# Patient Record
Sex: MALE | Race: WHITE | Employment: FULL TIME | URBAN - METROPOLITAN AREA
[De-identification: names, ages, dates, MRNs, and addresses within clinical notes are randomized per-mention and may not be internally consistent; named-entity substitution may affect disease eponyms.]

---

## 2017-05-09 ENCOUNTER — HOSPITAL ENCOUNTER (OUTPATIENT)
Age: 62
Setting detail: SPECIMEN
Discharge: HOME | End: 2017-05-09
Payer: COMMERCIAL

## 2017-05-09 ENCOUNTER — HOSPITAL ENCOUNTER (OUTPATIENT)
Age: 62
Discharge: HOME | End: 2017-05-09
Payer: COMMERCIAL

## 2017-05-09 ENCOUNTER — HOSPITAL ENCOUNTER (OUTPATIENT)
Dept: GENERAL RADIOLOGY | Age: 62
Discharge: HOME | End: 2017-05-09
Payer: COMMERCIAL

## 2017-05-09 DIAGNOSIS — K59.00 CONSTIPATION, UNSPECIFIED CONSTIPATION TYPE: ICD-10-CM

## 2017-05-09 DIAGNOSIS — R19.4 CHANGE IN BOWEL HABITS: ICD-10-CM

## 2017-05-09 DIAGNOSIS — I10 ESSENTIAL HYPERTENSION: ICD-10-CM

## 2017-05-09 DIAGNOSIS — R10.9 BILATERAL FLANK PAIN: ICD-10-CM

## 2017-05-09 DIAGNOSIS — Z12.5 SCREENING PSA (PROSTATE SPECIFIC ANTIGEN): ICD-10-CM

## 2017-05-09 LAB
ABSOLUTE EOS #: 0.1 K/UL (ref 0–0.4)
ABSOLUTE LYMPH #: 1.5 K/UL (ref 1–4.8)
ABSOLUTE MONO #: 0.6 K/UL (ref 0.1–1.2)
ALBUMIN SERPL-MCNC: 4.6 G/DL (ref 3.5–5.2)
ALBUMIN/GLOBULIN RATIO: 1.7 (ref 1–2.5)
ALP BLD-CCNC: 70 U/L (ref 40–129)
ALT SERPL-CCNC: 20 U/L (ref 5–41)
ANION GAP SERPL CALCULATED.3IONS-SCNC: 17 MMOL/L (ref 9–17)
AST SERPL-CCNC: 22 U/L
BASOPHILS # BLD: 1 %
BASOPHILS ABSOLUTE: 0 K/UL (ref 0–0.2)
BILIRUB SERPL-MCNC: 0.21 MG/DL (ref 0.3–1.2)
BUN BLDV-MCNC: 21 MG/DL (ref 6–20)
BUN/CREAT BLD: ABNORMAL (ref 9–20)
CALCIUM SERPL-MCNC: 9.5 MG/DL (ref 8.6–10.4)
CHLORIDE BLD-SCNC: 103 MMOL/L (ref 98–107)
CHOLESTEROL/HDL RATIO: 4.6
CHOLESTEROL: 180 MG/DL
CO2: 22 MMOL/L (ref 20–31)
CREAT SERPL-MCNC: 0.9 MG/DL (ref 0.7–1.2)
DIFFERENTIAL TYPE: ABNORMAL
EOSINOPHILS RELATIVE PERCENT: 3 %
GFR AFRICAN AMERICAN: >60 ML/MIN
GFR NON-AFRICAN AMERICAN: >60 ML/MIN
GFR SERPL CREATININE-BSD FRML MDRD: ABNORMAL ML/MIN/{1.73_M2}
GFR SERPL CREATININE-BSD FRML MDRD: ABNORMAL ML/MIN/{1.73_M2}
GLUCOSE BLD-MCNC: 104 MG/DL (ref 70–99)
HCT VFR BLD CALC: 42.7 % (ref 41–53)
HDLC SERPL-MCNC: 39 MG/DL
HEMOGLOBIN: 14.2 G/DL (ref 13.5–17.5)
LDL CHOLESTEROL: 67 MG/DL (ref 0–130)
LYMPHOCYTES # BLD: 28 %
MCH RBC QN AUTO: 31.8 PG (ref 26–34)
MCHC RBC AUTO-ENTMCNC: 33.3 G/DL (ref 31–37)
MCV RBC AUTO: 95.5 FL (ref 80–100)
MONOCYTES # BLD: 12 %
PDW BLD-RTO: 14.7 % (ref 12.5–15.4)
PLATELET # BLD: 195 K/UL (ref 140–450)
PLATELET ESTIMATE: ABNORMAL
PMV BLD AUTO: 7.8 FL (ref 6–12)
POTASSIUM SERPL-SCNC: 4.8 MMOL/L (ref 3.7–5.3)
RBC # BLD: 4.47 M/UL (ref 4.5–5.9)
RBC # BLD: ABNORMAL 10*6/UL
SEG NEUTROPHILS: 56 %
SEGMENTED NEUTROPHILS ABSOLUTE COUNT: 2.9 K/UL (ref 1.8–7.7)
SODIUM BLD-SCNC: 142 MMOL/L (ref 135–144)
TOTAL PROTEIN: 7.3 G/DL (ref 6.4–8.3)
TRIGL SERPL-MCNC: 371 MG/DL
VLDLC SERPL CALC-MCNC: ABNORMAL MG/DL (ref 1–30)
WBC # BLD: 5.2 K/UL (ref 3.5–11)
WBC # BLD: ABNORMAL 10*3/UL

## 2017-05-09 PROCEDURE — 74020 XR ABDOMEN STANDARD: CPT

## 2017-05-10 LAB — PROSTATE SPECIFIC ANTIGEN: 0.27 UG/L

## 2017-07-24 PROBLEM — R42 DIZZY: Status: ACTIVE | Noted: 2017-07-24

## 2017-07-27 ENCOUNTER — HOSPITAL ENCOUNTER (OUTPATIENT)
Dept: CT IMAGING | Age: 62
Discharge: HOME | End: 2017-07-27
Payer: COMMERCIAL

## 2017-07-27 DIAGNOSIS — R42 DIZZY: ICD-10-CM

## 2017-07-27 PROCEDURE — 70450 CT HEAD/BRAIN W/O DYE: CPT

## 2017-08-31 ENCOUNTER — HOSPITAL ENCOUNTER (OUTPATIENT)
Age: 62
Discharge: HOME | End: 2017-08-31
Payer: COMMERCIAL

## 2017-08-31 LAB
BUN BLDV-MCNC: 16 MG/DL (ref 8–23)
CHOLESTEROL/HDL RATIO: 5
CHOLESTEROL: 145 MG/DL
CREAT SERPL-MCNC: 0.92 MG/DL (ref 0.7–1.2)
GFR AFRICAN AMERICAN: >60 ML/MIN
GFR NON-AFRICAN AMERICAN: >60 ML/MIN
GFR SERPL CREATININE-BSD FRML MDRD: NORMAL ML/MIN/{1.73_M2}
GFR SERPL CREATININE-BSD FRML MDRD: NORMAL ML/MIN/{1.73_M2}
HDLC SERPL-MCNC: 29 MG/DL
LDL CHOLESTEROL DIRECT: 62 MG/DL
LDL CHOLESTEROL: ABNORMAL MG/DL (ref 0–130)
SEDIMENTATION RATE, ERYTHROCYTE: 20 MM (ref 0–10)
TRIGL SERPL-MCNC: 410 MG/DL
VLDLC SERPL CALC-MCNC: ABNORMAL MG/DL (ref 1–30)

## 2017-08-31 PROCEDURE — 80061 LIPID PANEL: CPT

## 2017-08-31 PROCEDURE — 82565 ASSAY OF CREATININE: CPT

## 2017-08-31 PROCEDURE — 84520 ASSAY OF UREA NITROGEN: CPT

## 2017-08-31 PROCEDURE — 83721 ASSAY OF BLOOD LIPOPROTEIN: CPT

## 2017-08-31 PROCEDURE — 36415 COLL VENOUS BLD VENIPUNCTURE: CPT

## 2017-08-31 PROCEDURE — 85651 RBC SED RATE NONAUTOMATED: CPT

## 2017-09-05 ENCOUNTER — HOSPITAL ENCOUNTER (OUTPATIENT)
Dept: NON INVASIVE DIAGNOSTICS | Age: 62
Discharge: HOME | End: 2017-09-05
Payer: COMMERCIAL

## 2017-09-05 ENCOUNTER — HOSPITAL ENCOUNTER (OUTPATIENT)
Dept: MRI IMAGING | Age: 62
Discharge: HOME | End: 2017-09-05
Payer: COMMERCIAL

## 2017-09-05 ENCOUNTER — HOSPITAL ENCOUNTER (OUTPATIENT)
Dept: VASCULAR LAB | Age: 62
Discharge: HOME | End: 2017-09-05
Payer: COMMERCIAL

## 2017-09-05 DIAGNOSIS — I63.311 CEREBRAL INFARCTION DUE TO THROMBOSIS OF RIGHT MIDDLE CEREBRAL ARTERY (HCC): ICD-10-CM

## 2017-09-05 LAB
LV EF: 55 %
LVEF MODALITY: NORMAL

## 2017-09-05 PROCEDURE — 70553 MRI BRAIN STEM W/O & W/DYE: CPT

## 2017-09-05 PROCEDURE — 2580000003 HC RX 258: Performed by: PSYCHIATRY & NEUROLOGY

## 2017-09-05 PROCEDURE — 6360000004 HC RX CONTRAST MEDICATION: Performed by: PSYCHIATRY & NEUROLOGY

## 2017-09-05 PROCEDURE — A9579 GAD-BASE MR CONTRAST NOS,1ML: HCPCS | Performed by: PSYCHIATRY & NEUROLOGY

## 2017-09-05 PROCEDURE — 93306 TTE W/DOPPLER COMPLETE: CPT

## 2017-09-05 PROCEDURE — 93880 EXTRACRANIAL BILAT STUDY: CPT

## 2017-09-05 RX ORDER — SODIUM CHLORIDE 0.9 % (FLUSH) 0.9 %
10 SYRINGE (ML) INJECTION PRN
Status: DISCONTINUED | OUTPATIENT
Start: 2017-09-05 | End: 2017-09-08 | Stop reason: HOSPADM

## 2017-09-05 RX ADMIN — Medication 10 ML: at 11:06

## 2017-09-05 RX ADMIN — GADOPENTETATE DIMEGLUMINE 18 ML: 469.01 INJECTION INTRAVENOUS at 11:05

## 2018-01-11 ENCOUNTER — HOSPITAL ENCOUNTER (OUTPATIENT)
Age: 63
Setting detail: SPECIMEN
Discharge: HOME | End: 2018-01-11
Payer: COMMERCIAL

## 2018-01-12 LAB
THYROXINE, FREE: 1.07 NG/DL (ref 0.93–1.7)
TSH SERPL DL<=0.05 MIU/L-ACNC: 2.01 MIU/L (ref 0.3–5)

## 2018-01-16 LAB
ACETYLCHOL BLOCK AB: 0 % (ref 0–26)
ACETYLCHOLINE BINDING ANTIBODY: 0 NMOL/L (ref 0–0.4)
STRIATED MUSCLE AB, IGG SCREEN: NORMAL
TITIN ANTIBODY: 0.24 IV (ref 0–0.45)

## 2018-05-16 ENCOUNTER — HOSPITAL ENCOUNTER (OUTPATIENT)
Age: 63
Discharge: HOME | End: 2018-05-18
Payer: COMMERCIAL

## 2018-05-16 ENCOUNTER — HOSPITAL ENCOUNTER (OUTPATIENT)
Dept: GENERAL RADIOLOGY | Age: 63
Discharge: HOME | End: 2018-05-18
Payer: COMMERCIAL

## 2018-05-16 DIAGNOSIS — M10.9 GOUT OF BIG TOE: ICD-10-CM

## 2018-05-16 DIAGNOSIS — R05.8 COUGH WITH SPUTUM: ICD-10-CM

## 2018-05-16 PROCEDURE — 73630 X-RAY EXAM OF FOOT: CPT

## 2018-05-16 PROCEDURE — 71046 X-RAY EXAM CHEST 2 VIEWS: CPT

## 2018-08-29 ENCOUNTER — OFFICE VISIT (OUTPATIENT)
Dept: ORTHOPEDIC SURGERY | Age: 63
End: 2018-08-29
Payer: COMMERCIAL

## 2018-08-29 DIAGNOSIS — M25.512 CHRONIC LEFT SHOULDER PAIN: Primary | ICD-10-CM

## 2018-08-29 DIAGNOSIS — G89.29 CHRONIC LEFT SHOULDER PAIN: Primary | ICD-10-CM

## 2018-08-29 PROCEDURE — 99203 OFFICE O/P NEW LOW 30 MIN: CPT | Performed by: ORTHOPAEDIC SURGERY

## 2018-08-29 NOTE — PROGRESS NOTES
Larna Mcardle M.D.            [address] [place] [address]., [address] [facility], [address] [facility]             Dept Phone: [phone]             Dept Fax:  [phone]    Jackie returns today. He's here for his left shoulder. He has a history of bilateral shoulder arthroscopies and mini open repairs. These were done in 2014 in 2015. He's had no problems with the left arm at all. He did have a recent episode he was doing some heavy lifting he felt a snap in his left shoulder is quite painful but is gotten better    Examination left shoulder notes obvious Evelio lesion. He has full abduction and external rotation strength however. No other contributory findings. He has no pain in the elbow no supination weakness    X-rays taken today reviewed by me show AP and outlet views left shoulder. He does have slight elevation humeral head but no other acute findings    Impression  Status post rotator cuff repair left shoulder  New-onset proximal biceps tendon rupture left shoulder  Previous rotator cuff repair right shoulder    Plan  Patient was advised that he has a biceps tendon rupture with a Evelio lesion. Nothing to do for this that she does self resolving over time and he's happy to hear this. Encouraged patient continue exercises. Back here when necessary        Review of Systems   Constitutional: Negative. HENT: Negative. Respiratory: Negative. Cardiovascular: Negative. Musculoskeletal: Negative. Neurological: Negative. Past Medical History:   Diagnosis Date    Arthritis     Chronic right shoulder pain     Chronic shoulder pain, left 2001    had surgery to repair    History of general anesthesia complication 6/66/8662    airway closed off after intubation after cervical neck surgery, was remedied with a counteracting medication    HLD (hyperlipidemia)     Hypertension     DARYL on CPAP 4/22/2015    Dr. Lyric Talbert.  Auto, 12-16cm of

## 2018-09-04 ENCOUNTER — HOSPITAL ENCOUNTER (OUTPATIENT)
Age: 63
Setting detail: SPECIMEN
Discharge: HOME | End: 2018-09-04
Payer: COMMERCIAL

## 2018-09-04 DIAGNOSIS — I10 ESSENTIAL HYPERTENSION: ICD-10-CM

## 2018-09-04 DIAGNOSIS — Z12.5 SCREENING PSA (PROSTATE SPECIFIC ANTIGEN): ICD-10-CM

## 2018-09-04 DIAGNOSIS — E78.2 MIXED HYPERLIPIDEMIA: ICD-10-CM

## 2018-09-04 DIAGNOSIS — Z72.89 OTHER PROBLEMS RELATED TO LIFESTYLE: ICD-10-CM

## 2018-09-04 DIAGNOSIS — Z87.39 H/O: GOUT: ICD-10-CM

## 2018-09-04 DIAGNOSIS — Z13.1 ENCOUNTER FOR SCREENING FOR DIABETES MELLITUS: ICD-10-CM

## 2018-09-04 PROBLEM — R42 DIZZY: Status: RESOLVED | Noted: 2017-07-24 | Resolved: 2018-09-04

## 2018-09-04 LAB
ABSOLUTE EOS #: 0.16 K/UL (ref 0–0.44)
ABSOLUTE IMMATURE GRANULOCYTE: 0.05 K/UL (ref 0–0.3)
ABSOLUTE LYMPH #: 1.68 K/UL (ref 1.1–3.7)
ABSOLUTE MONO #: 0.67 K/UL (ref 0.1–1.2)
ALBUMIN SERPL-MCNC: 4.6 G/DL (ref 3.5–5.2)
ALBUMIN/GLOBULIN RATIO: 1.8 (ref 1–2.5)
ALP BLD-CCNC: 68 U/L (ref 40–129)
ALT SERPL-CCNC: 26 U/L (ref 5–41)
ANION GAP SERPL CALCULATED.3IONS-SCNC: 14 MMOL/L (ref 9–17)
AST SERPL-CCNC: 24 U/L
BASOPHILS # BLD: 1 % (ref 0–2)
BASOPHILS ABSOLUTE: 0.04 K/UL (ref 0–0.2)
BILIRUB SERPL-MCNC: 0.58 MG/DL (ref 0.3–1.2)
BUN BLDV-MCNC: 18 MG/DL (ref 8–23)
BUN/CREAT BLD: ABNORMAL (ref 9–20)
CALCIUM SERPL-MCNC: 9.5 MG/DL (ref 8.6–10.4)
CHLORIDE BLD-SCNC: 103 MMOL/L (ref 98–107)
CHOLESTEROL/HDL RATIO: 3.3
CHOLESTEROL: 111 MG/DL
CO2: 27 MMOL/L (ref 20–31)
CREAT SERPL-MCNC: 0.87 MG/DL (ref 0.7–1.2)
DIFFERENTIAL TYPE: ABNORMAL
EOSINOPHILS RELATIVE PERCENT: 3 % (ref 1–4)
GFR AFRICAN AMERICAN: >60 ML/MIN
GFR NON-AFRICAN AMERICAN: >60 ML/MIN
GFR SERPL CREATININE-BSD FRML MDRD: ABNORMAL ML/MIN/{1.73_M2}
GFR SERPL CREATININE-BSD FRML MDRD: ABNORMAL ML/MIN/{1.73_M2}
GLUCOSE BLD-MCNC: 103 MG/DL (ref 70–99)
HCT VFR BLD CALC: 42.9 % (ref 40.7–50.3)
HDLC SERPL-MCNC: 34 MG/DL
HEMOGLOBIN: 13.9 G/DL (ref 13–17)
HEPATITIS C ANTIBODY: NONREACTIVE
IMMATURE GRANULOCYTES: 1 %
LDL CHOLESTEROL: 27 MG/DL (ref 0–130)
LYMPHOCYTES # BLD: 28 % (ref 24–43)
MCH RBC QN AUTO: 31.4 PG (ref 25.2–33.5)
MCHC RBC AUTO-ENTMCNC: 32.4 G/DL (ref 28.4–34.8)
MCV RBC AUTO: 96.8 FL (ref 82.6–102.9)
MONOCYTES # BLD: 11 % (ref 3–12)
NRBC AUTOMATED: 0 PER 100 WBC
PDW BLD-RTO: 12.6 % (ref 11.8–14.4)
PLATELET # BLD: 188 K/UL (ref 138–453)
PLATELET ESTIMATE: ABNORMAL
PMV BLD AUTO: 9.6 FL (ref 8.1–13.5)
POTASSIUM SERPL-SCNC: 4.3 MMOL/L (ref 3.7–5.3)
PROSTATE SPECIFIC ANTIGEN: 0.3 UG/L
RBC # BLD: 4.43 M/UL (ref 4.21–5.77)
RBC # BLD: ABNORMAL 10*6/UL
SEG NEUTROPHILS: 56 % (ref 36–65)
SEGMENTED NEUTROPHILS ABSOLUTE COUNT: 3.52 K/UL (ref 1.5–8.1)
SODIUM BLD-SCNC: 144 MMOL/L (ref 135–144)
TOTAL PROTEIN: 7.1 G/DL (ref 6.4–8.3)
TRIGL SERPL-MCNC: 248 MG/DL
URIC ACID: 6.9 MG/DL (ref 3.4–7)
VLDLC SERPL CALC-MCNC: ABNORMAL MG/DL (ref 1–30)
WBC # BLD: 6.1 K/UL (ref 3.5–11.3)
WBC # BLD: ABNORMAL 10*3/UL

## 2018-09-11 NOTE — PROGRESS NOTES
Recent lab testing was reviewed. NOrmal psa and hepatitis screening. High triglycerides of 248, improved from 410. OVerall cholesterol thou is good. Continue lipitor 40mg.  Normal uric acid level

## 2018-10-16 DIAGNOSIS — Z12.11 ENCOUNTER FOR SCREENING COLONOSCOPY: Primary | ICD-10-CM

## 2018-10-17 RX ORDER — POLYETHYLENE GLYCOL 3350 17 G/17G
POWDER, FOR SOLUTION ORAL
Qty: 255 G | Refills: 0 | Status: SHIPPED | OUTPATIENT
Start: 2018-10-17 | End: 2018-11-15

## 2018-10-21 ENCOUNTER — HOSPITAL ENCOUNTER (EMERGENCY)
Age: 63
Discharge: HOME | End: 2018-10-21
Attending: EMERGENCY MEDICINE
Payer: COMMERCIAL

## 2018-10-21 VITALS
WEIGHT: 195 LBS | HEART RATE: 88 BPM | BODY MASS INDEX: 29.55 KG/M2 | HEIGHT: 68 IN | TEMPERATURE: 98.8 F | SYSTOLIC BLOOD PRESSURE: 148 MMHG | RESPIRATION RATE: 18 BRPM | OXYGEN SATURATION: 99 % | DIASTOLIC BLOOD PRESSURE: 78 MMHG

## 2018-10-21 DIAGNOSIS — M62.838 TRAPEZIUS MUSCLE SPASM: Primary | ICD-10-CM

## 2018-10-21 PROCEDURE — 96372 THER/PROPH/DIAG INJ SC/IM: CPT

## 2018-10-21 PROCEDURE — 6360000002 HC RX W HCPCS: Performed by: PHYSICIAN ASSISTANT

## 2018-10-21 PROCEDURE — 99283 EMERGENCY DEPT VISIT LOW MDM: CPT

## 2018-10-21 RX ORDER — TRAMADOL HYDROCHLORIDE 50 MG/1
50 TABLET ORAL EVERY 8 HOURS PRN
Qty: 9 TABLET | Refills: 0 | Status: SHIPPED | OUTPATIENT
Start: 2018-10-21 | End: 2018-10-24

## 2018-10-21 RX ORDER — CYCLOBENZAPRINE HCL 10 MG
10 TABLET ORAL 2 TIMES DAILY PRN
Qty: 10 TABLET | Refills: 0 | Status: SHIPPED | OUTPATIENT
Start: 2018-10-21 | End: 2018-10-26

## 2018-10-21 RX ORDER — DEXAMETHASONE SODIUM PHOSPHATE 4 MG/ML
10 INJECTION, SOLUTION INTRA-ARTICULAR; INTRALESIONAL; INTRAMUSCULAR; INTRAVENOUS; SOFT TISSUE ONCE
Status: COMPLETED | OUTPATIENT
Start: 2018-10-21 | End: 2018-10-21

## 2018-10-21 RX ORDER — PREDNISONE 20 MG/1
20 TABLET ORAL DAILY
Qty: 5 TABLET | Refills: 0 | Status: SHIPPED | OUTPATIENT
Start: 2018-10-21 | End: 2018-10-26

## 2018-10-21 RX ORDER — KETOROLAC TROMETHAMINE 30 MG/ML
60 INJECTION, SOLUTION INTRAMUSCULAR; INTRAVENOUS ONCE
Status: COMPLETED | OUTPATIENT
Start: 2018-10-21 | End: 2018-10-21

## 2018-10-21 RX ADMIN — KETOROLAC TROMETHAMINE 60 MG: 30 INJECTION, SOLUTION INTRAMUSCULAR at 11:05

## 2018-10-21 RX ADMIN — DEXAMETHASONE SODIUM PHOSPHATE 10 MG: 4 INJECTION, SOLUTION INTRAMUSCULAR; INTRAVENOUS at 11:05

## 2018-10-21 ASSESSMENT — PAIN DESCRIPTION - ONSET: ONSET: PROGRESSIVE

## 2018-10-21 ASSESSMENT — PAIN DESCRIPTION - ORIENTATION: ORIENTATION: LEFT

## 2018-10-21 ASSESSMENT — PAIN DESCRIPTION - LOCATION: LOCATION: SHOULDER

## 2018-10-21 ASSESSMENT — PAIN DESCRIPTION - PAIN TYPE: TYPE: ACUTE PAIN

## 2018-10-21 ASSESSMENT — PAIN SCALES - GENERAL: PAINLEVEL_OUTOF10: 6

## 2018-10-21 ASSESSMENT — PAIN DESCRIPTION - DESCRIPTORS: DESCRIPTORS: SHOOTING;STABBING;SHARP

## 2018-10-21 NOTE — ED PROVIDER NOTES
[address]  eMERGENCY dEPARTMENT eNCOUnter      Pt Name: Yin Chung  MRN: [id number]  [place] 1955  Date of evaluation: 10/21/18      CHIEF COMPLAINT       Chief Complaint   Patient presents with    Shoulder Pain     Left    Arm Pain     Left         HISTORY OF PRESENT ILLNESS    Yin Chung is a 66 y.o. male who presents complaining of neck pain, left shoulder pain   The history is provided by the patient. Shoulder Problem   Location:  Shoulder  Shoulder location:  L shoulder  Injury: no    Pain details:     Quality:  Aching    Radiates to: neck pain to left shoulder radiates to occipital area, worse when turning head to left. Severity:  Moderate    Onset quality:  Gradual    Duration:  2 days    Timing:  Constant    Progression:  Unchanged  Dislocation: no    Foreign body present:  No foreign bodies  Tetanus status:  Unknown  Prior injury to area: neck issues, previous surgeries,   Relieved by: position. Worsened by: Movement (worse when turning head to the left)  Ineffective treatments:  Immobilization  Associated symptoms: decreased range of motion, neck pain and stiffness    Associated symptoms: no fever and no numbness    Risk factors: known bone disorder        REVIEW OF SYSTEMS       Review of Systems   Constitutional: Negative for fever. Musculoskeletal: Positive for neck pain and stiffness. All other systems reviewed and are negative. PAST MEDICAL HISTORY     Past Medical History:   Diagnosis Date    Arthritis     Chronic right shoulder pain     Chronic shoulder pain, left 2001    had surgery to repair    History of general anesthesia complication 9/96/0597    airway closed off after intubation after cervical neck surgery, was remedied with a counteracting medication    HLD (hyperlipidemia)     Hypertension     DARYL on CPAP 4/22/2015    Dr. Tiff Stark.  Auto, 12-16cm of H20    Respiratory failure, post-operative ([address] 5/16/2001    due to airway swelling obstruction

## 2018-10-23 ENCOUNTER — TELEPHONE (OUTPATIENT)
Dept: GASTROENTEROLOGY | Age: 63
End: 2018-10-23

## 2018-10-24 ENCOUNTER — HOSPITAL ENCOUNTER (OUTPATIENT)
Age: 63
Setting detail: OUTPATIENT SURGERY
Discharge: HOME | End: 2018-10-24
Attending: INTERNAL MEDICINE | Admitting: INTERNAL MEDICINE
Payer: COMMERCIAL

## 2018-10-24 VITALS
WEIGHT: 195 LBS | SYSTOLIC BLOOD PRESSURE: 128 MMHG | TEMPERATURE: 97.5 F | BODY MASS INDEX: 29.55 KG/M2 | HEART RATE: 68 BPM | HEIGHT: 68 IN | RESPIRATION RATE: 16 BRPM | OXYGEN SATURATION: 97 % | DIASTOLIC BLOOD PRESSURE: 79 MMHG

## 2018-10-24 PROCEDURE — 88305 TISSUE EXAM BY PATHOLOGIST: CPT

## 2018-10-24 PROCEDURE — 6360000002 HC RX W HCPCS: Performed by: INTERNAL MEDICINE

## 2018-10-24 PROCEDURE — 99152 MOD SED SAME PHYS/QHP 5/>YRS: CPT | Performed by: INTERNAL MEDICINE

## 2018-10-24 PROCEDURE — 2580000003 HC RX 258: Performed by: INTERNAL MEDICINE

## 2018-10-24 PROCEDURE — 7100000011 HC PHASE II RECOVERY - ADDTL 15 MIN: Performed by: INTERNAL MEDICINE

## 2018-10-24 PROCEDURE — 99153 MOD SED SAME PHYS/QHP EA: CPT | Performed by: INTERNAL MEDICINE

## 2018-10-24 PROCEDURE — 2709999900 HC NON-CHARGEABLE SUPPLY: Performed by: INTERNAL MEDICINE

## 2018-10-24 PROCEDURE — 3609010300 HC COLONOSCOPY W/BIOPSY SINGLE/MULTIPLE: Performed by: INTERNAL MEDICINE

## 2018-10-24 PROCEDURE — 7100000010 HC PHASE II RECOVERY - FIRST 15 MIN: Performed by: INTERNAL MEDICINE

## 2018-10-24 RX ORDER — MIDAZOLAM HYDROCHLORIDE 1 MG/ML
INJECTION INTRAMUSCULAR; INTRAVENOUS PRN
Status: DISCONTINUED | OUTPATIENT
Start: 2018-10-24 | End: 2018-10-24 | Stop reason: HOSPADM

## 2018-10-24 RX ORDER — FENTANYL CITRATE 50 UG/ML
INJECTION, SOLUTION INTRAMUSCULAR; INTRAVENOUS PRN
Status: DISCONTINUED | OUTPATIENT
Start: 2018-10-24 | End: 2018-10-24 | Stop reason: HOSPADM

## 2018-10-24 RX ORDER — SODIUM CHLORIDE 9 MG/ML
INJECTION, SOLUTION INTRAVENOUS CONTINUOUS
Status: DISCONTINUED | OUTPATIENT
Start: 2018-10-24 | End: 2018-10-24 | Stop reason: HOSPADM

## 2018-10-24 RX ADMIN — SODIUM CHLORIDE: 9 INJECTION, SOLUTION INTRAVENOUS at 08:34

## 2018-10-24 ASSESSMENT — PAIN DESCRIPTION - PAIN TYPE: TYPE: SURGICAL PAIN

## 2018-10-24 ASSESSMENT — PAIN - FUNCTIONAL ASSESSMENT: PAIN_FUNCTIONAL_ASSESSMENT: 0-10

## 2018-10-24 ASSESSMENT — PAIN SCALES - GENERAL
PAINLEVEL_OUTOF10: 0
PAINLEVEL_OUTOF10: 3

## 2018-10-24 ASSESSMENT — PAIN DESCRIPTION - LOCATION: LOCATION: ABDOMEN

## 2018-10-24 ASSESSMENT — PAIN DESCRIPTION - DESCRIPTORS: DESCRIPTORS: CRAMPING

## 2018-10-24 ASSESSMENT — PAIN DESCRIPTION - FREQUENCY: FREQUENCY: INTERMITTENT

## 2018-10-24 NOTE — H&P
or ulcerations. No warmth, tenderness, erythema noted. NEUROLOGIC:  The patient is conscious, alert, oriented. No focal sensory or motor deficits. Cranial nerve exam reveals no deficits. 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     Electronically signed by Sara Powers MD on 10/24/2018 at 8:50 AM      Harish Mclaughlin PA-C on 10/24/2018 at 8:44 AM

## 2018-10-25 LAB — SURGICAL PATHOLOGY REPORT: NORMAL

## 2018-11-14 ENCOUNTER — TELEPHONE (OUTPATIENT)
Dept: GASTROENTEROLOGY | Age: 63
End: 2018-11-14

## 2019-02-24 ENCOUNTER — APPOINTMENT (OUTPATIENT)
Dept: CT IMAGING | Age: 64
End: 2019-02-24
Payer: COMMERCIAL

## 2019-02-24 ENCOUNTER — HOSPITAL ENCOUNTER (OUTPATIENT)
Age: 64
Setting detail: OBSERVATION
Discharge: HOME | End: 2019-02-25
Attending: EMERGENCY MEDICINE | Admitting: EMERGENCY MEDICINE
Payer: COMMERCIAL

## 2019-02-24 ENCOUNTER — APPOINTMENT (OUTPATIENT)
Dept: GENERAL RADIOLOGY | Age: 64
End: 2019-02-24
Payer: COMMERCIAL

## 2019-02-24 DIAGNOSIS — R10.13 DYSPEPSIA: Primary | ICD-10-CM

## 2019-02-24 PROBLEM — R07.9 CHEST PAIN: Status: ACTIVE | Noted: 2019-02-24

## 2019-02-24 LAB
ABSOLUTE EOS #: 0.15 K/UL (ref 0–0.44)
ABSOLUTE IMMATURE GRANULOCYTE: 0.03 K/UL (ref 0–0.3)
ABSOLUTE LYMPH #: 1.86 K/UL (ref 1.1–3.7)
ABSOLUTE MONO #: 0.89 K/UL (ref 0.1–1.2)
ALBUMIN SERPL-MCNC: 4.4 G/DL (ref 3.5–5.2)
ALBUMIN/GLOBULIN RATIO: 1.5 (ref 1–2.5)
ALP BLD-CCNC: 75 U/L (ref 40–129)
ALT SERPL-CCNC: 22 U/L (ref 5–41)
ANION GAP SERPL CALCULATED.3IONS-SCNC: 18 MMOL/L (ref 9–17)
AST SERPL-CCNC: 28 U/L
BASOPHILS # BLD: 1 % (ref 0–2)
BASOPHILS ABSOLUTE: 0.04 K/UL (ref 0–0.2)
BILIRUB SERPL-MCNC: 0.33 MG/DL (ref 0.3–1.2)
BILIRUBIN DIRECT: 0.1 MG/DL
BILIRUBIN, INDIRECT: 0.23 MG/DL (ref 0–1)
BUN BLDV-MCNC: 21 MG/DL (ref 8–23)
BUN/CREAT BLD: ABNORMAL (ref 9–20)
CALCIUM SERPL-MCNC: 10.1 MG/DL (ref 8.6–10.4)
CHLORIDE BLD-SCNC: 96 MMOL/L (ref 98–107)
CO2: 24 MMOL/L (ref 20–31)
CREAT SERPL-MCNC: 1.05 MG/DL (ref 0.7–1.2)
DIFFERENTIAL TYPE: ABNORMAL
EOSINOPHILS RELATIVE PERCENT: 2 % (ref 1–4)
GFR AFRICAN AMERICAN: >60 ML/MIN
GFR NON-AFRICAN AMERICAN: >60 ML/MIN
GFR SERPL CREATININE-BSD FRML MDRD: ABNORMAL ML/MIN/{1.73_M2}
GFR SERPL CREATININE-BSD FRML MDRD: ABNORMAL ML/MIN/{1.73_M2}
GLOBULIN: NORMAL G/DL (ref 1.5–3.8)
GLUCOSE BLD-MCNC: 110 MG/DL (ref 70–99)
HCT VFR BLD CALC: 42.7 % (ref 40.7–50.3)
HEMOGLOBIN: 14.5 G/DL (ref 13–17)
IMMATURE GRANULOCYTES: 0 %
LIPASE: 26 U/L (ref 13–60)
LYMPHOCYTES # BLD: 23 % (ref 24–43)
MCH RBC QN AUTO: 32.6 PG (ref 25.2–33.5)
MCHC RBC AUTO-ENTMCNC: 34 G/DL (ref 28.4–34.8)
MCV RBC AUTO: 96 FL (ref 82.6–102.9)
MONOCYTES # BLD: 11 % (ref 3–12)
NRBC AUTOMATED: 0 PER 100 WBC
PDW BLD-RTO: 13 % (ref 11.8–14.4)
PLATELET # BLD: 210 K/UL (ref 138–453)
PLATELET ESTIMATE: ABNORMAL
PMV BLD AUTO: 8.9 FL (ref 8.1–13.5)
POTASSIUM SERPL-SCNC: 3.8 MMOL/L (ref 3.7–5.3)
RBC # BLD: 4.45 M/UL (ref 4.21–5.77)
RBC # BLD: ABNORMAL 10*6/UL
SEG NEUTROPHILS: 63 % (ref 36–65)
SEGMENTED NEUTROPHILS ABSOLUTE COUNT: 5.15 K/UL (ref 1.5–8.1)
SODIUM BLD-SCNC: 138 MMOL/L (ref 135–144)
TOTAL PROTEIN: 7.4 G/DL (ref 6.4–8.3)
TROPONIN INTERP: NORMAL
TROPONIN INTERP: NORMAL
TROPONIN T: NORMAL NG/ML
TROPONIN T: NORMAL NG/ML
TROPONIN, HIGH SENSITIVITY: 10 NG/L (ref 0–22)
TROPONIN, HIGH SENSITIVITY: 9 NG/L (ref 0–22)
WBC # BLD: 8.1 K/UL (ref 3.5–11.3)
WBC # BLD: ABNORMAL 10*3/UL

## 2019-02-24 PROCEDURE — 71046 X-RAY EXAM CHEST 2 VIEWS: CPT

## 2019-02-24 PROCEDURE — 85025 COMPLETE CBC W/AUTO DIFF WBC: CPT

## 2019-02-24 PROCEDURE — 6360000004 HC RX CONTRAST MEDICATION: Performed by: EMERGENCY MEDICINE

## 2019-02-24 PROCEDURE — 84484 ASSAY OF TROPONIN QUANT: CPT

## 2019-02-24 PROCEDURE — 99285 EMERGENCY DEPT VISIT HI MDM: CPT

## 2019-02-24 PROCEDURE — 6370000000 HC RX 637 (ALT 250 FOR IP): Performed by: EMERGENCY MEDICINE

## 2019-02-24 PROCEDURE — 71275 CT ANGIOGRAPHY CHEST: CPT

## 2019-02-24 PROCEDURE — 6360000002 HC RX W HCPCS: Performed by: EMERGENCY MEDICINE

## 2019-02-24 PROCEDURE — G0378 HOSPITAL OBSERVATION PER HR: HCPCS

## 2019-02-24 PROCEDURE — 80076 HEPATIC FUNCTION PANEL: CPT

## 2019-02-24 PROCEDURE — 6370000000 HC RX 637 (ALT 250 FOR IP): Performed by: STUDENT IN AN ORGANIZED HEALTH CARE EDUCATION/TRAINING PROGRAM

## 2019-02-24 PROCEDURE — 83690 ASSAY OF LIPASE: CPT

## 2019-02-24 PROCEDURE — 2580000003 HC RX 258: Performed by: EMERGENCY MEDICINE

## 2019-02-24 PROCEDURE — 93005 ELECTROCARDIOGRAM TRACING: CPT

## 2019-02-24 PROCEDURE — 96374 THER/PROPH/DIAG INJ IV PUSH: CPT

## 2019-02-24 PROCEDURE — 80048 BASIC METABOLIC PNL TOTAL CA: CPT

## 2019-02-24 RX ORDER — SUCRALFATE 1 G/1
1 TABLET ORAL EVERY 6 HOURS SCHEDULED
Status: DISCONTINUED | OUTPATIENT
Start: 2019-02-24 | End: 2019-02-25 | Stop reason: HOSPADM

## 2019-02-24 RX ORDER — FAMOTIDINE 20 MG/1
20 TABLET, FILM COATED ORAL ONCE
Status: COMPLETED | OUTPATIENT
Start: 2019-02-24 | End: 2019-02-24

## 2019-02-24 RX ORDER — FLUTICASONE PROPIONATE 50 MCG
1 SPRAY, SUSPENSION (ML) NASAL DAILY
Status: DISCONTINUED | OUTPATIENT
Start: 2019-02-24 | End: 2019-02-25 | Stop reason: HOSPADM

## 2019-02-24 RX ORDER — LORAZEPAM 2 MG/ML
3 INJECTION INTRAMUSCULAR
Status: DISCONTINUED | OUTPATIENT
Start: 2019-02-24 | End: 2019-02-25 | Stop reason: HOSPADM

## 2019-02-24 RX ORDER — LORAZEPAM 2 MG/ML
4 INJECTION INTRAMUSCULAR
Status: DISCONTINUED | OUTPATIENT
Start: 2019-02-24 | End: 2019-02-25 | Stop reason: HOSPADM

## 2019-02-24 RX ORDER — METOPROLOL SUCCINATE 50 MG/1
50 TABLET, EXTENDED RELEASE ORAL DAILY
Status: DISCONTINUED | OUTPATIENT
Start: 2019-02-24 | End: 2019-02-25 | Stop reason: HOSPADM

## 2019-02-24 RX ORDER — TIZANIDINE 4 MG/1
4 TABLET ORAL 2 TIMES DAILY
Status: DISCONTINUED | OUTPATIENT
Start: 2019-02-24 | End: 2019-02-25 | Stop reason: HOSPADM

## 2019-02-24 RX ORDER — SODIUM CHLORIDE 0.9 % (FLUSH) 0.9 %
10 SYRINGE (ML) INJECTION PRN
Status: DISCONTINUED | OUTPATIENT
Start: 2019-02-24 | End: 2019-02-25 | Stop reason: HOSPADM

## 2019-02-24 RX ORDER — SODIUM CHLORIDE 0.9 % (FLUSH) 0.9 %
10 SYRINGE (ML) INJECTION EVERY 12 HOURS SCHEDULED
Status: DISCONTINUED | OUTPATIENT
Start: 2019-02-24 | End: 2019-02-25 | Stop reason: HOSPADM

## 2019-02-24 RX ORDER — PANTOPRAZOLE SODIUM 40 MG/1
40 TABLET, DELAYED RELEASE ORAL
Status: DISCONTINUED | OUTPATIENT
Start: 2019-02-24 | End: 2019-02-25 | Stop reason: HOSPADM

## 2019-02-24 RX ORDER — LORAZEPAM 2 MG/ML
1 INJECTION INTRAMUSCULAR
Status: DISCONTINUED | OUTPATIENT
Start: 2019-02-24 | End: 2019-02-25 | Stop reason: HOSPADM

## 2019-02-24 RX ORDER — MAGNESIUM HYDROXIDE/ALUMINUM HYDROXICE/SIMETHICONE 120; 1200; 1200 MG/30ML; MG/30ML; MG/30ML
30 SUSPENSION ORAL
Status: COMPLETED | OUTPATIENT
Start: 2019-02-24 | End: 2019-02-24

## 2019-02-24 RX ORDER — ATORVASTATIN CALCIUM 40 MG/1
40 TABLET, FILM COATED ORAL NIGHTLY
Status: DISCONTINUED | OUTPATIENT
Start: 2019-02-24 | End: 2019-02-25 | Stop reason: HOSPADM

## 2019-02-24 RX ORDER — LORAZEPAM 1 MG/1
3 TABLET ORAL
Status: DISCONTINUED | OUTPATIENT
Start: 2019-02-24 | End: 2019-02-25 | Stop reason: HOSPADM

## 2019-02-24 RX ORDER — LORAZEPAM 2 MG/ML
2 INJECTION INTRAMUSCULAR
Status: DISCONTINUED | OUTPATIENT
Start: 2019-02-24 | End: 2019-02-25 | Stop reason: HOSPADM

## 2019-02-24 RX ORDER — LOSARTAN POTASSIUM 50 MG/1
100 TABLET ORAL DAILY
Status: DISCONTINUED | OUTPATIENT
Start: 2019-02-24 | End: 2019-02-25 | Stop reason: HOSPADM

## 2019-02-24 RX ORDER — ASPIRIN 325 MG
325 TABLET ORAL DAILY
Status: DISCONTINUED | OUTPATIENT
Start: 2019-02-24 | End: 2019-02-25

## 2019-02-24 RX ORDER — LORAZEPAM 1 MG/1
4 TABLET ORAL
Status: DISCONTINUED | OUTPATIENT
Start: 2019-02-24 | End: 2019-02-25 | Stop reason: HOSPADM

## 2019-02-24 RX ORDER — LORAZEPAM 1 MG/1
1 TABLET ORAL
Status: DISCONTINUED | OUTPATIENT
Start: 2019-02-24 | End: 2019-02-25 | Stop reason: HOSPADM

## 2019-02-24 RX ORDER — HYDROCHLOROTHIAZIDE 25 MG/1
12.5 TABLET ORAL DAILY
Status: DISCONTINUED | OUTPATIENT
Start: 2019-02-24 | End: 2019-02-25 | Stop reason: HOSPADM

## 2019-02-24 RX ORDER — LOSARTAN POTASSIUM AND HYDROCHLOROTHIAZIDE 12.5; 1 MG/1; MG/1
1 TABLET ORAL DAILY
Status: DISCONTINUED | OUTPATIENT
Start: 2019-02-24 | End: 2019-02-24 | Stop reason: RX

## 2019-02-24 RX ORDER — ONDANSETRON 2 MG/ML
8 INJECTION INTRAMUSCULAR; INTRAVENOUS ONCE
Status: COMPLETED | OUTPATIENT
Start: 2019-02-24 | End: 2019-02-24

## 2019-02-24 RX ORDER — LORAZEPAM 1 MG/1
2 TABLET ORAL
Status: DISCONTINUED | OUTPATIENT
Start: 2019-02-24 | End: 2019-02-25 | Stop reason: HOSPADM

## 2019-02-24 RX ORDER — ACETAMINOPHEN 325 MG/1
650 TABLET ORAL EVERY 4 HOURS PRN
Status: DISCONTINUED | OUTPATIENT
Start: 2019-02-24 | End: 2019-02-25 | Stop reason: HOSPADM

## 2019-02-24 RX ORDER — ASPIRIN 81 MG/1
324 TABLET, CHEWABLE ORAL ONCE
Status: COMPLETED | OUTPATIENT
Start: 2019-02-24 | End: 2019-02-24

## 2019-02-24 RX ADMIN — SODIUM CHLORIDE, PRESERVATIVE FREE 10 ML: 5 INJECTION INTRAVENOUS at 21:27

## 2019-02-24 RX ADMIN — METOPROLOL SUCCINATE 50 MG: 50 TABLET, FILM COATED, EXTENDED RELEASE ORAL at 13:23

## 2019-02-24 RX ADMIN — ASPIRIN 81 MG 324 MG: 81 TABLET ORAL at 04:49

## 2019-02-24 RX ADMIN — FAMOTIDINE 20 MG: 20 TABLET, FILM COATED ORAL at 04:49

## 2019-02-24 RX ADMIN — SUCRALFATE 1 G: 1 TABLET ORAL at 23:07

## 2019-02-24 RX ADMIN — ALUMINUM HYDROXIDE, MAGNESIUM HYDROXIDE, AND SIMETHICONE 30 ML: 200; 200; 20 SUSPENSION ORAL at 04:49

## 2019-02-24 RX ADMIN — ONDANSETRON 8 MG: 2 INJECTION INTRAMUSCULAR; INTRAVENOUS at 06:05

## 2019-02-24 RX ADMIN — SUCRALFATE 1 G: 1 TABLET ORAL at 18:22

## 2019-02-24 RX ADMIN — PANTOPRAZOLE SODIUM 40 MG: 40 TABLET, DELAYED RELEASE ORAL at 13:22

## 2019-02-24 RX ADMIN — SUCRALFATE 1 G: 1 TABLET ORAL at 06:05

## 2019-02-24 RX ADMIN — IOPAMIDOL 75 ML: 755 INJECTION, SOLUTION INTRAVENOUS at 06:34

## 2019-02-24 RX ADMIN — FLUTICASONE PROPIONATE 1 SPRAY: 50 SPRAY, METERED NASAL at 13:24

## 2019-02-24 RX ADMIN — LOSARTAN POTASSIUM 100 MG: 50 TABLET, FILM COATED ORAL at 13:23

## 2019-02-24 RX ADMIN — TIZANIDINE 4 MG: 4 TABLET ORAL at 21:28

## 2019-02-24 RX ADMIN — SUCRALFATE 1 G: 1 TABLET ORAL at 12:21

## 2019-02-24 RX ADMIN — DESMOPRESSIN ACETATE 40 MG: 0.2 TABLET ORAL at 21:27

## 2019-02-24 RX ADMIN — HYDROCHLOROTHIAZIDE 12.5 MG: 25 TABLET ORAL at 13:23

## 2019-02-24 ASSESSMENT — ENCOUNTER SYMPTOMS
SHORTNESS OF BREATH: 0
ABDOMINAL PAIN: 1
CHEST TIGHTNESS: 0
VOMITING: 0
NAUSEA: 1
DIARRHEA: 0
COUGH: 0
BACK PAIN: 0

## 2019-02-24 ASSESSMENT — PAIN DESCRIPTION - FREQUENCY: FREQUENCY: CONTINUOUS

## 2019-02-24 ASSESSMENT — PAIN SCALES - GENERAL
PAINLEVEL_OUTOF10: 0
PAINLEVEL_OUTOF10: 3
PAINLEVEL_OUTOF10: 0

## 2019-02-24 ASSESSMENT — HEART SCORE
ECG: 0
ECG: 1

## 2019-02-24 ASSESSMENT — PAIN DESCRIPTION - PAIN TYPE: TYPE: ACUTE PAIN

## 2019-02-24 ASSESSMENT — PAIN DESCRIPTION - ORIENTATION: ORIENTATION: MID

## 2019-02-24 ASSESSMENT — PAIN DESCRIPTION - DESCRIPTORS: DESCRIPTORS: BURNING

## 2019-02-24 ASSESSMENT — PAIN DESCRIPTION - LOCATION: LOCATION: STERNUM;THROAT

## 2019-02-25 ENCOUNTER — APPOINTMENT (OUTPATIENT)
Dept: NUCLEAR MEDICINE | Age: 64
End: 2019-02-25
Payer: COMMERCIAL

## 2019-02-25 ENCOUNTER — TELEPHONE (OUTPATIENT)
Dept: GASTROENTEROLOGY | Age: 64
End: 2019-02-25

## 2019-02-25 VITALS
RESPIRATION RATE: 14 BRPM | BODY MASS INDEX: 29.97 KG/M2 | WEIGHT: 197.75 LBS | OXYGEN SATURATION: 95 % | HEART RATE: 60 BPM | SYSTOLIC BLOOD PRESSURE: 132 MMHG | HEIGHT: 68 IN | DIASTOLIC BLOOD PRESSURE: 74 MMHG | TEMPERATURE: 97.9 F

## 2019-02-25 LAB
EKG ATRIAL RATE: 62 BPM
EKG ATRIAL RATE: 88 BPM
EKG P AXIS: -11 DEGREES
EKG P AXIS: -21 DEGREES
EKG P-R INTERVAL: 166 MS
EKG P-R INTERVAL: 166 MS
EKG Q-T INTERVAL: 374 MS
EKG Q-T INTERVAL: 436 MS
EKG QRS DURATION: 102 MS
EKG QRS DURATION: 110 MS
EKG QTC CALCULATION (BAZETT): 442 MS
EKG QTC CALCULATION (BAZETT): 452 MS
EKG R AXIS: 41 DEGREES
EKG R AXIS: 47 DEGREES
EKG T AXIS: -4 DEGREES
EKG T AXIS: 2 DEGREES
EKG VENTRICULAR RATE: 62 BPM
EKG VENTRICULAR RATE: 88 BPM
LV EF: 70 %
LVEF MODALITY: NORMAL
TROPONIN INTERP: NORMAL
TROPONIN T: NORMAL NG/ML
TROPONIN, HIGH SENSITIVITY: 10 NG/L (ref 0–22)

## 2019-02-25 PROCEDURE — 3430000000 HC RX DIAGNOSTIC RADIOPHARMACEUTICAL: Performed by: EMERGENCY MEDICINE

## 2019-02-25 PROCEDURE — 84484 ASSAY OF TROPONIN QUANT: CPT

## 2019-02-25 PROCEDURE — 78452 HT MUSCLE IMAGE SPECT MULT: CPT

## 2019-02-25 PROCEDURE — A9500 TC99M SESTAMIBI: HCPCS | Performed by: EMERGENCY MEDICINE

## 2019-02-25 PROCEDURE — 6360000002 HC RX W HCPCS: Performed by: EMERGENCY MEDICINE

## 2019-02-25 PROCEDURE — 6370000000 HC RX 637 (ALT 250 FOR IP): Performed by: EMERGENCY MEDICINE

## 2019-02-25 PROCEDURE — 93005 ELECTROCARDIOGRAM TRACING: CPT

## 2019-02-25 PROCEDURE — 2580000003 HC RX 258: Performed by: EMERGENCY MEDICINE

## 2019-02-25 PROCEDURE — 6370000000 HC RX 637 (ALT 250 FOR IP): Performed by: STUDENT IN AN ORGANIZED HEALTH CARE EDUCATION/TRAINING PROGRAM

## 2019-02-25 PROCEDURE — 93017 CV STRESS TEST TRACING ONLY: CPT | Performed by: NURSE PRACTITIONER

## 2019-02-25 PROCEDURE — G0378 HOSPITAL OBSERVATION PER HR: HCPCS

## 2019-02-25 PROCEDURE — 36415 COLL VENOUS BLD VENIPUNCTURE: CPT

## 2019-02-25 RX ORDER — SODIUM CHLORIDE 0.9 % (FLUSH) 0.9 %
10 SYRINGE (ML) INJECTION PRN
Status: DISCONTINUED | OUTPATIENT
Start: 2019-02-25 | End: 2019-02-25 | Stop reason: HOSPADM

## 2019-02-25 RX ORDER — SODIUM CHLORIDE 9 MG/ML
INJECTION, SOLUTION INTRAVENOUS ONCE
Status: DISCONTINUED | OUTPATIENT
Start: 2019-02-25 | End: 2019-02-25

## 2019-02-25 RX ORDER — METOPROLOL SUCCINATE 50 MG/1
50 TABLET, EXTENDED RELEASE ORAL DAILY
Qty: 90 TABLET | Refills: 0 | Status: SHIPPED | OUTPATIENT
Start: 2019-02-25 | End: 2019-07-12 | Stop reason: SDUPTHER

## 2019-02-25 RX ORDER — AMINOPHYLLINE DIHYDRATE 25 MG/ML
100 INJECTION, SOLUTION INTRAVENOUS
Status: DISCONTINUED | OUTPATIENT
Start: 2019-02-25 | End: 2019-02-25

## 2019-02-25 RX ORDER — SODIUM CHLORIDE 0.9 % (FLUSH) 0.9 %
10 SYRINGE (ML) INJECTION PRN
Status: DISCONTINUED | OUTPATIENT
Start: 2019-02-25 | End: 2019-02-25

## 2019-02-25 RX ORDER — METOPROLOL TARTRATE 5 MG/5ML
2.5 INJECTION INTRAVENOUS PRN
Status: DISCONTINUED | OUTPATIENT
Start: 2019-02-25 | End: 2019-02-25

## 2019-02-25 RX ORDER — LOSARTAN POTASSIUM AND HYDROCHLOROTHIAZIDE 12.5; 1 MG/1; MG/1
1 TABLET ORAL DAILY
Qty: 90 TABLET | Refills: 0 | Status: SHIPPED | OUTPATIENT
Start: 2019-02-25 | End: 2019-08-09

## 2019-02-25 RX ORDER — NITROGLYCERIN 0.4 MG/1
0.4 TABLET SUBLINGUAL EVERY 5 MIN PRN
Status: DISCONTINUED | OUTPATIENT
Start: 2019-02-25 | End: 2019-02-25

## 2019-02-25 RX ORDER — SUCRALFATE 1 G/1
1 TABLET ORAL 2 TIMES DAILY
Qty: 120 TABLET | Refills: 1 | Status: SHIPPED | OUTPATIENT
Start: 2019-02-25 | End: 2020-02-14

## 2019-02-25 RX ORDER — PANTOPRAZOLE SODIUM 40 MG/1
40 TABLET, DELAYED RELEASE ORAL
Qty: 30 TABLET | Refills: 1 | Status: SHIPPED | OUTPATIENT
Start: 2019-02-25 | End: 2020-02-14

## 2019-02-25 RX ORDER — ASPIRIN 81 MG/1
81 TABLET, CHEWABLE ORAL DAILY
Status: DISCONTINUED | OUTPATIENT
Start: 2019-02-25 | End: 2019-02-25 | Stop reason: HOSPADM

## 2019-02-25 RX ADMIN — TETRAKIS(2-METHOXYISOBUTYLISOCYANIDE)COPPER(I) TETRAFLUOROBORATE 13.5 MILLICURIE: 1 INJECTION, POWDER, LYOPHILIZED, FOR SOLUTION INTRAVENOUS at 07:35

## 2019-02-25 RX ADMIN — LOSARTAN POTASSIUM 100 MG: 50 TABLET, FILM COATED ORAL at 12:23

## 2019-02-25 RX ADMIN — SUCRALFATE 1 G: 1 TABLET ORAL at 12:24

## 2019-02-25 RX ADMIN — Medication 10 ML: at 10:27

## 2019-02-25 RX ADMIN — HYDROCHLOROTHIAZIDE 12.5 MG: 25 TABLET ORAL at 12:24

## 2019-02-25 RX ADMIN — SODIUM CHLORIDE, PRESERVATIVE FREE 10 ML: 5 INJECTION INTRAVENOUS at 07:35

## 2019-02-25 RX ADMIN — SODIUM CHLORIDE, PRESERVATIVE FREE 10 ML: 5 INJECTION INTRAVENOUS at 10:43

## 2019-02-25 RX ADMIN — ASPIRIN 81 MG: 81 TABLET, CHEWABLE ORAL at 12:25

## 2019-02-25 RX ADMIN — TIZANIDINE 4 MG: 4 TABLET ORAL at 12:24

## 2019-02-25 RX ADMIN — TETRAKIS(2-METHOXYISOBUTYLISOCYANIDE)COPPER(I) TETRAFLUOROBORATE 35 MILLICURIE: 1 INJECTION, POWDER, LYOPHILIZED, FOR SOLUTION INTRAVENOUS at 10:43

## 2019-02-25 RX ADMIN — METOPROLOL SUCCINATE 50 MG: 50 TABLET, FILM COATED, EXTENDED RELEASE ORAL at 12:24

## 2019-02-25 RX ADMIN — PANTOPRAZOLE SODIUM 40 MG: 40 TABLET, DELAYED RELEASE ORAL at 12:24

## 2019-02-25 RX ADMIN — REGADENOSON 0.4 MG: 0.08 INJECTION, SOLUTION INTRAVENOUS at 10:43

## 2019-03-11 ENCOUNTER — OFFICE VISIT (OUTPATIENT)
Dept: GASTROENTEROLOGY | Age: 64
End: 2019-03-11
Payer: COMMERCIAL

## 2019-03-11 VITALS
BODY MASS INDEX: 30.62 KG/M2 | SYSTOLIC BLOOD PRESSURE: 119 MMHG | WEIGHT: 202 LBS | HEIGHT: 68 IN | HEART RATE: 70 BPM | DIASTOLIC BLOOD PRESSURE: 75 MMHG

## 2019-03-11 DIAGNOSIS — Z86.010 HISTORY OF COLON POLYPS: Primary | ICD-10-CM

## 2019-03-11 DIAGNOSIS — K21.9 GASTROESOPHAGEAL REFLUX DISEASE, ESOPHAGITIS PRESENCE NOT SPECIFIED: ICD-10-CM

## 2019-03-11 PROCEDURE — 99214 OFFICE O/P EST MOD 30 MIN: CPT | Performed by: INTERNAL MEDICINE

## 2019-03-11 ASSESSMENT — ENCOUNTER SYMPTOMS
EYE REDNESS: 1
BLOOD IN STOOL: 0
SINUS PRESSURE: 0
VOMITING: 0
ANAL BLEEDING: 0
ABDOMINAL DISTENTION: 0
SHORTNESS OF BREATH: 0
WHEEZING: 0
NAUSEA: 0
CONSTIPATION: 0
TROUBLE SWALLOWING: 0
EYE PAIN: 0
RECTAL PAIN: 0
SINUS PAIN: 0
COUGH: 0
ABDOMINAL PAIN: 0
BACK PAIN: 0
CHEST TIGHTNESS: 0
DIARRHEA: 0

## 2019-07-17 RX ORDER — METOPROLOL SUCCINATE 50 MG/1
TABLET, EXTENDED RELEASE ORAL
Qty: 90 TABLET | Refills: 0 | Status: SHIPPED | OUTPATIENT
Start: 2019-07-17 | End: 2020-02-14

## 2019-08-09 PROBLEM — H90.3 BILATERAL SENSORINEURAL HEARING LOSS: Status: ACTIVE | Noted: 2018-05-09

## 2019-08-09 PROBLEM — G56.00 CARPAL TUNNEL SYNDROME: Status: ACTIVE | Noted: 2019-08-09

## 2019-08-09 PROBLEM — H93.13 BILATERAL TINNITUS: Status: ACTIVE | Noted: 2018-05-09

## 2019-08-09 PROBLEM — Z87.898 HISTORY OF VERTIGO: Status: ACTIVE | Noted: 2018-05-09

## 2019-08-09 PROBLEM — L97.212 NON-PRESSURE CHRONIC ULCER OF CALF WITH FAT LAYER EXPOSED, RIGHT (HCC): Status: ACTIVE | Noted: 2019-06-05

## 2019-08-09 PROBLEM — M19.049 ARTHRITIS OF HAND: Status: ACTIVE | Noted: 2019-08-09

## 2020-09-14 ENCOUNTER — HOSPITAL ENCOUNTER (OUTPATIENT)
Age: 65
Discharge: HOME | End: 2020-09-16
Payer: COMMERCIAL

## 2020-09-14 ENCOUNTER — HOSPITAL ENCOUNTER (OUTPATIENT)
Dept: GENERAL RADIOLOGY | Age: 65
Discharge: HOME | End: 2020-09-16
Payer: COMMERCIAL

## 2020-09-14 PROCEDURE — 72100 X-RAY EXAM L-S SPINE 2/3 VWS: CPT

## 2020-10-08 ENCOUNTER — OFFICE VISIT (OUTPATIENT)
Dept: PODIATRY | Age: 65
End: 2020-10-08
Payer: COMMERCIAL

## 2020-10-08 VITALS — BODY MASS INDEX: 30.31 KG/M2 | WEIGHT: 200 LBS | HEIGHT: 68 IN

## 2020-10-08 PROCEDURE — 99203 OFFICE O/P NEW LOW 30 MIN: CPT | Performed by: PODIATRIST

## 2020-10-08 PROCEDURE — 11750 EXCISION NAIL&NAIL MATRIX: CPT | Performed by: PODIATRIST

## 2020-10-08 ASSESSMENT — ENCOUNTER SYMPTOMS
NAUSEA: 0
DIARRHEA: 0
CONSTIPATION: 0
COLOR CHANGE: 0
VOMITING: 0

## 2020-10-08 NOTE — PROGRESS NOTES
[facility]  New Patient History and Physical    Chief Complaint:   Chief Complaint   Patient presents with    Ingrown Toenail     left hallux        HPI: Mei Strong is a 63 y.o. male who presents to the office today complaining of ingrown toenail left big toe. Symptoms began several year(s) ago. Patient relates pain is Present. Pain is rated 2 out of 10 and is described as waxing and waning. Treatments prior to today's visit include: debridement. Currently denies F/C/N/V. Allergies   Allergen Reactions    Cat Hair Extract     Dog Epithelium     Other Rash     MD thinks he is allergic to something in the environment. Past Medical History:   Diagnosis Date    Arthritis     Chronic right shoulder pain     Chronic shoulder pain, left 2001    had surgery to repair    Environmental allergies     History of general anesthesia complication 6/63/8099    airway closed off after intubation after cervical neck surgery, was remedied with a counteracting medication    HLD (hyperlipidemia)     Hypertension     DARYL on CPAP 4/22/2015    Dr. Lexine Peabody. Auto, 12-16cm of H20    Respiratory failure, post-operative ([facility] [address] 5/16/2001    due to airway swelling obstruction after intubation due to certain medication    Rotator cuff tear, left 2014    Dr. Phillips Payer cuff tear, right 4/27/2015    Dr. Cindy Epps,        Prior to Admission medications    Medication Sig Start Date End Date Taking?  Authorizing Provider   atorvastatin (LIPITOR) 40 MG tablet Take 1 tablet by mouth nightly 10/5/20 4/3/21 Yes Tavares Rodney MD   losartan (COZAAR) 100 MG tablet take 1 tablet by mouth once daily 9/8/20  Yes Tavares Rodney MD   metoprolol succinate (TOPROL XL) 50 MG extended release tablet take 1 tablet by mouth once daily 9/4/20  Yes Scott Harper MD   hydroCHLOROthiazide (MICROZIDE) 12.5 MG capsule take 1 capsule by mouth once daily 9/4/20  Yes Tavares Rodney MD   sildenafil (VIAGRA) 100 MG tablet Take 1 tablet by mouth as needed for Erectile Dysfunction 6/8/20  Yes Juventino Waddell MD   colchicine (COLCRYS) 0.6 MG tablet Take 1 tablet by mouth daily 6/8/20  Yes Juventino Waddell MD   fexofenadine (ALLEGRA) 180 MG tablet Take 180 mg by mouth   Yes Historical Provider, MD   albuterol sulfate  (90 Base) MCG/ACT inhaler Inhale 2 puffs into the lungs 4 times daily as needed for Wheezing  Patient not taking: Reported on 10/8/2020 2/24/20   Juventino Waddell MD   albuterol sulfate HFA (VENTOLIN HFA) 108 (90 Base) MCG/ACT inhaler Inhale 2 puffs into the lungs every 6 hours as needed for Wheezing  Patient not taking: Reported on 10/8/2020 2/14/20   Juventino Waddell MD   fluticasone [facility]) 50 MCG/ACT nasal spray 1 spray by Nasal route daily 9/4/18 8/9/19  Theo Carnes PA-C       Past Surgical History:   Procedure Laterality Date    AMPUTATION Right     right below knee amputation, due to industrial accident   [address] Left January 2013    CARPAL TUNNEL RELEASE Right 08/03/2016    CERVICAL FUSION      C-spine fusion times 3 levels    CERVICAL LAMINECTOMY  January 2013    C 4-5-6-7    COLONOSCOPY  04/07/2006    COLONOSCOPY N/A 10/24/2018    COLONOSCOPY WITH BIOPSY and snare polypectomy performed by Molly Newman MD at [facility] [place]  HAND SURGERY Left 12/15/2016    Reconstuctive Joint    HAND TENDON SURGERY      repair    HEMORRHOID SURGERY      KNEE SURGERY Bilateral     removed bursa and trimmed cartilage    ROTATOR CUFF REPAIR Left [facility]2014    [facility] [place], open repair    ROTATOR CUFF REPAIR Right 4/27/15    SPINE SURGERY      see cervical laminectomy    TUMOR REMOVAL Left 2015    Dr. Paula Mcdonough begin tumor removed index finger       Family History   Problem Relation Age of Onset    Cancer Mother         breast with mets to brain, bone, lymph    Parkinsonism Father     Arthritis Father     Alzheimer's Disease Father    Calli.Bapt[employer] Heart Disease Maternal Grandmother     Heart Disease Maternal Grandfather        Social History     Tobacco Use    Smoking status: Never Smoker    Smokeless tobacco: Never Used   Substance Use Topics    Alcohol use: Yes     Alcohol/week: 44.0 standard drinks     Types: 14 Standard drinks or equivalent, 30 Shots of liquor per week       Review of Systems   Constitutional: Negative for chills, diaphoresis, fatigue, fever and unexpected weight change. Cardiovascular: Negative for leg swelling. Gastrointestinal: Negative for constipation, diarrhea, nausea and vomiting. Musculoskeletal: Positive for gait problem. Negative for arthralgias and joint swelling. Skin: Negative for color change, pallor, rash and wound. Neurological: Negative for weakness and numbness. Lower Extremity Physical Examination:     Vitals: There were no vitals filed for this visit. General: AAO x 3 in NAD. Vascular: DP and PT pulses palpable 2/4, Left. CFT <3 seconds, Left. Hair growth present to the level of the digits, Left. Edema absent, Left. Varicosities absent, Left. Erythema absent, Left    Neurological:  Sensation present to light touch to level of digits, Left. Musculoskeletal: Muscle strength 5/5, Left. BKA right    Integument: Warm, dry, supple, Bilateral.  Open lesion absent, Bilateral.  Left hallux, medial nail border incurvated, red, painful. Gait analysis:     Asessment: Patient is a 63 y.o. male with:   1. Paronychia of great toe, left    2. Pain of great toe, left          Plan: Patient examined and evaluated. Current condition and treatment options discussed in detail. Verbal and written instructions given to patient. Contact office with any questions/problems/concerns. Verbal consent obtained for chemical matrixectomy after all questions answered. Local anesthesia obtained via Hallux block to the Left hallux utilizing 3 cc of 2% Lidocaine plain and 0.5% Marcaine plain.   Next the Left hallux was prepped with Betadine. A digital tourniquet was applied. A freer elevator was used to free the medial nail border. An english anvil was used to remove the offending border in total.  A curette was used to ensure the offending border was free of any remaining nail. Next, three 30 second applications of 49% Phenol were applied to the offending nail border followed by an isopropyl alcohol flush. Amerigel ointment was applied to the nail border followed by a semi-compressive dressing. The patient was instructed to leave this dressing clean/dry/intact until the following am at which point they will begin application of antibiotic ointment/Amerigel and Band-Aid QD. Patient instructed to take Tylenol or Ibuprofen PRN pain. Post-operative chemical matrixectomy instruction sheet dispensed to patient. Orders Placed This Encounter   Procedures    NE REMOVAL OF NAIL BED     No orders of the defined types were placed in this encounter. RTC in as needed.     10/8/2020

## 2021-01-13 ENCOUNTER — HOSPITAL ENCOUNTER (OUTPATIENT)
Dept: GENERAL RADIOLOGY | Age: 66
Discharge: HOME | End: 2021-01-15
Payer: COMMERCIAL

## 2021-01-13 ENCOUNTER — HOSPITAL ENCOUNTER (OUTPATIENT)
Age: 66
Discharge: HOME | End: 2021-01-15
Payer: COMMERCIAL

## 2021-01-13 DIAGNOSIS — M54.2 NECK PAIN: ICD-10-CM

## 2021-01-13 PROCEDURE — 72050 X-RAY EXAM NECK SPINE 4/5VWS: CPT

## 2021-06-09 ENCOUNTER — INITIAL CONSULT (OUTPATIENT)
Dept: PHYSICAL MEDICINE AND REHAB | Age: 66
End: 2021-06-09
Payer: COMMERCIAL

## 2021-06-09 VITALS
TEMPERATURE: 97.2 F | BODY MASS INDEX: 25.46 KG/M2 | HEIGHT: 68 IN | WEIGHT: 168 LBS | SYSTOLIC BLOOD PRESSURE: 109 MMHG | DIASTOLIC BLOOD PRESSURE: 60 MMHG | HEART RATE: 60 BPM

## 2021-06-09 DIAGNOSIS — S88.111A TRAUMATIC BELOW-KNEE AMPUTATION OF RIGHT LOWER EXTREMITY WITH COMPLICATION, INITIAL ENCOUNTER (HCC): Primary | ICD-10-CM

## 2021-06-09 PROCEDURE — 99203 OFFICE O/P NEW LOW 30 MIN: CPT | Performed by: STUDENT IN AN ORGANIZED HEALTH CARE EDUCATION/TRAINING PROGRAM

## 2021-06-09 NOTE — PROGRESS NOTES
[facility]  [facility] PHYSICAL MEDICINE AND REHABILITATION  [address] [place] [id number]  Dept: [phone]  Dept Fax: [phone]      Lin Cristina is a 63y.o.-year-old male presenting for Leg amputation (Right BKA)  . HPI:     Mr. 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 He notes that he required a revision surgery in the past for a ?neuroma and bone spurring. He does currently have a prosthesis. Any problems with current prosthesis? yes - socket is poorly fitting, despite multiple modifications, due to recent weight loss (45 lbs), and the prosthetic foot is now out of category for his current weight and activity level  He uses a manual wheelchair at home for mobility/ambulation mostly when he is not wearing his prosthesis but also when he is having increased pain in the right residual limb. He also uses crutches sometimes when he is not wearing his prosthesis. He has a cane at home as well. He states that he does not usually use an assistive device in the community if he is wearing his prosthesis. He has participated in physical therapy for training to use prosthesis. The patient is able to don and doff the prosthesis with no assistance. He does not require assistance at home. Patient would like to be able to work on his garden, landscaping, house, and car. He also enjoys visiting family and friends and working out. Areas of pain or weakness:  He notes pain at the distal end of the right residual limb, which he feels is related to \"bottoming out\" with his prosthesis or to increased pressure on the residual limb with the prosthesis. He states that he sometimes has to take off his prosthesis about 10-12 times per day due to pain.   The prosthetist has tried many modifications to the socket to provide better comfort for the limb, but these have not Left 2015    Dr. Houston Mendoza begin tumor removed index finger     Family History   Problem Relation Age of Onset    Cancer Mother         breast with mets to brain, bone, lymph    Parkinsonism Father     Arthritis Father     Alzheimer's Disease Father     Heart Disease Maternal Grandmother     Heart Disease Maternal Grandfather      Social History     Socioeconomic History    Marital status:      Spouse name: None    Number of children: None    Years of education: None    Highest education level: None   Occupational History    Occupation: contractor   Tobacco Use    Smoking status: Never Smoker    Smokeless tobacco: Never Used   Substance and Sexual Activity    Alcohol use: Yes     Alcohol/week: 44.0 standard drinks     Types: 14 Standard drinks or equivalent, 30 Shots of liquor per week    Drug use: No    Sexual activity: Yes     Partners: Female   Other Topics Concern    None   Social History Narrative    None     Social Determinants of Health     Financial Resource Strain: Low Risk     Difficulty of Paying Living Expenses: Not hard at all   Food Insecurity: No Food Insecurity    Worried About Running Out of Food in the Last Year: Never true    Jose of Food in the Last Year: Never true   Transportation Needs:     Lack of Transportation (Medical):      Lack of Transportation (Non-Medical):    Physical Activity:     Days of Exercise per Week:     Minutes of Exercise per Session:    Stress:     Feeling of Stress :    Social Connections:     Frequency of Communication with Friends and Family:     Frequency of Social Gatherings with Friends and Family:     Attends Anabaptism Services:     Active Member of Clubs or Organizations:     Attends Club or Organization Meetings:     Marital Status:    Intimate Partner Violence:     Fear of Current or Ex-Partner:     Emotionally Abused:     Physically Abused:     Sexually Abused:        Current Outpatient Medications   Medication Sig accommodating ground reactive forces and allowing him to traverse uneven terrain. Continued use of his current prosthesis could result in skin breakdown and harm to the patient. 3. The patient has the ability and potential to be a K3 ambulator - the patient has the ability or potential for ambulation with variable charisse typical of the community ambulator who has the ability to traverse most environmental barriers and may have vocational, therapeutic, or exercise activity that demands prosthetic utilization beyond simple locomotion. 4. Will follow up after patient receives the new prosthesis to see if this helps with the pain in the distal right residual limb. Could consider gabapentin in the future if pain is persistent. Return in about 8 weeks (around 8/4/2021).        Electronically signed by Babar Torrez MD on 6/11/2021 at 7:52 AM.

## 2021-06-11 ASSESSMENT — ENCOUNTER SYMPTOMS: BACK PAIN: 0

## 2021-08-02 ENCOUNTER — OFFICE VISIT (OUTPATIENT)
Dept: PHYSICAL MEDICINE AND REHAB | Age: 66
End: 2021-08-02
Payer: COMMERCIAL

## 2021-08-02 VITALS
DIASTOLIC BLOOD PRESSURE: 62 MMHG | TEMPERATURE: 97.7 F | HEART RATE: 64 BPM | BODY MASS INDEX: 22.64 KG/M2 | SYSTOLIC BLOOD PRESSURE: 117 MMHG | HEIGHT: 68 IN | WEIGHT: 149.4 LBS

## 2021-08-02 DIAGNOSIS — M79.604 RIGHT LEG PAIN: Primary | ICD-10-CM

## 2021-08-02 DIAGNOSIS — Z89.511 S/P BKA (BELOW KNEE AMPUTATION) UNILATERAL, RIGHT (HCC): ICD-10-CM

## 2021-08-02 DIAGNOSIS — S88.111A TRAUMATIC BELOW-KNEE AMPUTATION OF RIGHT LOWER EXTREMITY WITH COMPLICATION, INITIAL ENCOUNTER (HCC): ICD-10-CM

## 2021-08-02 PROCEDURE — 99213 OFFICE O/P EST LOW 20 MIN: CPT | Performed by: STUDENT IN AN ORGANIZED HEALTH CARE EDUCATION/TRAINING PROGRAM

## 2021-08-02 RX ORDER — GABAPENTIN 100 MG/1
100 CAPSULE ORAL 3 TIMES DAILY
Qty: 90 CAPSULE | Refills: 0 | Status: SHIPPED | OUTPATIENT
Start: 2021-08-02 | End: 2021-08-16 | Stop reason: DRUGHIGH

## 2021-08-02 NOTE — PROGRESS NOTES
[facility]  [facility] PHYSICAL MEDICINE AND REHABILITATION  [address] [address]  Dept: [phone]  Dept Fax: [phone]    Outpatient Followup Note    Bárbara Baca is a 61y.o.-year-old male presenting for follow up of Leg amputation (BKA)  . HPI:     He was last seen on 6/9/21 for initial evaluation for right below-knee amputation. Since that time, he reports that he has obtained a trial prosthesis with a temporary socket. He states that he and the prosthetist have been working on making adjustments to the socket - he states that he wants to get it right before moving to the permanent prosthesis. He also notes continued weight loss, resulting in changes to the fit of the socket. He now notes constant \"soreness\" at the end of the right residual limb, which he no longer thinks is related to the fit of his prosthesis. This pain is worse with pressure at the end of the residual limb. He states that pain is not worse during the day or at night, although it can keep him awake at night sometimes. He denies any radiation of pain as well as any numbness in the right lower limb. He does report that this pain feels similar to the pain he had with a neuroma in the past.  He states that he seldom has phantom pain in the right lower limb. He states that he takes ibuprofen as needed for pain, which is not helping much. He has tried massage and urea cream as well, which did not help. He also notes taking lyrica about 10-15 years ago but states that it made him too fatigued/tired during the day.       Past Medical History:   Diagnosis Date    Arthritis     Chronic right shoulder pain     Chronic shoulder pain, left 2001    had surgery to repair    Environmental allergies     History of general anesthesia complication 3/92/3998    airway closed off after intubation after cervical neck surgery, was remedied with a counteracting medication    HLD (hyperlipidemia)     Hypertension     DARYL on CPAP 4/22/2015    Dr. Saintclair Murdoch. Auto, 12-16cm of H20    Respiratory failure, post-operative ([address] 5/16/2001    due to airway swelling obstruction after intubation due to certain medication    Rotator cuff tear, left 2014    Dr. Shreya Tang cuff tear, right 4/27/2015    Dr. Amelia Quiles,       Past Surgical History:   Procedure Laterality Date    AMPUTATION Right     right below knee amputation, due to industrial accident   [address] Left January 2013    CARPAL TUNNEL RELEASE Right 08/03/2016    CERVICAL FUSION      C-spine fusion times 3 levels    CERVICAL LAMINECTOMY  January 2013    C 4-5-6-7    COLONOSCOPY  04/07/2006    COLONOSCOPY N/A 10/24/2018    COLONOSCOPY WITH BIOPSY and snare polypectomy performed by Josh Archer MD at [address] [place] [address]  HAND SURGERY Left 12/15/2016    Reconstuctive Joint    HAND TENDON SURGERY      repair    HEMORRHOID SURGERY      KNEE SURGERY Bilateral     removed bursa and trimmed cartilage    ROTATOR CUFF REPAIR Left 3/2014    Dr. Amelia Quiles, open repair    ROTATOR CUFF REPAIR Right 4/27/15    SPINE SURGERY      see cervical laminectomy   [place] Left 2015    Dr. Emiliana Jones begin tumor removed index finger     Family History   Problem Relation Age of Onset    Cancer Mother         breast with mets to brain, bone, lymph    Parkinsonism Father     Arthritis Father     Alzheimer's Disease Father     Heart Disease Maternal Grandmother     Heart Disease Maternal Grandfather      Social History     Socioeconomic History    Marital status:      Spouse name: None    Number of children: None    Years of education: None    Highest education level: None   Occupational History    Occupation: contractor   Tobacco Use    Smoking status: Never Smoker    Smokeless tobacco: Never Used   Substance and Sexual Activity    Alcohol use:  Yes     Alcohol/week: 44.0 standard drinks     Types: 14 Standard drinks or equivalent, 30 Shots of liquor per week    Drug use: No    Sexual activity: Yes     Partners: Female   Other Topics Concern    None   Social History Narrative    None     Social Determinants of Health     Financial Resource Strain: Low Risk     Difficulty of Paying Living Expenses: Not hard at all   Food Insecurity: No Food Insecurity    Worried About Running Out of Food in the Last Year: Never true    Jose of Food in the Last Year: Never true   Transportation Needs:     Lack of Transportation (Medical):  Lack of Transportation (Non-Medical):    Physical Activity:     Days of Exercise per Week:     Minutes of Exercise per Session:    Stress:     Feeling of Stress :    Social Connections:     Frequency of Communication with Friends and Family:     Frequency of Social Gatherings with Friends and Family:     Attends Jewish Services:     Active Member of Clubs or Organizations:     Attends Club or Organization Meetings:     Marital Status:    Intimate Partner Violence:     Fear of Current or Ex-Partner:     Emotionally Abused:     Physically Abused:     Sexually Abused: Allergies   Allergen Reactions    Cat Hair Extract     Dog Epithelium     Other Rash     MD thinks he is allergic to something in the environment. Current Outpatient Medications   Medication Sig Dispense Refill    gabapentin (NEURONTIN) 100 MG capsule Take 1 capsule by mouth 3 times daily for 30 days.  Intended supply: 30 days 90 capsule 0    metoprolol succinate (TOPROL XL) 25 MG extended release tablet Take one tab daily 30 tablet 2    atorvastatin (LIPITOR) 40 MG tablet take 1 tablet by mouth nightly 90 tablet 1    sildenafil (VIAGRA) 100 MG tablet Take 1 tablet by mouth as needed for Erectile Dysfunction 30 tablet 0    fexofenadine (ALLEGRA) 180 MG tablet Take 180 mg by mouth       aspirin 325 MG tablet Take 325 mg by mouth daily  (Patient not taking: Reported on 8/2/2021)      UREA 10 HYDRATING 10 % cream apply topically as directed if needed for DRY SKIN (CALLUS) DAILY (Patient not taking: Reported on 8/2/2021)      colchicine (COLCRYS) 0.6 MG tablet Take 1 tablet by mouth daily (Patient not taking: Reported on 8/2/2021) 30 tablet 3    albuterol sulfate  (90 Base) MCG/ACT inhaler Inhale 2 puffs into the lungs 4 times daily as needed for Wheezing (Patient not taking: Reported on 8/2/2021) 3 Inhaler 1    albuterol sulfate HFA (VENTOLIN HFA) 108 (90 Base) MCG/ACT inhaler Inhale 2 puffs into the lungs every 6 hours as needed for Wheezing (Patient not taking: Reported on 8/2/2021) 1 Inhaler 3    fluticasone (FLONASE) 50 MCG/ACT nasal spray 1 spray by Nasal route daily 1 Bottle 2     Current Facility-Administered Medications   Medication Dose Route Frequency Provider Last Rate Last Admin    albuterol (PROVENTIL) nebulizer solution 2.5 mg  2.5 mg Nebulization Once Juventino Waddell MD        ipratropium (ATROVENT) 0.02 % nebulizer solution 0.5 mg  0.5 mg Nebulization Once Juventino Waddell MD         Facility-Administered Medications Ordered in Other Visits   Medication Dose Route Frequency Provider Last Rate Last Admin    iothalamate (CONRAY) 43 % injection 15 mL  15 mL Intravenous ONCE PRN Epifanio Brower MD           Subjective:      Review of Systems   Constitutional: Positive for activity change. Musculoskeletal: Positive for gait problem. +right lower limb pain   Skin: Negative for wound. Neurological: Negative for numbness. Objective:     Physical Exam  /62   Pulse 64   Temp 97.7 °F (36.5 °C) (Skin)   Ht 5' 8\" (1.727 m)   Wt 149 lb 6.4 oz (67.8 kg)   BMI 22.72 kg/m²     Constitutional: He appears well-developed and well-nourished. In no distress. HEENT: NCAT, EOMI. Hearing grossly intact. Pulmonary/Chest: Respirations WNL and unlabored. MSK:  Right BKA. Functional ROM in the bilateral lower limbs. Strength 5/5 in the bilateral lower limbs.   Focal tenderness to palpation of the distal end of the right residual limb. No irregular bony prominences noted. Neurological: He is alert and oriented to person, place, and time. Sensation intact to light touch in the bilateral lower limbs. Ambulates without assistive device. Skin: Skin is warm dry and intact with good turgor. No wounds or erythema at the distal aspect of the right lower limb. Psychiatric: He has a normal mood and affect. His behavior is normal. Thought content normal.    Nursing note and vitals reviewed. Assessment:       Diagnosis Orders   1. Right leg pain  XR TIBIA FIBULA RIGHT (2 VIEWS)   2. Traumatic below-knee amputation of right lower extremity with complication, initial encounter ([facility] [address]     3. S/P BKA (below knee amputation) unilateral, right (HCC)  XR TIBIA FIBULA RIGHT (2 VIEWS)        Plan:      - Placed order for x-ray of the right tibia/fibula for evaluation of any abnormality at the distal right residual limb  - Provided prescription for gabapentin 100mg TID  - Could consider MRI or ultrasound of the right lower limb in the future      Orders Placed This Encounter   Procedures    XR TIBIA FIBULA RIGHT (2 VIEWS)     Standing Status:   Future     Standing Expiration Date:   8/2/2022     Order Specific Question:   Reason for exam:     Answer:   Patient with pain at the end of his right residual limb. Please evaluate for any abnormality. Orders Placed This Encounter   Medications    gabapentin (NEURONTIN) 100 MG capsule     Sig: Take 1 capsule by mouth 3 times daily for 30 days. Intended supply: 30 days     Dispense:  90 capsule     Refill:  0     Return in about 2 weeks (around 8/16/2021).        Electronically signed by Jean Pierre Jung MD on 8/10/2021 at 11:31 PM.

## 2021-08-13 ENCOUNTER — HOSPITAL ENCOUNTER (OUTPATIENT)
Dept: GENERAL RADIOLOGY | Age: 66
Discharge: HOME | End: 2021-08-15
Payer: COMMERCIAL

## 2021-08-13 ENCOUNTER — HOSPITAL ENCOUNTER (OUTPATIENT)
Age: 66
Discharge: HOME | End: 2021-08-15
Payer: COMMERCIAL

## 2021-08-13 DIAGNOSIS — Z89.511 S/P BKA (BELOW KNEE AMPUTATION) UNILATERAL, RIGHT (HCC): ICD-10-CM

## 2021-08-13 DIAGNOSIS — M79.604 RIGHT LEG PAIN: ICD-10-CM

## 2021-08-13 PROCEDURE — 73590 X-RAY EXAM OF LOWER LEG: CPT

## 2021-08-16 ENCOUNTER — OFFICE VISIT (OUTPATIENT)
Dept: PHYSICAL MEDICINE AND REHAB | Age: 66
End: 2021-08-16
Payer: COMMERCIAL

## 2021-08-16 VITALS
WEIGHT: 148.2 LBS | HEIGHT: 68 IN | DIASTOLIC BLOOD PRESSURE: 65 MMHG | BODY MASS INDEX: 22.46 KG/M2 | HEART RATE: 65 BPM | SYSTOLIC BLOOD PRESSURE: 117 MMHG | TEMPERATURE: 97.9 F

## 2021-08-16 DIAGNOSIS — S88.111A TRAUMATIC BELOW-KNEE AMPUTATION OF RIGHT LOWER EXTREMITY WITH COMPLICATION, INITIAL ENCOUNTER (HCC): ICD-10-CM

## 2021-08-16 DIAGNOSIS — Z89.511 S/P BKA (BELOW KNEE AMPUTATION) UNILATERAL, RIGHT (HCC): ICD-10-CM

## 2021-08-16 DIAGNOSIS — M79.604 RIGHT LEG PAIN: Primary | ICD-10-CM

## 2021-08-16 PROCEDURE — 99213 OFFICE O/P EST LOW 20 MIN: CPT | Performed by: STUDENT IN AN ORGANIZED HEALTH CARE EDUCATION/TRAINING PROGRAM

## 2021-08-16 RX ORDER — GABAPENTIN 100 MG/1
CAPSULE ORAL
Qty: 90 CAPSULE | Refills: 0 | Status: SHIPPED | OUTPATIENT
Start: 2021-08-16 | End: 2021-09-14 | Stop reason: SDUPTHER

## 2021-08-16 NOTE — PROGRESS NOTES
[facility]  [facility] PHYSICAL MEDICINE AND REHABILITATION  [address] [address]  Dept: [phone]  Dept Fax: [phone]    Outpatient Followup Note    Naomi King is a 61y.o.-year-old male presenting for follow up of Leg amputation (Right BKA)  . HPI:     He was last seen on 8/2/21 for follow up of right below-knee amputation. Since that time, he reports that the pain in his right residual limb is about the same. It continues to be localized to one spot on the distal end of the residual limb. He states that this pain has been present for many years but has been worsening over the last 1-2 years. The pain increases with pressure at the end of the residual limb. He reports that he started taking the gabapentin after the last visit but has not noticed a difference in the pain with this medication. He notes that it is hard for him to ambulate due to the pain. He denies having any surgery on the right lower limb prior to the amputation. He states that he has had \"sores\" or \"blisters\" on the right residual limb previously but no wounds that were \"completely open. \"    He notes continuing to work with the prosthetist on the fit of the new socket for the right BKA. Past Medical History:   Diagnosis Date    Arthritis     Chronic right shoulder pain     Chronic shoulder pain, left 2001    had surgery to repair    Environmental allergies     History of general anesthesia complication 2/60/9734    airway closed off after intubation after cervical neck surgery, was remedied with a counteracting medication    HLD (hyperlipidemia)     Hypertension     DARYL on CPAP 4/22/2015    Dr. Cristy Singh.  Auto, 12-16cm of H20    Respiratory failure, post-operative ([facility] [address] 5/16/2001    due to airway swelling obstruction after intubation due to certain medication    Rotator cuff tear, left 2014    Dr. Shanita Chapman cuff tear, right 4/27/2015    Dr. Emily Hamilton,       Past Surgical History:   Procedure Laterality Date    AMPUTATION Right     right below knee amputation, due to industrial accident   [address] Left January 2013    CARPAL TUNNEL RELEASE Right 08/03/2016    CERVICAL FUSION      C-spine fusion times 3 levels    CERVICAL LAMINECTOMY  January 2013    C 4-5-6-7    COLONOSCOPY  04/07/2006    COLONOSCOPY N/A 10/24/2018    COLONOSCOPY WITH BIOPSY and snare polypectomy performed by Chapis Nix MD at [facility] HAND SURGERY Left 12/15/2016    Reconstuctive Joint    HAND TENDON SURGERY      repair    HEMORRHOID SURGERY      KNEE SURGERY Bilateral     removed bursa and trimmed cartilage    ROTATOR CUFF REPAIR Left 3/2014    Dr. Valarie Willis, open repair    ROTATOR CUFF REPAIR Right 4/27/15    SPINE SURGERY      see cervical laminectomy    TUMOR REMOVAL Left 2015    Dr. Gema allan tumor removed index finger     Family History   Problem Relation Age of Onset    Cancer Mother         breast with mets to brain, bone, lymph    Parkinsonism Father     Arthritis Father     Alzheimer's Disease Father     Heart Disease Maternal Grandmother     Heart Disease Maternal Grandfather      Social History     Socioeconomic History    Marital status:      Spouse name: None    Number of children: None    Years of education: None    Highest education level: None   Occupational History    Occupation: contractor   Tobacco Use    Smoking status: Never Smoker    Smokeless tobacco: Never Used   Substance and Sexual Activity    Alcohol use:  Yes     Alcohol/week: 44.0 standard drinks     Types: 14 Standard drinks or equivalent, 30 Shots of liquor per week    Drug use: No    Sexual activity: Yes     Partners: Female   Other Topics Concern    None   Social History Narrative    None     Social Determinants of Health     Financial Resource Strain: Low Risk     Difficulty of Paying Living Expenses: Not hard at all   Food Insecurity: No Food Insecurity    Worried About Running Out of Food in the Last Year: Never true    Jose of Food in the Last Year: Never true   Transportation Needs:     Lack of Transportation (Medical):  Lack of Transportation (Non-Medical):    Physical Activity:     Days of Exercise per Week:     Minutes of Exercise per Session:    Stress:     Feeling of Stress :    Social Connections:     Frequency of Communication with Friends and Family:     Frequency of Social Gatherings with Friends and Family:     Attends Voodoo Services:     Active Member of Clubs or Organizations:     Attends Club or Organization Meetings:     Marital Status:    Intimate Partner Violence:     Fear of Current or Ex-Partner:     Emotionally Abused:     Physically Abused:     Sexually Abused: Allergies   Allergen Reactions    Cat Hair Extract     Dog Epithelium     Other Rash     MD thinks he is allergic to something in the environment. Current Outpatient Medications   Medication Sig Dispense Refill    gabapentin (NEURONTIN) 100 MG capsule Take 100mg/100mg/200mg for 1 week, followed by 100mg/200mg/200mg for 1 week, followed by 200mg TID ongoing.  90 capsule 0    metoprolol succinate (TOPROL XL) 25 MG extended release tablet Take one tab daily 30 tablet 2    atorvastatin (LIPITOR) 40 MG tablet take 1 tablet by mouth nightly 90 tablet 1    aspirin 325 MG tablet Take 325 mg by mouth daily       UREA 10 HYDRATING 10 % cream apply topically as directed if needed for DRY SKIN (CALLUS) DAILY      sildenafil (VIAGRA) 100 MG tablet Take 1 tablet by mouth as needed for Erectile Dysfunction 30 tablet 0    colchicine (COLCRYS) 0.6 MG tablet Take 1 tablet by mouth daily 30 tablet 3    albuterol sulfate  (90 Base) MCG/ACT inhaler Inhale 2 puffs into the lungs 4 times daily as needed for Wheezing 3 Inhaler 1    albuterol sulfate HFA (VENTOLIN HFA) 108 (90 Base) MCG/ACT inhaler Inhale 2 puffs into the lungs every 6 hours as needed for Wheezing 1 Inhaler 3    fexofenadine (ALLEGRA) 180 MG tablet Take 180 mg by mouth       fluticasone (FLONASE) 50 MCG/ACT nasal spray 1 spray by Nasal route daily 1 Bottle 2     Current Facility-Administered Medications   Medication Dose Route Frequency Provider Last Rate Last Admin    albuterol (PROVENTIL) nebulizer solution 2.5 mg  2.5 mg Nebulization Once Rafael Vogel MD        ipratropium (ATROVENT) 0.02 % nebulizer solution 0.5 mg  0.5 mg Nebulization Once Rafael Vogel MD         Facility-Administered Medications Ordered in Other Visits   Medication Dose Route Frequency Provider Last Rate Last Admin    iothalamate (CONRAY) 43 % injection 15 mL  15 mL Intravenous ONCE PRN Alejandra Shultz MD           Subjective:      Review of Systems   Constitutional: Positive for activity change. Musculoskeletal: Positive for gait problem. +right residual limb pain   Skin: Negative for color change and wound. Objective:     Physical Exam  /65   Pulse 65   Temp 97.9 °F (36.6 °C) (Skin)   Ht 5' 8\" (1.727 m)   Wt 148 lb 3.2 oz (67.2 kg)   BMI 22.53 kg/m²     Constitutional: He appears well-developed and well-nourished. In no distress. HEENT: NCAT, EOMI. Hearing grossly intact. Pulmonary/Chest: Respirations WNL and unlabored. MSK: Right BKA. Functional ROM in the bilateral lower limbs. Strength 5/5 in the right lower limb. Focal tenderness to palpation at the distal end of the right residual limb. Neurological: He is alert and oriented to person, place, and time. Sensation to light touch intact in the right lower limb. Ambulates with right lower limb prosthesis but no assistive device. Skin: Skin is warm dry and intact with good turgor. No wounds at the distal aspect of the right lower limb. Mild erythema present at the distal right residual limb over the end of the tibia. Psychiatric: He has a normal mood and affect.  His behavior is normal. Thought

## 2021-08-17 ASSESSMENT — ENCOUNTER SYMPTOMS: COLOR CHANGE: 0

## 2021-08-19 ENCOUNTER — TELEPHONE (OUTPATIENT)
Dept: PHYSICAL MEDICINE AND REHAB | Age: 66
End: 2021-08-19

## 2021-08-19 NOTE — TELEPHONE ENCOUNTER
[facility] radiology called re: order for Oziel's MRI. They are suggesting MRI with and without contrast. She said she reviewed your notes and if looking for infection should consider with/without. Do you wish to change the order?

## 2021-09-13 PROBLEM — L84 CALLUS: Status: ACTIVE | Noted: 2020-12-31

## 2021-09-13 PROBLEM — Z89.511 S/P BELOW KNEE AMPUTATION, RIGHT (HCC): Status: ACTIVE | Noted: 2020-12-31

## 2021-09-14 ENCOUNTER — HOSPITAL ENCOUNTER (OUTPATIENT)
Dept: MRI IMAGING | Age: 66
Discharge: HOME | End: 2021-09-16
Payer: COMMERCIAL

## 2021-09-14 DIAGNOSIS — Z89.511 S/P BKA (BELOW KNEE AMPUTATION) UNILATERAL, RIGHT (HCC): ICD-10-CM

## 2021-09-14 DIAGNOSIS — S88.111A TRAUMATIC BELOW-KNEE AMPUTATION OF RIGHT LOWER EXTREMITY WITH COMPLICATION, INITIAL ENCOUNTER (HCC): ICD-10-CM

## 2021-09-14 DIAGNOSIS — M79.604 RIGHT LEG PAIN: ICD-10-CM

## 2021-09-14 LAB
BUN BLDV-MCNC: 18 MG/DL (ref 8–23)
CREAT SERPL-MCNC: 0.68 MG/DL (ref 0.7–1.2)
GFR AFRICAN AMERICAN: >60 ML/MIN
GFR NON-AFRICAN AMERICAN: >60 ML/MIN
GFR SERPL CREATININE-BSD FRML MDRD: ABNORMAL ML/MIN/{1.73_M2}
GFR SERPL CREATININE-BSD FRML MDRD: ABNORMAL ML/MIN/{1.73_M2}

## 2021-09-14 PROCEDURE — 82565 ASSAY OF CREATININE: CPT

## 2021-09-14 PROCEDURE — 6360000004 HC RX CONTRAST MEDICATION: Performed by: STUDENT IN AN ORGANIZED HEALTH CARE EDUCATION/TRAINING PROGRAM

## 2021-09-14 PROCEDURE — 2580000003 HC RX 258: Performed by: STUDENT IN AN ORGANIZED HEALTH CARE EDUCATION/TRAINING PROGRAM

## 2021-09-14 PROCEDURE — A9579 GAD-BASE MR CONTRAST NOS,1ML: HCPCS | Performed by: STUDENT IN AN ORGANIZED HEALTH CARE EDUCATION/TRAINING PROGRAM

## 2021-09-14 PROCEDURE — 36415 COLL VENOUS BLD VENIPUNCTURE: CPT

## 2021-09-14 PROCEDURE — 73720 MRI LWR EXTREMITY W/O&W/DYE: CPT

## 2021-09-14 PROCEDURE — 84520 ASSAY OF UREA NITROGEN: CPT

## 2021-09-14 RX ORDER — GABAPENTIN 100 MG/1
CAPSULE ORAL
Qty: 180 CAPSULE | Refills: 1 | Status: SHIPPED | OUTPATIENT
Start: 2021-09-14 | End: 2021-09-28

## 2021-09-14 RX ORDER — SODIUM CHLORIDE 0.9 % (FLUSH) 0.9 %
10 SYRINGE (ML) INJECTION PRN
Status: DISCONTINUED | OUTPATIENT
Start: 2021-09-14 | End: 2021-09-17 | Stop reason: HOSPADM

## 2021-09-14 RX ADMIN — SODIUM CHLORIDE, PRESERVATIVE FREE 10 ML: 5 INJECTION INTRAVENOUS at 15:05

## 2021-09-14 RX ADMIN — GADOTERIDOL 15 ML: 279.3 INJECTION, SOLUTION INTRAVENOUS at 15:05

## 2021-09-14 NOTE — TELEPHONE ENCOUNTER
Pt called for refill of gabapentin 100 mg 2 caps 3 times per day to be sent to [facility]    Pt was last seen 08.16.21  Has MRI scheduled for later today

## 2021-09-28 ENCOUNTER — OFFICE VISIT (OUTPATIENT)
Dept: PHYSICAL MEDICINE AND REHAB | Age: 66
End: 2021-09-28
Payer: COMMERCIAL

## 2021-09-28 VITALS
HEART RATE: 54 BPM | BODY MASS INDEX: 22.37 KG/M2 | WEIGHT: 147.6 LBS | DIASTOLIC BLOOD PRESSURE: 61 MMHG | SYSTOLIC BLOOD PRESSURE: 116 MMHG | HEIGHT: 68 IN | TEMPERATURE: 98.1 F

## 2021-09-28 DIAGNOSIS — M79.604 RIGHT LEG PAIN: Primary | ICD-10-CM

## 2021-09-28 DIAGNOSIS — S88.111A TRAUMATIC BELOW-KNEE AMPUTATION OF RIGHT LOWER EXTREMITY WITH COMPLICATION, INITIAL ENCOUNTER (HCC): ICD-10-CM

## 2021-09-28 DIAGNOSIS — Z89.511 S/P BKA (BELOW KNEE AMPUTATION) UNILATERAL, RIGHT (HCC): ICD-10-CM

## 2021-09-28 PROCEDURE — 99213 OFFICE O/P EST LOW 20 MIN: CPT | Performed by: STUDENT IN AN ORGANIZED HEALTH CARE EDUCATION/TRAINING PROGRAM

## 2021-09-28 RX ORDER — GABAPENTIN 100 MG/1
CAPSULE ORAL
Qty: 270 CAPSULE | Refills: 0 | Status: SHIPPED | OUTPATIENT
Start: 2021-09-28 | End: 2021-11-10

## 2021-09-29 NOTE — PROGRESS NOTES
[facility]  [facility] PHYSICAL MEDICINE AND REHABILITATION  [address] [address]  Dept: [phone]  Dept Fax: [phone]    Outpatient Followup Note    Fabiana Hudson is a 61y.o.-year-old male presenting for follow up of Leg amputation (Right BKA follow up)  . HPI:     He was last seen on 8/16/21 for follow up of right below-knee amputation. Since that time, he reports that the pain in his right residual limb is about the same, localized to one spot at the distal end of the residual limb. He continues to state that pain increases with pressure to this spot and that it is difficult to ambulate due to pain. He has increased gabapentin dose as planned and is now taking 200mg TID. However, he has not noticed any improvement in pain thus far with this medication. He states that pain fluctuates in intensity and some days are not as bad as others. Past Medical History:   Diagnosis Date    Arthritis     Chronic right shoulder pain     Chronic shoulder pain, left 2001    had surgery to repair    Environmental allergies     History of general anesthesia complication 9/65/6876    airway closed off after intubation after cervical neck surgery, was remedied with a counteracting medication    HLD (hyperlipidemia)     Hypertension     DARYL on CPAP 4/22/2015    Dr. Katerina Hahn.  Auto, 12-16cm of H20    Respiratory failure, post-operative ([facility] [address] 5/16/2001    due to airway swelling obstruction after intubation due to certain medication    Rotator cuff tear, left 2014    Dr. Elba Post cuff tear, right 4/27/2015    Dr. Madeleine Gorman,       Past Surgical History:   Procedure Laterality Date    AMPUTATION Right     right below knee amputation, due to industrial accident   [address] Left January 2013    CARPAL TUNNEL RELEASE Right 08/03/2016    CERVICAL FUSION      C-spine fusion times 3 levels    CERVICAL LAMINECTOMY  January 2013    C 4-5-6-7    COLONOSCOPY 04/07/2006    COLONOSCOPY N/A 10/24/2018    COLONOSCOPY WITH BIOPSY and snare polypectomy performed by Lindsey Oswald MD at [address]  HAND SURGERY Left 12/15/2016    Reconstuctive Joint    HAND TENDON SURGERY      repair    HEMORRHOID SURGERY      KNEE SURGERY Bilateral     removed bursa and trimmed cartilage    ROTATOR CUFF REPAIR Left 3/2014    Dr. Emily Hamilton, open repair    ROTATOR CUFF REPAIR Right 4/27/15    SPINE SURGERY      see cervical laminectomy    TUMOR REMOVAL Left 2015    Dr. Matt Augustine begin tumor removed index finger     Family History   Problem Relation Age of Onset    Cancer Mother         breast with mets to brain, bone, lymph    Parkinsonism Father     Arthritis Father     Alzheimer's Disease Father     Heart Disease Maternal Grandmother     Heart Disease Maternal Grandfather      Social History     Socioeconomic History    Marital status:      Spouse name: None    Number of children: None    Years of education: None    Highest education level: None   Occupational History    Occupation: contractor   Tobacco Use    Smoking status: Never Smoker    Smokeless tobacco: Never Used   Substance and Sexual Activity    Alcohol use: Yes     Alcohol/week: 44.0 standard drinks     Types: 14 Standard drinks or equivalent, 30 Shots of liquor per week    Drug use: No    Sexual activity: Yes     Partners: Female   Other Topics Concern    None   Social History Narrative    None     Social Determinants of Health     Financial Resource Strain: Low Risk     Difficulty of Paying Living Expenses: Not hard at all   Food Insecurity: No Food Insecurity    Worried About Running Out of Food in the Last Year: Never true    Jose of Food in the Last Year: Never true   Transportation Needs:     Lack of Transportation (Medical):      Lack of Transportation (Non-Medical):    Physical Activity:     Days of Exercise per Week:     Minutes of Exercise per Session:    Stress:  Feeling of Stress :    Social Connections:     Frequency of Communication with Friends and Family:     Frequency of Social Gatherings with Friends and Family:     Attends Cheondo[employer] Services:     Active Member of Clubs or Organizations:     Attends Club or Organization Meetings:     Marital Status:    Intimate Partner Violence:     Fear of Current or Ex-Partner:     Emotionally Abused:     Physically Abused:     Sexually Abused: Allergies   Allergen Reactions    Cat Hair Extract     Dog Epithelium     Other Rash     MD thinks he is allergic to something in the environment. Current Outpatient Medications   Medication Sig Dispense Refill    gabapentin (NEURONTIN) 100 MG capsule Take 200mg/200mg/300mg for 1 week, followed by 200mg/300mg/300mg for 1 week, then 300mg TID ongoing.  270 capsule 0    sildenafil (VIAGRA) 100 MG tablet take 1 tablet by mouth if needed for ERECTILE DYSFUNCTION 30 tablet 0    metoprolol succinate (TOPROL XL) 25 MG extended release tablet Take one tab daily 30 tablet 2    atorvastatin (LIPITOR) 40 MG tablet take 1 tablet by mouth nightly 90 tablet 1    aspirin 325 MG tablet Take 325 mg by mouth daily       UREA 10 HYDRATING 10 % cream apply topically as directed if needed for DRY SKIN (CALLUS) DAILY      colchicine (COLCRYS) 0.6 MG tablet Take 1 tablet by mouth daily 30 tablet 3    albuterol sulfate HFA (VENTOLIN HFA) 108 (90 Base) MCG/ACT inhaler Inhale 2 puffs into the lungs every 6 hours as needed for Wheezing 1 Inhaler 3    fexofenadine (ALLEGRA) 180 MG tablet Take 180 mg by mouth       albuterol sulfate  (90 Base) MCG/ACT inhaler Inhale 2 puffs into the lungs 4 times daily as needed for Wheezing 3 Inhaler 1    fluticasone (FLONASE) 50 MCG/ACT nasal spray 1 spray by Nasal route daily 1 Bottle 2     Current Facility-Administered Medications   Medication Dose Route Frequency Provider Last Rate Last Admin    albuterol (PROVENTIL) nebulizer solution 2.5 mg  2.5 mg Nebulization Once Joy Weeks MD        ipratropium (ATROVENT) 0.02 % nebulizer solution 0.5 mg  0.5 mg Nebulization Once Joy Weeks MD         Facility-Administered Medications Ordered in Other Visits   Medication Dose Route Frequency Provider Last Rate Last Admin    iothalamate (CONRAY) 43 % injection 15 mL  15 mL IntraVENous ONCE PRN Janette Daniel MD           Subjective:      Review of Systems   Constitutional: Positive for activity change. Musculoskeletal: Positive for gait problem. +right residual limb pain   Neurological: Negative for numbness. Objective:     Physical Exam  /61   Pulse 54   Temp 98.1 °F (36.7 °C) (Skin)   Ht 5' 8\" (1.727 m)   Wt 147 lb 9.6 oz (67 kg)   BMI 22.44 kg/m²     Constitutional: He appears well-developed and well-nourished. In no distress. HEENT: NCAT, EOMI. Hearing grossly intact. Pulmonary/Chest: Respirations WNL and unlabored. MSK:  Right BKA. Functional ROM in bilateral lower limbs. Strength 5/5 in bilateral lower limbs. Focal tenderness to palpation at the distal end of the right residual limb. Neurological: He is alert and oriented to person, place, and time. Sensation to light touch intact in bilateral lower limbs. Ambulates with right lower limb prosthesis but no assistive device. Skin: Skin is warm dry and intact with good turgor. No erythema or wounds at the distal aspect of the right lower limb. Psychiatric: He has a normal mood and affect. His behavior is normal. Thought content normal.    Nursing note and vitals reviewed.     Imaging  MRI right tibia/fibula, 9/14/21:  Impression   Corresponding to the finding on radiographs, there is a linear area of T2   hyperintense marrow signal at the distal tibial stump.  No enhancing sinus   tract is seen extending to bone.  This could be postsurgical in nature and is   not definitive for acute osteomyelitis.  Consider follow-up radiographs or   MRI.     Focal thickening of the tibial nerve distally, measuring 1.5 x 0.7 x 2.0 cm,   raising suspicion for a neuroma.       Nonspecific mild edema involving several muscles in the lower leg with mild   muscle atrophy. Assessment:       Diagnosis Orders   1. Right leg pain  Candy Garcia MD, Vascular Surgery, Ajith Camacho MD, Pain Management, [facility]   2. Traumatic below-knee amputation of right lower extremity with complication, initial encounter [facility])  Candy Garcia MD, Vascular Surgery, Ajith Camacho MD, Pain Management, [facility]   3.  S/P BKA (below knee amputation) unilateral, right ([address]  Candy Garcia MD, Vascular Surgery, Ajith Camacho MD, Pain Management, [place]     - Right residual limb pain may be secondary to neuroma     Plan:      - Reviewed results of MRI right tibia/fibula with patient  - Provided prescription for increasing dose of gabapentin slowly, as below  - Placed referral for pain management for possible injection in the area of the suspected neuroma to see if this helps with pain  - Placed referral for vascular surgery for discussion regarding surgical options for suspected neuroma      Orders Placed This Encounter   Procedures   Candy Garcia MD, Vascular Surgery, [place]     Referral Priority:   Routine     Referral Type:   Eval and Treat     Referral Reason:   Specialty Services Required     Referred to Provider:   Timur Estrada MD     Requested Specialty:   Vascular Surgery     Number of Visits Requested:   Kadi Dupont MD, Pain Management, [place]     Referral Priority:   Routine     Referral Type:   Eval and Treat     Referral Reason:   Specialty Services Required     Referred to Provider:   Harrison Brown MD     Requested Specialty:   Pain Management     Number of Visits Requested:   1     Orders Placed This Encounter   Medications    gabapentin (NEURONTIN) 100 MG capsule     Sig: Take 200mg/200mg/300mg for 1 week, followed by 200mg/300mg/300mg for 1 week, then 300mg TID ongoing. Dispense:  270 capsule     Refill:  0       Return in about 6 weeks (around 11/9/2021).        Electronically signed by Rosa Isela Palmer MD on 9/29/2021 at 11:39 PM.

## 2021-10-05 ENCOUNTER — TELEPHONE (OUTPATIENT)
Dept: VASCULAR SURGERY | Age: 66
End: 2021-10-05

## 2021-10-06 ENCOUNTER — TELEPHONE (OUTPATIENT)
Dept: VASCULAR SURGERY | Age: 66
End: 2021-10-06

## 2021-10-06 NOTE — TELEPHONE ENCOUNTER
----- Message from Monika Ashley MD sent at 10/5/2021  9:03 AM EDT -----  Regarding: RE: Schedule referral  Sometime in October, next few weeks  ----- Message -----  From: Ervin Viveros LPN  Sent: 78/8/1436   3:32 PM EDT  To: Monika Ashley MD, #  Subject: Schedule referral                                Pt referred for traumatic right below-knee amputation in 1978. Since that time, he reports that the pain in his right residual limb is about the same, localized to one spot at the distal end of the residual limb. Has MRI RT Tib-Fib w & w/o contrast and XR in chart. Concern is osteomyelitis. Schedule first available or sooner?   Please advise

## 2021-10-07 ENCOUNTER — HOSPITAL ENCOUNTER (OUTPATIENT)
Age: 66
Setting detail: SPECIMEN
Discharge: HOME | End: 2021-10-07
Payer: COMMERCIAL

## 2021-10-07 DIAGNOSIS — Z13.220 SCREENING FOR HYPERLIPIDEMIA: ICD-10-CM

## 2021-10-07 LAB
CHOLESTEROL, FASTING: 83 MG/DL
CHOLESTEROL/HDL RATIO: 2.2
HDLC SERPL-MCNC: 38 MG/DL
LDL CHOLESTEROL: 33 MG/DL (ref 0–130)
TRIGLYCERIDE, FASTING: 58 MG/DL
VLDLC SERPL CALC-MCNC: ABNORMAL MG/DL (ref 1–30)

## 2021-10-18 ENCOUNTER — INITIAL CONSULT (OUTPATIENT)
Dept: PAIN MANAGEMENT | Age: 66
End: 2021-10-18
Payer: COMMERCIAL

## 2021-10-18 VITALS
HEART RATE: 45 BPM | BODY MASS INDEX: 22.43 KG/M2 | HEIGHT: 68 IN | WEIGHT: 148 LBS | SYSTOLIC BLOOD PRESSURE: 122 MMHG | DIASTOLIC BLOOD PRESSURE: 69 MMHG

## 2021-10-18 DIAGNOSIS — G89.29 OTHER CHRONIC PAIN: ICD-10-CM

## 2021-10-18 DIAGNOSIS — D36.10 NEUROMA: Primary | ICD-10-CM

## 2021-10-18 DIAGNOSIS — Z89.511 S/P BELOW KNEE AMPUTATION, RIGHT (HCC): ICD-10-CM

## 2021-10-18 PROCEDURE — 99215 OFFICE O/P EST HI 40 MIN: CPT | Performed by: PAIN MEDICINE

## 2021-10-18 ASSESSMENT — ENCOUNTER SYMPTOMS
EYES NEGATIVE: 1
POOR WOUND HEALING: 1
RESPIRATORY NEGATIVE: 1
BOWEL INCONTINENCE: 1
BACK PAIN: 1

## 2021-10-18 NOTE — PROGRESS NOTES
HPI:     Leg Pain   The incident occurred more than 1 week ago. The incident occurred at work (amputation 1978). The injury mechanism was a direct blow. The pain is present in the right leg. The quality of the pain is described as shooting. The pain is moderate. The pain has been fluctuating since onset. Associated symptoms include an inability to bear weight and numbness. He reports no foreign bodies present. The symptoms are aggravated by weight bearing and palpation. He has tried acetaminophen, NSAIDs, non-weight bearing, heat, rest and elevation for the symptoms. The treatment provided no relief. MRI with suspected neuroma. Work injury many years ago. Below the knee amputation. Had neuroma surgery years ago. X-ray with some degenerative changes. PM&R note reviewed. Vascular surgery evaluation pending as well. On Neurontin. Patient denies any new neurological symptoms. No bowel or bladder incontinence, no weakness, and no falling. Review of OARRS does not show any aberrant prescription behavior. Medication is helping the patient stay active. Patient denies any side effects and reports adequate analgesia. No sign of misuse/abuse. Past Medical History:   Diagnosis Date    Arthritis     Chronic right shoulder pain     Chronic shoulder pain, left 2001    had surgery to repair    Environmental allergies     History of general anesthesia complication 9/23/0710    airway closed off after intubation after cervical neck surgery, was remedied with a counteracting medication    HLD (hyperlipidemia)     Hypertension     DARYL on CPAP 4/22/2015    Dr. Keesha Ferris.  Auto, 12-16cm of H20    Respiratory failure, post-operative ([facility] [address] 5/16/2001    due to airway swelling obstruction after intubation due to certain medication    Rotator cuff tear, left 2014    Dr. Stalin Sterling cuff tear, right 4/27/2015    Dr. Janine Sierra,        Past Surgical History:   Procedure Laterality Date    AMPUTATION Right     right below knee amputation, due to industrial accident   [address] Left January 2013    CARPAL TUNNEL RELEASE Right 08/03/2016    CERVICAL FUSION      C-spine fusion times 3 levels    CERVICAL LAMINECTOMY  January 2013    C 4-5-6-7    COLONOSCOPY  04/07/2006    COLONOSCOPY N/A 10/24/2018    COLONOSCOPY WITH BIOPSY and snare polypectomy performed by Jericho Russell MD at [address]  HAND SURGERY Left 12/15/2016    Reconstuctive Joint    HAND TENDON SURGERY      repair    HEMORRHOID SURGERY      KNEE SURGERY Bilateral     removed bursa and trimmed cartilage    ROTATOR CUFF REPAIR Left 3/2014    Dr. Don Carrizales, open repair    ROTATOR CUFF REPAIR Right 4/27/15    SPINE SURGERY      see cervical laminectomy    TUMOR REMOVAL Left 2015    Dr. Radha allan tumor removed index finger       Allergies   Allergen Reactions    Cat Hair Extract     Dog Epithelium     Other Rash     MD thinks he is allergic to something in the environment.          Current Outpatient Medications:     gabapentin (NEURONTIN) 100 MG capsule, Take 200mg/200mg/300mg for 1 week, followed by 200mg/300mg/300mg for 1 week, then 300mg TID ongoing., Disp: 270 capsule, Rfl: 0    sildenafil (VIAGRA) 100 MG tablet, take 1 tablet by mouth if needed for ERECTILE DYSFUNCTION, Disp: 30 tablet, Rfl: 0    metoprolol succinate (TOPROL XL) 25 MG extended release tablet, Take one tab daily, Disp: 30 tablet, Rfl: 2    atorvastatin (LIPITOR) 40 MG tablet, take 1 tablet by mouth nightly, Disp: 90 tablet, Rfl: 1    aspirin 325 MG tablet, Take 325 mg by mouth daily , Disp: , Rfl:     UREA 10 HYDRATING 10 % cream, apply topically as directed if needed for DRY SKIN (CALLUS) DAILY, Disp: , Rfl:     colchicine (COLCRYS) 0.6 MG tablet, Take 1 tablet by mouth daily, Disp: 30 tablet, Rfl: 3    fexofenadine (ALLEGRA) 180 MG tablet, Take 180 mg by mouth , Disp: , Rfl:     fluticasone (FLONASE) 50 MCG/ACT nasal spray, 1 spray by Nasal route Systems   Constitutional: Negative. HENT: Negative. Eyes: Negative. Cardiovascular: Positive for leg swelling. Respiratory: Negative. Endocrine: Negative. Hematologic/Lymphatic: Negative. Skin: Positive for poor wound healing. Musculoskeletal: Positive for arthritis, back pain, falls, gout, joint pain, muscle cramps, myalgias and stiffness. Gastrointestinal: Positive for bowel incontinence. Genitourinary: Positive for bladder incontinence. Neurological: Positive for numbness. Psychiatric/Behavioral: Negative. Allergic/Immunologic: Positive for environmental allergies. Physical Exam:  /69   Pulse (!) 45 Comment: Low calorie diet  Ht 5' 8\" (1.727 m)   Wt 148 lb (67.1 kg)   BMI 22.50 kg/m²     Physical Exam  Constitutional:       Appearance: Normal appearance. Pulmonary:      Effort: Pulmonary effort is normal.   Neurological:      Mental Status: He is alert. Psychiatric:         Attention and Perception: Attention and perception normal.         Mood and Affect: Mood and affect normal.         Record/Diagnostics Review:    As above, I did review the imaging      Assessment:  1. Neuroma    2. S/P below knee amputation, right (HCC)    3. Other chronic pain        Treatment Plan:  DISCUSSION: Treatment options discussed with patient and all questions answered to patient's satisfaction. OARRS Review: Reviewed and acceptable for medications prescribed. TREATMENT OPTIONS:   Discussed different treatment options including continued conservative care such as physical therapy, chiropractic care, acupuncture. Discussed different interventional options. Also discussed neuromodulation in the form of spinal cord stimulation. Also discussed surgical evaluation. Try compounding cream to the affected area. U/S guided neuroma injection. Has surgery eval pending    Consider increase in neurontin in the future. Consider Haylie Ramirez M.D.         I have reviewed the chief complaint and history of present illness (including ROS and PFSH) and vital documentation by my staff and I agree with their documentation and have added where applicable.

## 2021-11-05 ENCOUNTER — INITIAL CONSULT (OUTPATIENT)
Dept: VASCULAR SURGERY | Age: 66
End: 2021-11-05
Payer: COMMERCIAL

## 2021-11-05 VITALS
HEART RATE: 67 BPM | RESPIRATION RATE: 18 BRPM | HEIGHT: 68 IN | DIASTOLIC BLOOD PRESSURE: 63 MMHG | BODY MASS INDEX: 22.13 KG/M2 | WEIGHT: 146 LBS | SYSTOLIC BLOOD PRESSURE: 130 MMHG | TEMPERATURE: 97.7 F | OXYGEN SATURATION: 93 %

## 2021-11-05 DIAGNOSIS — Z89.511 S/P BELOW KNEE AMPUTATION, RIGHT (HCC): ICD-10-CM

## 2021-11-05 DIAGNOSIS — T87.33 NEUROMA OF AMPUTATION STUMP OF RIGHT LOWER EXTREMITY (HCC): Primary | ICD-10-CM

## 2021-11-05 PROCEDURE — 99215 OFFICE O/P EST HI 40 MIN: CPT | Performed by: SURGERY

## 2021-11-05 RX ORDER — GABAPENTIN 300 MG/1
300 CAPSULE ORAL 3 TIMES DAILY
COMMUNITY
End: 2021-11-10 | Stop reason: SDUPTHER

## 2021-11-05 NOTE — PROGRESS NOTES
Division of Vascular Surgery        New Consult      Physician Requesting Consult:  Dr. Julia Newsome    Reason for Consult:   Pain at amputation stump    Chief Complaint:     Pain at amputation stump    History of Present Illness:      Paul Beltrán is a 61 y.o. gentleman who presents with pain at his amputation stump that has been going on for a couple of years now. His original amputation was in 1978, he was involved in an accident at work and a steel bar landed on it. He had a neuroma excised in 1980 due to severe pain, but seems like he is having similar pains now. He has had his prosthesis adjusted several times, but has not had any adjustments recently. He using several liners to get the end of his stump some cushion, but still gets the pain and discomfort. DOes not have significant phantom limb pain, mainly just at the end of his stump. He was recently started on Neurontin and hard to  if it has made any difference for him. Medical History:     Past Medical History:   Diagnosis Date    Arthritis     Chronic right shoulder pain     Chronic shoulder pain, left 2001    had surgery to repair    Environmental allergies     History of general anesthesia complication 4/73/7015    airway closed off after intubation after cervical neck surgery, was remedied with a counteracting medication    HLD (hyperlipidemia)     Hypertension     DARYL on CPAP 4/22/2015    Dr. Hina Landis.  Auto, 12-16cm of H20    Respiratory failure, post-operative ([facility] [address] 5/16/2001    due to airway swelling obstruction after intubation due to certain medication    Rotator cuff tear, left 2014    Dr. Albaro Centeno    Rotator cuff tear, right 4/27/2015    Dr. Albaro Centeno,        Surgical History:     Past Surgical History:   Procedure Laterality Date    AMPUTATION Right     right below knee amputation, due to industrial accident   [address] Left January 2013    CARPAL TUNNEL RELEASE Right 08/03/2016    CERVICAL FUSION C-spine fusion times 3 levels    CERVICAL LAMINECTOMY  January 2013    C 4-5-6-7    COLONOSCOPY  04/07/2006    COLONOSCOPY N/A 10/24/2018    COLONOSCOPY WITH BIOPSY and snare polypectomy performed by Scarlett Rosenberg MD at [address]  HAND SURGERY Left 12/15/2016    Reconstuctive Joint    HAND TENDON SURGERY      repair    HEMORRHOID SURGERY      KNEE SURGERY Bilateral     removed bursa and trimmed cartilage    ROTATOR CUFF REPAIR Left 3/2014    Dr. To Gonzalez, open repair    ROTATOR CUFF REPAIR Right 4/27/15    SPINE SURGERY      see cervical laminectomy    TUMOR REMOVAL Left 2015    Dr. Vick Garay begin tumor removed index finger       Family History:     Family History   Problem Relation Age of Onset    Cancer Mother         breast with mets to brain, bone, lymph    Parkinsonism Father     Arthritis Father     Alzheimer's Disease Father     Heart Disease Maternal Grandmother     Heart Disease Maternal Grandfather        Allergies:       Cat hair extract, Dog epithelium, and Other    Medications:      Current Outpatient Medications   Medication Sig Dispense Refill    gabapentin (NEURONTIN) 300 MG capsule Take 300 mg by mouth 3 times daily.  metoprolol succinate (TOPROL XL) 25 MG extended release tablet take 1 tablet by mouth once daily 30 tablet 2    sildenafil (VIAGRA) 100 MG tablet take 1 tablet by mouth if needed for ERECTILE DYSFUNCTION 30 tablet 0    aspirin 325 MG tablet Take 325 mg by mouth daily       UREA 10 HYDRATING 10 % cream apply topically as directed if needed for DRY SKIN (CALLUS) DAILY      colchicine (COLCRYS) 0.6 MG tablet Take 1 tablet by mouth daily 30 tablet 3    fexofenadine (ALLEGRA) 180 MG tablet Take 180 mg by mouth       gabapentin (NEURONTIN) 100 MG capsule Take 200mg/200mg/300mg for 1 week, followed by 200mg/300mg/300mg for 1 week, then 300mg TID ongoing.  270 capsule 0    atorvastatin (LIPITOR) 40 MG tablet take 1 tablet by mouth nightly 90 tablet 1    fluticasone (FLONASE) 50 MCG/ACT nasal spray 1 spray by Nasal route daily 1 Bottle 2     Current Facility-Administered Medications   Medication Dose Route Frequency Provider Last Rate Last Admin    albuterol (PROVENTIL) nebulizer solution 2.5 mg  2.5 mg Nebulization Once Mukesh Herrera MD        ipratropium (ATROVENT) 0.02 % nebulizer solution 0.5 mg  0.5 mg Nebulization Once Mukesh Herrera MD         Facility-Administered Medications Ordered in Other Visits   Medication Dose Route Frequency Provider Last Rate Last Admin    iothalamate (CONRAY) 43 % injection 15 mL  15 mL IntraVENous ONCE PRN Lazarus Lax, MD         Social History:     Tobacco:    reports that he has never smoked. He has never used smokeless tobacco.  Alcohol:      reports current alcohol use of about 44.0 standard drinks of alcohol per week. Drug Use:  reports no history of drug use. Review of Systems:     Review of Systems   Constitutional: Negative for chills and fever. HENT: Negative for congestion. Eyes: Negative for visual disturbance. Respiratory: Negative for chest tightness and shortness of breath. Cardiovascular: Negative for chest pain and leg swelling. Gastrointestinal: Negative for abdominal pain. Endocrine: Negative. Genitourinary: Negative. Musculoskeletal: Positive for arthralgias. Skin: Negative for color change and wound. Allergic/Immunologic: Negative. Neurological: Negative for facial asymmetry, speech difficulty, weakness and numbness. Hematological: Negative. Psychiatric/Behavioral: Negative. Physical Exam:     Vitals:  /63 (Site: Right Upper Arm, Position: Sitting, Cuff Size: Large Adult)   Pulse 67   Temp 97.7 °F (36.5 °C) (Temporal)   Resp 18   Ht 5' 8\" (1.727 m)   Wt 146 lb (66.2 kg)   SpO2 93%   BMI 22.20 kg/m²     Physical Exam  Constitutional:       Appearance: He is well-developed and well-groomed.    Eyes:      Extraocular Movements: Extraocular movements intact. Conjunctiva/sclera: Conjunctivae normal.   Neck:      Vascular: No carotid bruit. Cardiovascular:      Rate and Rhythm: Normal rate and regular rhythm. Pulses:           Radial pulses are 2+ on the right side and 2+ on the left side. Dorsalis pedis pulses are 2+ on the left side. Posterior tibial pulses are 2+ on the left side. Pulmonary:      Effort: Pulmonary effort is normal. No respiratory distress. Abdominal:      Palpations: Abdomen is soft. Tenderness: There is no abdominal tenderness. Musculoskeletal:      Cervical back: Full passive range of motion without pain. Right lower leg: Tenderness (distal aspect of stump) present. No swelling. No edema. Left lower leg: No swelling. No edema. Right Lower Extremity: Right leg is amputated below knee. Feet:      Left foot:      Skin integrity: No ulcer or skin breakdown. Skin:     General: Skin is warm. Capillary Refill: Capillary refill takes less than 2 seconds. Neurological:      Mental Status: He is alert and oriented to person, place, and time. GCS: GCS eye subscore is 4. GCS verbal subscore is 5. GCS motor subscore is 6. Sensory: Sensation is intact. Motor: Motor function is intact. Psychiatric:         Mood and Affect: Mood normal.         Speech: Speech normal.         Behavior: Behavior normal.         Thought Content:  Thought content normal.               Imaging/Labs:         MRI reveals small neuroma at end of tibia nerve    Assessment and Plan:     Pain at below knee amputation stump, neuroma  · Will make referral to PMR, Dr. Karine Finney for evaluation and possible injection of neuroma  · Continue Neurontin, increase does to help with pain and discomfort  · Will re-evaluate after conservative therapy, if still having pain can consider open reveion of his amputation stump  · He will follow up in 3 months for re-evaluation    Electronically signed by Sunita Lara MD on 11/5/21 at 12:40 PM EDT      [address] [facility]  Office: [phone]  Cell: [phone]  Email: [email or web address]

## 2021-11-10 ENCOUNTER — OFFICE VISIT (OUTPATIENT)
Dept: PHYSICAL MEDICINE AND REHAB | Age: 66
End: 2021-11-10
Payer: COMMERCIAL

## 2021-11-10 VITALS
SYSTOLIC BLOOD PRESSURE: 121 MMHG | DIASTOLIC BLOOD PRESSURE: 60 MMHG | BODY MASS INDEX: 22.84 KG/M2 | WEIGHT: 150.2 LBS | HEART RATE: 45 BPM | TEMPERATURE: 97.9 F

## 2021-11-10 DIAGNOSIS — M79.604 RIGHT LEG PAIN: Primary | ICD-10-CM

## 2021-11-10 DIAGNOSIS — S88.111A TRAUMATIC BELOW-KNEE AMPUTATION OF RIGHT LOWER EXTREMITY WITH COMPLICATION, INITIAL ENCOUNTER (HCC): ICD-10-CM

## 2021-11-10 DIAGNOSIS — Z89.511 S/P BKA (BELOW KNEE AMPUTATION) UNILATERAL, RIGHT (HCC): ICD-10-CM

## 2021-11-10 PROCEDURE — 99213 OFFICE O/P EST LOW 20 MIN: CPT | Performed by: STUDENT IN AN ORGANIZED HEALTH CARE EDUCATION/TRAINING PROGRAM

## 2021-11-10 RX ORDER — GABAPENTIN 300 MG/1
CAPSULE ORAL
Qty: 180 CAPSULE | Refills: 0 | Status: SHIPPED | OUTPATIENT
Start: 2021-11-10 | End: 2021-12-08 | Stop reason: SDUPTHER

## 2021-11-10 NOTE — PROGRESS NOTES
[facility]  [facility] PHYSICAL MEDICINE AND REHABILITATION  [address] [address]  Dept: [phone]  Dept Fax: [phone]    Outpatient Followup Note    Isaac Rubalcava is a 61y.o.-year-old male presenting for follow up of right below-knee amputation. HPI:     He was last seen on 9/28/21 for follow up of right BKA. Since that time, he reports that the pain in his right residual limb is about the same. He does note that pain can fluctuate from day to day and that today seems to be a better day. He states that pain can worsen with various activities - it can sometimes be worse with sitting or sometimes with standing. He also notes that sometimes pain is worse while wearing his prosthesis and sometimes it's worse when he takes it off. He still is not sure if the gabapentin is helping with pain or not; he is currently taking 300mg TID. He has not noticed any side effects of the medication. He states that he saw Dr. Blanche Juarez, who talked about a possible ultrasound-guided neuroma injection. He also saw Dr. Branden Burris, who discussed possible surgical intervention if conservative therapy does not help. Past Medical History:   Diagnosis Date    Arthritis     Chronic right shoulder pain     Chronic shoulder pain, left 2001    had surgery to repair    Environmental allergies     History of general anesthesia complication 8/25/2813    airway closed off after intubation after cervical neck surgery, was remedied with a counteracting medication    HLD (hyperlipidemia)     Hypertension     DARYL on CPAP 4/22/2015    Dr. Angie Cool.  Auto, 12-16cm of H20    Respiratory failure, post-operative ([address] 5/16/2001    due to airway swelling obstruction after intubation due to certain medication    Rotator cuff tear, left 2014    Dr. Geovanni Torres    Rotator cuff tear, right 4/27/2015    Dr. Geovanni Torres,       Past Surgical History:   Procedure Laterality Date    AMPUTATION Right     right below knee normal. Thought content normal.    Nursing note and vitals reviewed. Reviewed notes from pain management, vascular surgery. Assessment:       Diagnosis Orders   1. Right leg pain     2. Traumatic below-knee amputation of right lower extremity with complication, initial encounter ([facility] [address]     3. S/P BKA (below knee amputation) unilateral, right ([facility] [address]          Plan:      - Provided prescription for increased dose of gabapentin to titrate as below  - Patient would like to proceed with ultrasound-guided neuroma injection - sent a message to Dr. Serena Nino to see if patient can schedule this injection with him    Orders Placed This Encounter   Medications    gabapentin (NEURONTIN) 300 MG capsule     Sig: Take 300mg/300mg/600mg for 1 week, followed by 300mg/600mg/600mg for 1 week, followed by 600mg three times daily ongoing. Dispense:  180 capsule     Refill:  0       Return in about 1 month (around 12/10/2021).        Electronically signed by Arden Lovelace MD on 11/14/2021 at 11:06 PM.

## 2021-11-14 ASSESSMENT — ENCOUNTER SYMPTOMS
SHORTNESS OF BREATH: 0
ABDOMINAL PAIN: 0
CHEST TIGHTNESS: 0
ALLERGIC/IMMUNOLOGIC NEGATIVE: 1
COLOR CHANGE: 0

## 2021-11-15 ENCOUNTER — TELEPHONE (OUTPATIENT)
Dept: PAIN MANAGEMENT | Age: 66
End: 2021-11-15

## 2021-11-15 NOTE — TELEPHONE ENCOUNTER
Called Elsa with [employer] [place] regarding patient's outstanding C-9 for U/S Guided Neuroma Injection and Physical Therapy - Dry Needling.  No answer, left vm msg for c/b to discuss status of patient's claim #[id number]

## 2021-11-17 NOTE — TELEPHONE ENCOUNTER
Called Elsa again and left  msg for c/b regarding patient's outstanding C-9 for injection and PT - dry needling and whether approved or denied.

## 2021-11-22 NOTE — TELEPHONE ENCOUNTER
Received questions to be answered and faxed to [facility] within 10 business days. Scanned questions into chart. Quinten Asif will ask FRANCIA Mora to review and possibly answer questions so it can be submitted on time.

## 2021-12-06 NOTE — TELEPHONE ENCOUNTER
Per Cheyrl Ochoa, this request needs faxed to the state. Faxed c-9 with progress note and surgical eval note to [employer] of [employer] at [phone].

## 2021-12-08 ENCOUNTER — OFFICE VISIT (OUTPATIENT)
Dept: PHYSICAL MEDICINE AND REHAB | Age: 66
End: 2021-12-08
Payer: COMMERCIAL

## 2021-12-08 VITALS
WEIGHT: 149.4 LBS | HEART RATE: 43 BPM | TEMPERATURE: 97.4 F | DIASTOLIC BLOOD PRESSURE: 72 MMHG | BODY MASS INDEX: 22.64 KG/M2 | HEIGHT: 68 IN | SYSTOLIC BLOOD PRESSURE: 137 MMHG

## 2021-12-08 DIAGNOSIS — D36.13 NEUROMA OF RIGHT LOWER EXTREMITY: Primary | ICD-10-CM

## 2021-12-08 DIAGNOSIS — S88.111A TRAUMATIC BELOW-KNEE AMPUTATION OF RIGHT LOWER EXTREMITY WITH COMPLICATION, INITIAL ENCOUNTER (HCC): ICD-10-CM

## 2021-12-08 DIAGNOSIS — Z89.511 S/P BKA (BELOW KNEE AMPUTATION) UNILATERAL, RIGHT (HCC): ICD-10-CM

## 2021-12-08 PROCEDURE — 99213 OFFICE O/P EST LOW 20 MIN: CPT | Performed by: STUDENT IN AN ORGANIZED HEALTH CARE EDUCATION/TRAINING PROGRAM

## 2021-12-08 RX ORDER — GABAPENTIN 300 MG/1
CAPSULE ORAL
Qty: 270 CAPSULE | Refills: 0 | Status: SHIPPED | OUTPATIENT
Start: 2021-12-08 | End: 2022-01-11 | Stop reason: SDUPTHER

## 2021-12-08 RX ORDER — IBUPROFEN 200 MG
200 TABLET ORAL EVERY 6 HOURS PRN
COMMUNITY

## 2021-12-08 NOTE — PROGRESS NOTES
[facility]  [facility] PHYSICAL MEDICINE AND REHABILITATION  [address] [address]  Dept: [phone]  Dept Fax: [phone]    Outpatient Followup Note    Nehal Lin is a 61y.o.-year-old male presenting for follow up of right below-knee amputation and neuroma. HPI:     He was last seen on 11/10/21 for follow up of right BKA. Since that time, he reports that pain in his right residual limb is a little bit better with taking gabapentin (currently on 600mg TID). He notes that pain is somewhat duller and a little bit more tolerable. He states that he ran out of the medication for a few days prior to his last refill and noticed an increase in pain. He feels that pain could be better controlled. He states that he went hunting last week and had more severe pain at the end of the day after prolonged activity. He denies any side effects of gabapentin. He does note numbness/tingling in the fingertips, right greater than left, and in the 2nd and 3rd digits of the left foot for about 1-2 months. He also reports difficulty with writing. He plans to discuss these symptoms with his PCP at his appointment next week. Past Medical History:   Diagnosis Date    Arthritis     Chronic right shoulder pain     Chronic shoulder pain, left 2001    had surgery to repair    Environmental allergies     History of general anesthesia complication 2/21/6796    airway closed off after intubation after cervical neck surgery, was remedied with a counteracting medication    HLD (hyperlipidemia)     Hypertension     DARYL on CPAP 4/22/2015    Dr. Brenna Rizo.  Auto, 12-16cm of H20    Respiratory failure, post-operative ([facility] [address] 5/16/2001    due to airway swelling obstruction after intubation due to certain medication    Rotator cuff tear, left 2014    Dr. Jerry De Leon    Rotator cuff tear, right 4/27/2015    Dr. Jerry De Leon,       Past Surgical History:   Procedure Laterality Date    AMPUTATION Right right below knee amputation, due to industrial accident   [address] Left January 2013    CARPAL TUNNEL RELEASE Right 08/03/2016    CERVICAL FUSION      C-spine fusion times 3 levels    CERVICAL LAMINECTOMY  January 2013    C 4-5-6-7    COLONOSCOPY  04/07/2006    COLONOSCOPY N/A 10/24/2018    COLONOSCOPY WITH BIOPSY and snare polypectomy performed by Jeanette Marmolejo MD at [address]  HAND SURGERY Left 12/15/2016    Reconstuctive Joint    HAND TENDON SURGERY      repair    HEMORRHOID SURGERY      KNEE SURGERY Bilateral     removed bursa and trimmed cartilage    ROTATOR CUFF REPAIR Left 3/2014    Dr. Gentry Schaumann, open repair    ROTATOR CUFF REPAIR Right 4/27/15    SPINE SURGERY      see cervical laminectomy    TUMOR REMOVAL Left 2015    Dr. Mabel allan tumor removed index finger     Family History   Problem Relation Age of Onset    Cancer Mother         breast with mets to brain, bone, lymph    Parkinsonism Father     Arthritis Father     Alzheimer's Disease Father     Heart Disease Maternal Grandmother     Heart Disease Maternal Grandfather      Social History     Socioeconomic History    Marital status:      Spouse name: Not on file    Number of children: Not on file    Years of education: Not on file    Highest education level: Not on file   Occupational History    Occupation: contractor   Tobacco Use    Smoking status: Never Smoker    Smokeless tobacco: Never Used   Substance and Sexual Activity    Alcohol use:  Yes     Alcohol/week: 44.0 standard drinks     Types: 14 Standard drinks or equivalent, 30 Shots of liquor per week    Drug use: No    Sexual activity: Yes     Partners: Female   Other Topics Concern    Not on file   Social History Narrative    Not on file     Social Determinants of Health     Financial Resource Strain: Low Risk     Difficulty of Paying Living Expenses: Not hard at all   Food Insecurity: No Food Insecurity    Worried About Running Out of Food in the Last Year: Never true    Ran Out of Food in the Last Year: Never true   Transportation Needs:     Lack of Transportation (Medical): Not on file    Lack of Transportation (Non-Medical): Not on file   Physical Activity:     Days of Exercise per Week: Not on file    Minutes of Exercise per Session: Not on file   Stress:     Feeling of Stress : Not on file   Social Connections:     Frequency of Communication with Friends and Family: Not on file    Frequency of Social Gatherings with Friends and Family: Not on file    Attends [employer] Services: Not on file    Active Member of [address] [place] [address] [email or web address] or Organizations: Not on file    Attends Club or Organization Meetings: Not on file    Marital Status: Not on file   Intimate Partner Violence:     Fear of Current or Ex-Partner: Not on file    Emotionally Abused: Not on file    Physically Abused: Not on file    Sexually Abused: Not on file   Housing Stability:     Unable to Pay for Housing in the Last Year: Not on file    Number of [place] in the Last Year: Not on file    Unstable Housing in the Last Year: Not on file       Allergies   Allergen Reactions    Cat Hair Extract     Dog Epithelium     Other Rash     MD thinks he is allergic to something in the environment. Current Outpatient Medications   Medication Sig Dispense Refill    ibuprofen (ADVIL;MOTRIN) 200 MG tablet Take 200 mg by mouth every 6 hours as needed for Pain #1-2 PO QD PRN      gabapentin (NEURONTIN) 300 MG capsule Take 600mg/600mg/900mg for 1 week, followed by 600mg/900mg/900mg for 1 week, followed by 900mg three times daily ongoing.  270 capsule 0    atorvastatin (LIPITOR) 40 MG tablet take 1 tablet by mouth nightly 90 tablet 1    metoprolol succinate (TOPROL XL) 25 MG extended release tablet take 1 tablet by mouth once daily 30 tablet 2    sildenafil (VIAGRA) 100 MG tablet take 1 tablet by mouth if needed for ERECTILE DYSFUNCTION 30 tablet 0    aspirin 325 MG tablet Take 325 mg by mouth daily       UREA 10 HYDRATING 10 % cream apply topically as directed if needed for DRY SKIN (CALLUS) DAILY      colchicine (COLCRYS) 0.6 MG tablet Take 1 tablet by mouth daily 30 tablet 3    fexofenadine (ALLEGRA) 180 MG tablet Take 180 mg by mouth       fluticasone (FLONASE) 50 MCG/ACT nasal spray 1 spray by Nasal route daily 1 Bottle 2     Current Facility-Administered Medications   Medication Dose Route Frequency Provider Last Rate Last Admin    albuterol (PROVENTIL) nebulizer solution 2.5 mg  2.5 mg Nebulization Once Jorge Amaya MD        ipratropium (ATROVENT) 0.02 % nebulizer solution 0.5 mg  0.5 mg Nebulization Once Jorge Amaya MD         Facility-Administered Medications Ordered in Other Visits   Medication Dose Route Frequency Provider Last Rate Last Admin    iothalamate (CONRAY) 43 % injection 15 mL  15 mL IntraVENous ONCE PRN Felisha Garcia MD           Subjective:      Review of Systems   Constitutional: Negative for activity change. Musculoskeletal:        +right residual limb pain   Skin: Negative for wound. Neurological: Positive for numbness. Objective:     Physical Exam  /72   Pulse (!) 43   Temp 97.4 °F (36.3 °C)   Ht 5' 8\" (1.727 m)   Wt 149 lb 6.4 oz (67.8 kg)   BMI 22.72 kg/m²     Constitutional: He appears well-developed and well-nourished. In no distress. HEENT: NCAT, EOM grossly intact. Hearing grossly intact. Pulmonary/Chest: Respirations WNL and unlabored. MSK:  Right BKA. Functional ROM in bilateral lower limbs. Strength 5/5 in right residual limb. Neurological: He is alert and oriented to person, place, and time. Sensation to light touch intact in right residual limb. Ambulates with right lower limb prosthesis and no assistive device. Skin: Skin is warm dry and intact with good turgor. No wounds noted on right residual limb. Mild erythema over the anterior tibia and patella.   Psychiatric: He has a normal mood and affect. His behavior is normal. Thought content normal.    Nursing note and vitals reviewed. Assessment:       Diagnosis Orders   1. Neuroma of right lower extremity     2. Traumatic below-knee amputation of right lower extremity with complication, initial encounter ([facility] [address]     3. S/P BKA (below knee amputation) unilateral, right ([facility] [address]          Plan:      - Provided prescription for gabapentin to titrate as below  - Pain management is planning on doing an injection for the neuroma of the right residual limb - they have submitted documentation for C9 approval but it appears that it was denied. Will see if there is anything this office can do to assist with approval process. - Provided prescription for biomed topical compounded cream to trial for right residual limb pain, as compounded topical medication written by pain management was denied      Orders Placed This Encounter   Medications    gabapentin (NEURONTIN) 300 MG capsule     Sig: Take 600mg/600mg/900mg for 1 week, followed by 600mg/900mg/900mg for 1 week, followed by 900mg three times daily ongoing. Dispense:  270 capsule     Refill:  0       Return in about 1 month (around 1/8/2022).        Electronically signed by Hemanth Bangura MD on 12/9/2021 at 11:57 PM.

## 2021-12-20 NOTE — TELEPHONE ENCOUNTER
Per Deedee Chen answered [facility] questionnaire and Nury Hooks faxed it back to [address] [place] [address] on 12/3/21. Subsequently, [facility] responded with a dismissal of C-9 dated 12/7/21.

## 2022-01-11 ENCOUNTER — OFFICE VISIT (OUTPATIENT)
Dept: PHYSICAL MEDICINE AND REHAB | Age: 67
End: 2022-01-11
Payer: COMMERCIAL

## 2022-01-11 VITALS
TEMPERATURE: 97.9 F | HEART RATE: 51 BPM | HEIGHT: 68 IN | DIASTOLIC BLOOD PRESSURE: 80 MMHG | BODY MASS INDEX: 23.61 KG/M2 | SYSTOLIC BLOOD PRESSURE: 143 MMHG | WEIGHT: 155.8 LBS

## 2022-01-11 DIAGNOSIS — D36.13 NEUROMA OF RIGHT LOWER EXTREMITY: ICD-10-CM

## 2022-01-11 DIAGNOSIS — S88.111A TRAUMATIC BELOW-KNEE AMPUTATION OF RIGHT LOWER EXTREMITY WITH COMPLICATION, INITIAL ENCOUNTER (HCC): Primary | ICD-10-CM

## 2022-01-11 PROCEDURE — 99213 OFFICE O/P EST LOW 20 MIN: CPT | Performed by: STUDENT IN AN ORGANIZED HEALTH CARE EDUCATION/TRAINING PROGRAM

## 2022-01-11 RX ORDER — GABAPENTIN 300 MG/1
900 CAPSULE ORAL 3 TIMES DAILY
Qty: 270 CAPSULE | Refills: 2 | Status: SHIPPED | OUTPATIENT
Start: 2022-01-11 | End: 2022-04-28 | Stop reason: SDUPTHER

## 2022-01-11 NOTE — PROGRESS NOTES
[address] [facility],[address] PHYSICAL MEDICINE AND REHABILITATION  [address] [place] [address]  Dept: [phone]  Dept Fax: [phone]    Outpatient Followup Note    Martin Andino is a 61y.o.-year-old male presenting for follow up of remote traumatic right below-knee amputation and neuroma. HPI:     He was last seen on 12/8/21 for follow up of right BKA and neuroma. Since that time, he notes a little bit of improvement in right residual limb pain. However, he states that he seems to be having more pain at night than he was previously. He is not sure if the biomed topical compounded cream is helping with pain. He states that he is taking gabapentin 900mg TID currently and has not experienced any side effects of the medication. He also reports that he is continuing to work with the prosthetist on obtaining a new right lower limb prosthesis. They are planning on making a new test socket soon. In addition to the pain his is experiencing in the right residual limb, he lost about 45 pounds over the last year. This has changed the fit of his prosthetic socket and put his current prosthetic foot out of category for his current weight and activity level. Otherwise, he states that he recently got an MRI cervical spine, which was ordered by his PCP. Past Medical History:   Diagnosis Date    Arthritis     Chronic right shoulder pain     Chronic shoulder pain, left 2001    had surgery to repair    Environmental allergies     History of general anesthesia complication 9/41/0814    airway closed off after intubation after cervical neck surgery, was remedied with a counteracting medication    HLD (hyperlipidemia)     Hypertension     DARYL on CPAP 4/22/2015    Dr. Mariano Randolph.  Auto, 12-16cm of H20    Respiratory failure, post-operative ([facility] [address] 5/16/2001    due to airway swelling obstruction after intubation due to certain medication    Rotator cuff tear, left 2014    Dr. Price Liang cuff tear, right 4/27/2015    Dr. Deanna Real,       Past Surgical History:   Procedure Laterality Date    AMPUTATION Right     right below knee amputation, due to industrial accident   [address] Left January 2013    CARPAL TUNNEL RELEASE Right 08/03/2016    CERVICAL FUSION      C-spine fusion times 3 levels    CERVICAL LAMINECTOMY  January 2013    C 4-5-6-7    COLONOSCOPY  04/07/2006    COLONOSCOPY N/A 10/24/2018    COLONOSCOPY WITH BIOPSY and snare polypectomy performed by Angeles Rangel MD at [address] [place] [address]  HAND SURGERY Left 12/15/2016    Reconstuctive Joint    HAND TENDON SURGERY      repair    HEMORRHOID SURGERY      KNEE SURGERY Bilateral     removed bursa and trimmed cartilage    ROTATOR CUFF REPAIR Left 3/2014    Dr. Deanna Real, open repair    ROTATOR CUFF REPAIR Right 4/27/15    SPINE SURGERY      see cervical laminectomy    TUMOR REMOVAL Left 2015    Dr. Lexy Todd begin tumor removed index finger     Family History   Problem Relation Age of Onset    Cancer Mother         breast with mets to brain, bone, lymph    Parkinsonism Father     Arthritis Father     Alzheimer's Disease Father     Heart Disease Maternal Grandmother     Heart Disease Maternal Grandfather      Social History     Socioeconomic History    Marital status:      Spouse name: Not on file    Number of children: Not on file    Years of education: Not on file    Highest education level: Not on file   Occupational History    Occupation: contractor   Tobacco Use    Smoking status: Never Smoker    Smokeless tobacco: Never Used   Substance and Sexual Activity    Alcohol use:  Yes     Alcohol/week: 44.0 standard drinks     Types: 14 Standard drinks or equivalent, 30 Shots of liquor per week    Drug use: No    Sexual activity: Yes     Partners: Female   Other Topics Concern    Not on file   Social History Narrative    Not on file     Social Determinants of Health     Financial Resource Strain: Low Risk     Difficulty of Paying Living Expenses: Not hard at all   Food Insecurity: No Food Insecurity    Worried About Running Out of Food in the Last Year: Never true    Jose of Food in the Last Year: Never true   Transportation Needs:     Lack of Transportation (Medical): Not on file    Lack of Transportation (Non-Medical): Not on file   Physical Activity:     Days of Exercise per Week: Not on file    Minutes of Exercise per Session: Not on file   Stress:     Feeling of Stress : Not on file   Social Connections:     Frequency of Communication with Friends and Family: Not on file    Frequency of Social Gatherings with Friends and Family: Not on file    Attends Nondenominational Services: Not on file    Active Member of [address] [place] [address] [employer] or Organizations: Not on file    Attends Club or Organization Meetings: Not on file    Marital Status: Not on file   Intimate Partner Violence:     Fear of Current or Ex-Partner: Not on file    Emotionally Abused: Not on file    Physically Abused: Not on file    Sexually Abused: Not on file   Housing Stability:     Unable to Pay for Housing in the Last Year: Not on file    Number of [place] in the Last Year: Not on file    Unstable Housing in the Last Year: Not on file       Allergies   Allergen Reactions    Cat Hair Extract     Dog Epithelium     Other Rash     MD thinks he is allergic to something in the environment. Current Outpatient Medications   Medication Sig Dispense Refill    gabapentin (NEURONTIN) 300 MG capsule Take 3 capsules by mouth 3 times daily for 90 days.  270 capsule 2    colchicine (COLCRYS) 0.6 MG tablet take 1 tablet by mouth once daily 30 tablet 3    ibuprofen (ADVIL;MOTRIN) 200 MG tablet Take 200 mg by mouth every 6 hours as needed for Pain #1-2 PO QD PRN      atorvastatin (LIPITOR) 40 MG tablet take 1 tablet by mouth nightly 90 tablet 1    metoprolol succinate (TOPROL XL) 25 MG extended release tablet take 1 tablet by mouth once daily 30 tablet 2    sildenafil (VIAGRA) 100 MG tablet take 1 tablet by mouth if needed for ERECTILE DYSFUNCTION 30 tablet 0    aspirin 325 MG tablet Take 325 mg by mouth daily       UREA 10 HYDRATING 10 % cream apply topically as directed if needed for DRY SKIN (CALLUS) DAILY      fexofenadine (ALLEGRA) 180 MG tablet Take 180 mg by mouth       fluticasone (FLONASE) 50 MCG/ACT nasal spray 1 spray by Nasal route daily 1 Bottle 2     Current Facility-Administered Medications   Medication Dose Route Frequency Provider Last Rate Last Admin    albuterol (PROVENTIL) nebulizer solution 2.5 mg  2.5 mg Nebulization Once Elva Og MD        ipratropium (ATROVENT) 0.02 % nebulizer solution 0.5 mg  0.5 mg Nebulization Once Elva Og MD         Facility-Administered Medications Ordered in Other Visits   Medication Dose Route Frequency Provider Last Rate Last Admin    iothalamate (CONRAY) 43 % injection 15 mL  15 mL IntraVENous ONCE PRN Ivan Shone, MD           Subjective:      Review of Systems   Musculoskeletal:        +right residual limb pain   Skin: Negative for wound. Neurological: Positive for numbness. Objective:     Physical Exam  BP (!) 143/80   Pulse 51   Temp 97.9 °F (36.6 °C)   Ht 5' 8\" (1.727 m)   Wt 155 lb 12.8 oz (70.7 kg)   BMI 23.69 kg/m²     Constitutional: He appears well-developed and well-nourished. In no distress. HEENT: NCAT, EOM grossly intact. Hearing grossly intact. Pulmonary/Chest: Respirations WNL and unlabored. MSK: Right BKA. Tenderness to palpation at the distal end of the right residual limb. Functional ROM in bilateral lower limbs. Strength 5/5 in right residual limb. Able to don and doff the right lower limb prosthesis without assistance. Neurological: He is alert and oriented to person, place, and time. Sensation to light touch intact in right residual limb.   Ambulates with right lower limb prosthesis and no assistive device. Skin: Skin is warm dry and intact with good turgor. Some erythema present over bony prominences on right residual limb. No wounds noted on the right residual limb. Psychiatric: He has a normal mood and affect. His behavior is normal. Thought content normal.    Nursing note and vitals reviewed. Reviewed notes from prosthetist.      Assessment:       Diagnosis Orders   1. Traumatic below-knee amputation of right lower extremity with complication, initial encounter ([facility] [address]     2. Neuroma of right lower extremity          Plan:       - Continue gabapentin 900mg TID, as this medication is helping a little bit with the right residual limb pain (provided refill today)  - Continue biomed topical compounded cream as needed  - Could consider trial of nortriptyline in the future  - Waiting on appeal for ultrasound-guided neuroma injection  - He is a previously high-functioning individual; is highly motivated; and would like to continue ambulating and completing ADLs independently, gardening, visiting family and friends, doing home repairs, and working out. After review with the prosthetist, I agree this patient would benefit from a total contact acrylic socket with flexible insert, a flex walk system prosthetic foot with vacuum pump, two seal-in liners, and multi-ply prosthetic socks. He has the ability and potential to be a K3 ambulator - the patient has the ability or potential for ambulation with variable charisse typical of the community ambulator who has the ability to traverse most environmental barriers and may have vocational, therapeutic, or exercise activity that demands prosthetic utilization beyond simple locomotion.  - Follow up after more information on the appeal is obtained      Orders Placed This Encounter   Medications    gabapentin (NEURONTIN) 300 MG capsule     Sig: Take 3 capsules by mouth 3 times daily for 90 days.      Dispense:  270 capsule     Refill:  2 Electronically signed by Nilda Ferris MD on 1/16/2022 at 8:59 PM.

## 2022-02-17 ENCOUNTER — OFFICE VISIT (OUTPATIENT)
Dept: VASCULAR SURGERY | Age: 67
End: 2022-02-17
Payer: COMMERCIAL

## 2022-02-17 VITALS
SYSTOLIC BLOOD PRESSURE: 113 MMHG | DIASTOLIC BLOOD PRESSURE: 58 MMHG | WEIGHT: 163 LBS | OXYGEN SATURATION: 99 % | BODY MASS INDEX: 24.71 KG/M2 | TEMPERATURE: 97.2 F | RESPIRATION RATE: 16 BRPM | HEIGHT: 68 IN | HEART RATE: 48 BPM

## 2022-02-17 DIAGNOSIS — T87.33 NEUROMA OF AMPUTATION STUMP OF RIGHT LOWER EXTREMITY (HCC): Primary | ICD-10-CM

## 2022-02-17 DIAGNOSIS — Z89.511 S/P BELOW KNEE AMPUTATION, RIGHT (HCC): ICD-10-CM

## 2022-02-17 PROCEDURE — 99214 OFFICE O/P EST MOD 30 MIN: CPT | Performed by: SURGERY

## 2022-02-17 NOTE — PROGRESS NOTES
Division of Vascular Surgery        Follow Up      Chief Complaint:      Pain over amputation stump    History of Present Illness:      Iliana Colin is a 61 y.o. gentleman who presents for follow up regarding his chronic pain over his amputation stump. He gets sharp excruciating pain at the end of his stump. Imaging revealed likely neuroma formation that is likely his source of irritation and discomfort. He continues to use his prosthesis with appropriate padding. He is waiting on worker's comp to get approved for possible neuroma injection. Hopefull that injections can get approved and we can avoid surgical revision. Neurontin keep the pain at [place] [address]. Iliana Colin is a 61 y.o. gentleman who presents with pain at his amputation stump that has been going on for a couple of years now. His original amputation was in 1978, he was involved in an accident at work and a steel bar landed on it. He had a neuroma excised in 1980 due to severe pain, but seems like he is having similar pains now. He has had his prosthesis adjusted several times, but has not had any adjustments recently. He using several liners to get the end of his stump some cushion, but still gets the pain and discomfort. DOes not have significant phantom limb pain, mainly just at the end of his stump. He was recently started on Neurontin and hard to  if it has made any difference for him. Medical History:     Past Medical History:   Diagnosis Date    Arthritis     Chronic right shoulder pain     Chronic shoulder pain, left 2001    had surgery to repair    Environmental allergies     History of general anesthesia complication 9/01/8145    airway closed off after intubation after cervical neck surgery, was remedied with a counteracting medication    HLD (hyperlipidemia)     Hypertension     DARYL on CPAP 4/22/2015    Dr. Justina Rodriguez.  Auto, 12-16cm of H20    Respiratory failure, post-operative ([facility] [address] 5/16/2001    due to airway swelling obstruction after intubation due to certain medication    Rotator cuff tear, left 2014    Dr. Mortensen Pacheco cuff tear, right 4/27/2015    Dr. Deepti Hung,        Surgical History:     Past Surgical History:   Procedure Laterality Date    AMPUTATION Right     right below knee amputation, due to industrial accident   [address] Left January 2013    CARPAL TUNNEL RELEASE Right 08/03/2016    CERVICAL FUSION      C-spine fusion times 3 levels    CERVICAL LAMINECTOMY  January 2013    C 4-5-6-7    COLONOSCOPY  04/07/2006    COLONOSCOPY N/A 10/24/2018    COLONOSCOPY WITH BIOPSY and snare polypectomy performed by Lionel Sofia MD at [address] [place] [address] HAND SURGERY Left 12/15/2016    Reconstuctive Joint    HAND TENDON SURGERY      repair    HEMORRHOID SURGERY      KNEE SURGERY Bilateral     removed bursa and trimmed cartilage    ROTATOR CUFF REPAIR Left 3/2014    Dr. Deepti Hung, open repair    ROTATOR CUFF REPAIR Right 4/27/15    SPINE SURGERY      see cervical laminectomy    TUMOR REMOVAL Left 2015    Dr. Roshan Acosta begin tumor removed index finger       Family History:     Family History   Problem Relation Age of Onset    Cancer Mother         breast with mets to brain, bone, lymph    Parkinsonism Father     Arthritis Father     Alzheimer's Disease Father     Heart Disease Maternal Grandmother     Heart Disease Maternal Grandfather        Allergies:       Cat hair extract, Dog epithelium, and Other    Medications:      Current Outpatient Medications   Medication Sig Dispense Refill    gabapentin (NEURONTIN) 300 MG capsule Take 3 capsules by mouth 3 times daily for 90 days.  270 capsule 2    colchicine (COLCRYS) 0.6 MG tablet take 1 tablet by mouth once daily 30 tablet 3    ibuprofen (ADVIL;MOTRIN) 200 MG tablet Take 200 mg by mouth every 6 hours as needed for Pain #1-2 PO QD PRN      atorvastatin (LIPITOR) 40 MG tablet take 1 tablet by mouth nightly 90 tablet 1    metoprolol succinate (TOPROL XL) 25 MG extended release tablet take 1 tablet by mouth once daily 30 tablet 2    sildenafil (VIAGRA) 100 MG tablet take 1 tablet by mouth if needed for ERECTILE DYSFUNCTION 30 tablet 0    aspirin 325 MG tablet Take 325 mg by mouth daily       UREA 10 HYDRATING 10 % cream apply topically as directed if needed for DRY SKIN (CALLUS) DAILY      fexofenadine (ALLEGRA) 180 MG tablet Take 180 mg by mouth       fluticasone (FLONASE) 50 MCG/ACT nasal spray 1 spray by Nasal route daily 1 Bottle 2     Current Facility-Administered Medications   Medication Dose Route Frequency Provider Last Rate Last Admin    albuterol (PROVENTIL) nebulizer solution 2.5 mg  2.5 mg Nebulization Once Melony Lackey MD        ipratropium (ATROVENT) 0.02 % nebulizer solution 0.5 mg  0.5 mg Nebulization Once Melony Lackey MD         Facility-Administered Medications Ordered in Other Visits   Medication Dose Route Frequency Provider Last Rate Last Admin    iothalamate (CONRAY) 43 % injection 15 mL  15 mL IntraVENous ONCE PRN Zachery Nelson MD         Social History:     Tobacco:    reports that he has never smoked. He has never used smokeless tobacco.  Alcohol:      reports current alcohol use of about 44.0 standard drinks of alcohol per week. Drug Use:  reports no history of drug use. Review of Systems:     Review of Systems   Constitutional: Negative for chills and fever. HENT: Negative for congestion. Eyes: Negative for visual disturbance. Respiratory: Negative for chest tightness and shortness of breath. Cardiovascular: Negative for chest pain and leg swelling. Gastrointestinal: Negative for abdominal pain. Endocrine: Negative. Genitourinary: Negative. Musculoskeletal: Positive for arthralgias. Skin: Negative for color change and wound. Allergic/Immunologic: Negative. Neurological: Negative for facial asymmetry, speech difficulty, weakness and numbness. Hematological: Negative. Psychiatric/Behavioral: Negative. Physical Exam:     Vitals:  BP (!) 113/58 (Site: Left Upper Arm, Position: Sitting, Cuff Size: Medium Adult)   Pulse (!) 48   Temp 97.2 °F (36.2 °C) (Temporal)   Resp 16   Ht 5' 8\" (1.727 m)   Wt 163 lb (73.9 kg)   SpO2 99%   BMI 24.78 kg/m²     Physical Exam  Constitutional:       Appearance: He is well-developed and well-groomed. Eyes:      Extraocular Movements: Extraocular movements intact. Conjunctiva/sclera: Conjunctivae normal.   Neck:      Vascular: No carotid bruit. Cardiovascular:      Rate and Rhythm: Normal rate and regular rhythm. Pulses:           Radial pulses are 2+ on the right side and 2+ on the left side. Dorsalis pedis pulses are 2+ on the left side. Posterior tibial pulses are 2+ on the left side. Pulmonary:      Effort: Pulmonary effort is normal. No respiratory distress. Abdominal:      Palpations: Abdomen is soft. Tenderness: There is no abdominal tenderness. Musculoskeletal:      Cervical back: Full passive range of motion without pain. Right lower leg: Tenderness (distal aspect of stump) present. No swelling. No edema. Left lower leg: No swelling. No edema. Right Lower Extremity: Right leg is amputated below knee. Feet:      Left foot:      Skin integrity: No ulcer or skin breakdown. Skin:     General: Skin is warm. Capillary Refill: Capillary refill takes less than 2 seconds. Neurological:      Mental Status: He is alert and oriented to person, place, and time. GCS: GCS eye subscore is 4. GCS verbal subscore is 5. GCS motor subscore is 6. Sensory: Sensation is intact. Motor: Motor function is intact. Psychiatric:         Mood and Affect: Mood normal.         Speech: Speech normal.         Behavior: Behavior normal.         Thought Content:  Thought content normal.       Imaging/Labs:         Assessment and Plan:     Pain over right below knee amputation stump from neuroma  · Awaiting injection before considering open revision  · F/u 6 months sooner if things are not working out    Electronically signed by Michel Carmichael MD on 2/17/22 at 2:41 PM [address] [facility]: [phone]  C: [phone]  Email: [email or web address]

## 2022-03-11 PROBLEM — T87.33: Status: ACTIVE | Noted: 2022-03-11

## 2022-03-11 ASSESSMENT — ENCOUNTER SYMPTOMS
SHORTNESS OF BREATH: 0
ABDOMINAL PAIN: 0
ALLERGIC/IMMUNOLOGIC NEGATIVE: 1
COLOR CHANGE: 0
CHEST TIGHTNESS: 0

## 2022-04-06 ENCOUNTER — TELEPHONE (OUTPATIENT)
Dept: PAIN MANAGEMENT | Age: 67
End: 2022-04-06

## 2022-04-06 NOTE — TELEPHONE ENCOUNTER
----- Message from Gaby Fletcher Pura Lewis sent at 3/31/2022 11:08 AM EDT -----  Regarding: FW: [facility] approval for treatment  Please schedule.  ----- Message -----  From: Fidencio Villavicencio  Sent: 3/31/2022   9:35 AM EDT  To: Antoinette Yepez MA, Mehrdad Brower MA  Subject: [facility] approval for treatment                       Ria Pardo and Rolando Barajas,   Dr. Mariama Norris sent her patient, Gagan Morocho, to Dr. Kia Prather for evaluation of painful neuroma (right BKA). Dr. Kia Prather saw the patient and attempted to get approval from [facility] to do injection. Dr. Mariama Norris is his POR and we did an appeal and it was approved in hearing. I have scanned in the approved and updated C-9 into his chart. Please contact the patient to schedule the injection. The C-9 is good through 4/29/22. Thank you.      Catrina Mccarthy, practice manager  Lance Momin MD  [facility] Physical Med/Rehab  [phone]

## 2022-04-06 NOTE — TELEPHONE ENCOUNTER
Pt called back and states he's available and has a ride to have injection procedure tomorrow. Patient is on for 04/07/22 @ 10:30am with a f/u on 04/25/22 @ 8:15am. Vincent Price, will still need an order for this procedure put in the system.

## 2022-04-07 ENCOUNTER — HOSPITAL ENCOUNTER (OUTPATIENT)
Dept: PAIN MANAGEMENT | Facility: CLINIC | Age: 67
Discharge: HOME | End: 2022-04-07

## 2022-04-08 ENCOUNTER — OFFICE VISIT (OUTPATIENT)
Dept: PAIN MANAGEMENT | Age: 67
End: 2022-04-08
Payer: COMMERCIAL

## 2022-04-08 VITALS — BODY MASS INDEX: 24.25 KG/M2 | WEIGHT: 160 LBS | HEIGHT: 68 IN

## 2022-04-08 DIAGNOSIS — Z89.511 S/P BELOW KNEE AMPUTATION, RIGHT (HCC): ICD-10-CM

## 2022-04-08 DIAGNOSIS — D36.10 NEUROMA: Primary | ICD-10-CM

## 2022-04-08 PROCEDURE — 99213 OFFICE O/P EST LOW 20 MIN: CPT | Performed by: PAIN MEDICINE

## 2022-04-08 RX ORDER — DICLOFENAC SODIUM 300 MG/G
CREAM TOPICAL
COMMUNITY

## 2022-04-08 NOTE — PROGRESS NOTES
HPI:     Leg Pain   The incident occurred more than 1 week ago. The injury mechanism was a compression and a direct blow. The pain is present in the right leg. The quality of the pain is described as aching and burning. The pain is at a severity of 4/10. The pain is moderate. The pain has been intermittent since onset. Associated symptoms include an inability to bear weight and muscle weakness. Pertinent negatives include no numbness or tingling. Possible foreign bodies include metal. The symptoms are aggravated by weight bearing, movement and palpation. He has tried NSAIDs, acetaminophen, heat, ice, elevation, non-weight bearing and immobilization (Physical Therapy) for the symptoms. The treatment provided no relief. Having right stump pain. Has seen vascular surgeon. Suggested injections. Patient denies any new neurological symptoms. Nobowel or bladder incontinence, no weakness, and no falling. Review of OARRS does not show any aberrant prescription behavior. Past Medical History:   Diagnosis Date    Arthritis     Chronic right shoulder pain     Chronic shoulder pain, left 2001    had surgery to repair    Environmental allergies     History of general anesthesia complication 3/09/4541    airway closed off after intubation after cervical neck surgery, was remedied with a counteracting medication    HLD (hyperlipidemia)     Hypertension     DARYL on CPAP 4/22/2015    Dr. Biju Kirk.  Auto, 12-16cm of H20    Respiratory failure, post-operative ([facility] [address] 5/16/2001    due to airway swelling obstruction after intubation due to certain medication    Rotator cuff tear, left 2014    Dr. Juan Mejia cuff tear, right 4/27/2015    Dr. Grey Santiago,        Past Surgical History:   Procedure Laterality Date    AMPUTATION Right     right below knee amputation, due to industrial accident   [address] Left January 2013    CARPAL TUNNEL RELEASE Right 08/03/2016    CERVICAL FUSION      C-spine fusion breast with mets to brain, bone, lymph    Parkinsonism Father     Arthritis Father     Alzheimer's Disease Father     Heart Disease Maternal Grandmother     Heart Disease Maternal Grandfather        Social History     Socioeconomic History    Marital status:      Spouse name: Not on file    Number of children: Not on file    Years of education: Not on file    Highest education level: Not on file   Occupational History    Occupation: contractor   Tobacco Use    Smoking status: Never Smoker    Smokeless tobacco: Never Used   Substance and Sexual Activity    Alcohol use: Yes     Alcohol/week: 1.0 standard drink     Types: 1 Glasses of wine per week     Comment: 1 glass of wine every 3 days    Drug use: No    Sexual activity: Yes     Partners: Female   Other Topics Concern    Not on file   Social History Narrative    Not on file     Social Determinants of Health     Financial Resource Strain: Low Risk     Difficulty of Paying Living Expenses: Not hard at all   Food Insecurity: No Food Insecurity    Worried About [address] in the Last Year: Never true    [facility] in the Last Year: Never true   Transportation Needs:     Lack of Transportation (Medical): Not on file    Lack of Transportation (Non-Medical):  Not on file   Physical Activity:     Days of Exercise per Week: Not on file    Minutes of Exercise per Session: Not on file   Stress:     Feeling of Stress : Not on file   Social Connections:     Frequency of Communication with Friends and Family: Not on file    Frequency of Social Gatherings with Friends and Family: Not on file    Attends Spiritism Services: Not on file    Active Member of Clubs or Organizations: Not on file    Attends Club or Organization Meetings: Not on file    Marital Status: Not on file   Intimate Partner Violence:     Fear of Current or Ex-Partner: Not on file    Emotionally Abused: Not on file    Physically Abused: Not on file   Paula Sexually Abused: Not on file   Housing Stability:     Unable to Pay for Housing in the Last Year: Not on file    Number of Places Lived in the Last Year: Not on file    Unstable Housing in the Last Year: Not on file       Review of Systems:  Review of Systems   Neurological: Negative for numbness and tingling. Physical Exam:  Ht 5' 8\" (1.727 m)   Wt 160 lb (72.6 kg)   BMI 24.33 kg/m²     Physical Exam  Constitutional:       Appearance: Normal appearance. Pulmonary:      Effort: Pulmonary effort is normal.   Neurological:      Mental Status: He is alert. Psychiatric:         Attention and Perception: Attention and perception normal.         Mood and Affect: Mood and affect normal.         Record/Diagnostics Review:    As above, I did review the imaging      Assessment:  1. Neuroma    2. S/P below knee amputation, right [facility])        Treatment Plan:  DISCUSSION: Treatment options discussed with patient and all questions answered to patient's satisfaction. OARRS Review: Reviewed and acceptable for medications prescribed. TREATMENT OPTIONS:     Discussed different treatment options including continued conservative care such as physical therapy, chiropractic care, acupuncture. Discussed different interventional options such as epidural steroids or medial branch blocks. Also discussed neuromodulation in the form of spinal cord stimulation. Also discussed surgical evaluation. Wishes to proceed with ultrasound-guided neuroma injection. Will set this up. Wing Spencer M.D. I have reviewed the chief complaint and history of present illness (including ROS and PFSH) and vital documentation by my staff and I agree with their documentation and have added where applicable.

## 2022-04-22 ENCOUNTER — TELEPHONE (OUTPATIENT)
Dept: ADMINISTRATIVE | Age: 67
End: 2022-04-22

## 2022-04-28 RX ORDER — GABAPENTIN 300 MG/1
900 CAPSULE ORAL 3 TIMES DAILY
Qty: 270 CAPSULE | Refills: 2 | Status: SHIPPED | OUTPATIENT
Start: 2022-04-28 | End: 2022-07-25 | Stop reason: SDUPTHER

## 2022-04-28 NOTE — TELEPHONE ENCOUNTER
Patient needs refill of gabapentin. Last rx on 1/11 with 2 refills. No f/u with you scheduled. He notes that he hasn't had neuroma injection with Dr Erwin Paz yet-- they wanted to see him again first in follow up and then they were supposed to schedule for injection-- hasn't happened. Needs ext on c-9 now also. I told him I would take care of the c-9 and see what I can do with appt with Tejinder.

## 2022-05-13 ENCOUNTER — HOSPITAL ENCOUNTER (OUTPATIENT)
Dept: PAIN MANAGEMENT | Facility: CLINIC | Age: 67
Discharge: HOME | End: 2022-05-13
Payer: COMMERCIAL

## 2022-05-13 VITALS
RESPIRATION RATE: 16 BRPM | BODY MASS INDEX: 23.64 KG/M2 | HEIGHT: 68 IN | TEMPERATURE: 96.2 F | SYSTOLIC BLOOD PRESSURE: 131 MMHG | HEART RATE: 45 BPM | OXYGEN SATURATION: 97 % | WEIGHT: 156 LBS | DIASTOLIC BLOOD PRESSURE: 68 MMHG

## 2022-05-13 DIAGNOSIS — R52 PAIN MANAGEMENT: ICD-10-CM

## 2022-05-13 PROCEDURE — 76942 ECHO GUIDE FOR BIOPSY: CPT | Performed by: PAIN MEDICINE

## 2022-05-13 PROCEDURE — 64450 NJX AA&/STRD OTHER PN/BRANCH: CPT

## 2022-05-13 PROCEDURE — 64450 NJX AA&/STRD OTHER PN/BRANCH: CPT | Performed by: PAIN MEDICINE

## 2022-05-13 PROCEDURE — 6360000002 HC RX W HCPCS: Performed by: PAIN MEDICINE

## 2022-05-13 PROCEDURE — 2500000003 HC RX 250 WO HCPCS: Performed by: PAIN MEDICINE

## 2022-05-13 RX ORDER — ATORVASTATIN CALCIUM 40 MG/1
40 TABLET, FILM COATED ORAL DAILY
COMMUNITY
End: 2022-06-15

## 2022-05-13 RX ORDER — BUPIVACAINE HYDROCHLORIDE 2.5 MG/ML
INJECTION, SOLUTION EPIDURAL; INFILTRATION; INTRACAUDAL
Status: COMPLETED | OUTPATIENT
Start: 2022-05-13 | End: 2022-05-13

## 2022-05-13 RX ORDER — DEXAMETHASONE SODIUM PHOSPHATE 10 MG/ML
INJECTION, SOLUTION INTRAMUSCULAR; INTRAVENOUS
Status: COMPLETED | OUTPATIENT
Start: 2022-05-13 | End: 2022-05-13

## 2022-05-13 RX ADMIN — DEXAMETHASONE SODIUM PHOSPHATE 5 MG: 10 INJECTION, SOLUTION INTRAMUSCULAR; INTRAVENOUS at 11:07

## 2022-05-13 RX ADMIN — BUPIVACAINE HYDROCHLORIDE 3 ML: 2.5 INJECTION, SOLUTION EPIDURAL; INFILTRATION; INTRACAUDAL; PERINEURAL at 11:06

## 2022-05-13 ASSESSMENT — PAIN - FUNCTIONAL ASSESSMENT
PAIN_FUNCTIONAL_ASSESSMENT: NONE - DENIES PAIN
PAIN_FUNCTIONAL_ASSESSMENT: PREVENTS OR INTERFERES SOME ACTIVE ACTIVITIES AND ADLS
PAIN_FUNCTIONAL_ASSESSMENT: 0-10

## 2022-05-13 ASSESSMENT — PAIN DESCRIPTION - DESCRIPTORS: DESCRIPTORS: THROBBING

## 2022-05-13 NOTE — OP NOTE
SURGEON: Carlyn Marr    PRE-OP DIAGNOSIS: Stump pain, Neuroma    POST-OP DIAGNOSIS: Same. Procedure performed: Right stump neuroma injection with ultrasound guidance. Physician confirmed and marked the surgical site. EBL: minimal    CONSENT: Patient has undergone the educational process with this procedure, is aware and fully understands the risks involved: potential damage to any and all body organs including possible bleeding, infection, and nerve injury, allergic reaction . Patient also understands that the procedure will be undertaken in a safe, controlled and monitored setting. Patient recognizes that the benefits may include relief from pain and reduction in the oral use of medications. Patient agreed to proceed. PREP: The patient's stump was prepped with chloroprep and draped appropriately. PROCEDURE NOTE: Ultrasound guidance was used to find the appropriate location. A 25 gauge 1.5 inch needle was advanced to the appropriate location under ultrasound guidance guidance. Aspiration was negative. Then  3ml of  0.25% bupivacaine was mixed with 5mg Dexamethasone and slowly injected showing good spread under ultrasound. The needle was withdrawn by the physician and the nurse applied a sterile dressing. The patient tolerated the procedure well. No complications occurred. Patient transferred to the recovery room in satisfactory condition. Appropriate written discharge instructions given to the patient.       [address] [place] [address]

## 2022-05-13 NOTE — H&P
Pain Pre-Op H&P Note    Vivienne Mc MD    HPI: Fiordaliza Putnam  presents with stump pain. Past Medical History:   Diagnosis Date    Arthritis     Chronic right shoulder pain     Chronic shoulder pain, left 2001    had surgery to repair    Environmental allergies     History of general anesthesia complication 5/20/1447    airway closed off after intubation after cervical neck surgery, was remedied with a counteracting medication    HLD (hyperlipidemia)     Hypertension     DARYL on CPAP 4/22/2015    Dr. Jake Keen.  Auto, 12-16cm of H20    Respiratory failure, post-operative ([address] 5/16/2001    due to airway swelling obstruction after intubation due to certain medication    Rotator cuff tear, left 2014    Dr. David Anton cuff tear, right 4/27/2015    Dr. Rosamaria Alfred,        Past Surgical History:   Procedure Laterality Date    AMPUTATION Right     right below knee amputation, due to industrial accident   [address] Left January 2013    CARPAL TUNNEL RELEASE Right 08/03/2016    CERVICAL FUSION      C-spine fusion times 3 levels    CERVICAL LAMINECTOMY  January 2013    C 4-5-6-7    COLONOSCOPY  04/07/2006    COLONOSCOPY N/A 10/24/2018    COLONOSCOPY WITH BIOPSY and snare polypectomy performed by Neno Iglesias MD at [address]  HAND SURGERY Left 12/15/2016    Reconstuctive Joint    HAND TENDON SURGERY      repair    HEMORRHOID SURGERY      KNEE SURGERY Bilateral     removed bursa and trimmed cartilage    ROTATOR CUFF REPAIR Left 3/2014    Dr. Rosamaria Alfred, open repair    ROTATOR CUFF REPAIR Right 4/27/15    SPINE SURGERY      see cervical laminectomy    TUMOR REMOVAL Left 2015    Dr. Marjorie Carpio begin tumor removed index finger       Family History   Problem Relation Age of Onset    Cancer Mother         breast with mets to brain, bone, lymph    Parkinsonism Father     Arthritis Father     Alzheimer's Disease Father     Heart Disease Maternal Grandmother     Heart Disease Maternal Grandfather        Allergies   Allergen Reactions    Cat Hair Extract     Dog Epithelium     Other Rash     MD thinks he is allergic to something in the environment. Current Outpatient Medications:     atorvastatin (LIPITOR) 20 MG tablet, Take 20 mg by mouth daily, Disp: , Rfl:     gabapentin (NEURONTIN) 300 MG capsule, Take 3 capsules by mouth 3 times daily for 90 days. , Disp: 270 capsule, Rfl: 2    metoprolol succinate (TOPROL XL) 25 MG extended release tablet, take 1 tablet by mouth once daily, Disp: 30 tablet, Rfl: 2    Diclofenac Sodium 3 % CREA, Apply topically, Disp: , Rfl:     colchicine (COLCRYS) 0.6 MG tablet, take 1 tablet by mouth once daily, Disp: 30 tablet, Rfl: 3    ibuprofen (ADVIL;MOTRIN) 200 MG tablet, Take 200 mg by mouth every 6 hours as needed for Pain #1-2 PO QD PRN, Disp: , Rfl:     sildenafil (VIAGRA) 100 MG tablet, take 1 tablet by mouth if needed for ERECTILE DYSFUNCTION, Disp: 30 tablet, Rfl: 0    aspirin 325 MG tablet, Take 325 mg by mouth daily , Disp: , Rfl:     UREA 10 HYDRATING 10 % cream, apply topically as directed if needed for DRY SKIN (CALLUS) DAILY, Disp: , Rfl:     fexofenadine (ALLEGRA) 180 MG tablet, Take 180 mg by mouth , Disp: , Rfl:     fluticasone (FLONASE) 50 MCG/ACT nasal spray, 1 spray by Nasal route daily, Disp: 1 Bottle, Rfl: 2    Current Facility-Administered Medications:     albuterol (PROVENTIL) nebulizer solution 2.5 mg, 2.5 mg, Nebulization, Once, Guanaco Randolph MD    ipratropium (ATROVENT) 0.02 % nebulizer solution 0.5 mg, 0.5 mg, Nebulization, Once, Guanaco Randolph MD    Facility-Administered Medications Ordered in Other Encounters:     iothalamate (CONRAY) 43 % injection 15 mL, 15 mL, IntraVENous, ONCE PRN, Brent Khanna MD    Social History     Tobacco Use    Smoking status: Never Smoker    Smokeless tobacco: Never Used   Substance Use Topics    Alcohol use:  Yes     Alcohol/week: 1.0 standard drink Types: 1 Glasses of wine per week     Comment: 1 glass of wine every 3 days       Review of Systems:   Focused review of systems was performed, and negative as pertinent to diagnosis, except as stated in HPI. Physical Exam  Constitutional:       Appearance: Normal appearance. Pulmonary:      Effort: Pulmonary effort is normal.   Neurological:      Mental Status: alert. Psychiatric:         Attention and Perception: Attention and perception normal.         Mood and Affect: Mood and affect normal.   Cardiovascular:      Rate: Normal rate. ASA: 3          Mallampati: 2       Patient's current physical status, medications, medical history, and HPI have been reviewed and updated as appropriate on this date: 05/13/22    Risk/Benefit(s): The risks, benefits, alternatives, and potential complications have been discussed with the patient/family and informed consent has been obtained for the procedure/sedation.     Diagnosis:   Stump pain  Neuroma        Plan: neuroma injection        Holley Weeks MD

## 2022-07-25 ENCOUNTER — OFFICE VISIT (OUTPATIENT)
Dept: PHYSICAL MEDICINE AND REHAB | Age: 67
End: 2022-07-25
Payer: COMMERCIAL

## 2022-07-25 VITALS
SYSTOLIC BLOOD PRESSURE: 149 MMHG | HEART RATE: 54 BPM | HEIGHT: 68 IN | BODY MASS INDEX: 25.46 KG/M2 | WEIGHT: 168 LBS | TEMPERATURE: 97.6 F | DIASTOLIC BLOOD PRESSURE: 72 MMHG

## 2022-07-25 DIAGNOSIS — D36.13 NEUROMA OF RIGHT LOWER EXTREMITY: ICD-10-CM

## 2022-07-25 DIAGNOSIS — S88.111A TRAUMATIC BELOW-KNEE AMPUTATION OF RIGHT LOWER EXTREMITY WITH COMPLICATION, INITIAL ENCOUNTER (HCC): Primary | ICD-10-CM

## 2022-07-25 PROCEDURE — 99214 OFFICE O/P EST MOD 30 MIN: CPT | Performed by: STUDENT IN AN ORGANIZED HEALTH CARE EDUCATION/TRAINING PROGRAM

## 2022-07-25 PROCEDURE — 1123F ACP DISCUSS/DSCN MKR DOCD: CPT | Performed by: STUDENT IN AN ORGANIZED HEALTH CARE EDUCATION/TRAINING PROGRAM

## 2022-07-25 RX ORDER — GABAPENTIN 300 MG/1
900 CAPSULE ORAL 3 TIMES DAILY
Qty: 270 CAPSULE | Refills: 2 | Status: SHIPPED | OUTPATIENT
Start: 2022-07-25 | End: 2022-10-23

## 2022-07-25 RX ORDER — NORTRIPTYLINE HYDROCHLORIDE 10 MG/1
CAPSULE ORAL
Qty: 60 CAPSULE | Refills: 0 | Status: SHIPPED | OUTPATIENT
Start: 2022-07-25 | End: 2022-08-24 | Stop reason: SDUPTHER

## 2022-07-27 NOTE — PROGRESS NOTES
[facility],[address] PHYSICAL MEDICINE AND REHABILITATION  [address] [place] [id number]  Dept: [phone]  Dept Fax: [phone]    Outpatient Followup Note    Cony Thompson is a 74y.o.-year-old male presenting for follow up of remote traumatic right below-knee amputation and neuroma. HPI:     He was last seen on 1/11/22 for follow up of right BKA and neuroma. Since that time, he had an injection for the neuroma with pain management but did not notice any difference in pain. Overall, he feels like the pain is about the same. He states that he can have significant pain at night. He remains on gabapentin 900mg TID at this time, which helps a little bit. He has a follow up appointment with vascular surgery in about 3-4 weeks and is considering surgical intervention for the neuroma since the injection did not help. He would like to be able to walk more for hunting season this fall. He is continuing to work with the prosthetist on obtaining a new prosthesis for the right residual limb. However, now that he may have a surgery, he may hold off on proceeding with the final casting and fitting of the socket for now. Past Medical History:   Diagnosis Date    Arthritis     Chronic right shoulder pain     Chronic shoulder pain, left 2001    had surgery to repair    Environmental allergies     History of general anesthesia complication 6/70/4382    airway closed off after intubation after cervical neck surgery, was remedied with a counteracting medication    HLD (hyperlipidemia)     Hypertension     DARYL on CPAP 4/22/2015    Dr. Yoly Rosado.  Auto, 12-16cm of H20    Respiratory failure, post-operative ([facility] [address] 5/16/2001    due to airway swelling obstruction after intubation due to certain medication    Rotator cuff tear, left 2014    Dr. Shawn Lyon    Rotator cuff tear, right 4/27/2015    Dr. Shawn Lyon,       Past Surgical History:   Procedure Laterality Date AMPUTATION Right     right below knee amputation, due to industrial accident    [address] Left January 2013    CARPAL TUNNEL RELEASE Right 08/03/2016    CERVICAL FUSION      C-spine fusion times 3 levels    CERVICAL LAMINECTOMY  January 2013    C 4-5-6-7    COLONOSCOPY  04/07/2006    COLONOSCOPY N/A 10/24/2018    COLONOSCOPY WITH BIOPSY and snare polypectomy performed by Tara Moon MD at [address] Left 12/15/2016    Reconstuctive Joint    HAND TENDON SURGERY      repair    HEMORRHOID SURGERY      KNEE SURGERY Bilateral     removed bursa and trimmed cartilage    ROTATOR CUFF REPAIR Left 3/2014    Dr. Mary Tellez, open repair    ROTATOR CUFF REPAIR Right 4/27/15    SPINE SURGERY      see cervical laminectomy    TUMOR REMOVAL Left 2015    Dr. Ta Most begin tumor removed index finger     Family History   Problem Relation Age of Onset    Cancer Mother         breast with mets to brain, bone, lymph    Parkinsonism Father     Arthritis Father     Alzheimer's Disease Father     Heart Disease Maternal Grandmother     Heart Disease Maternal Grandfather      Social History     Socioeconomic History    Marital status:    Occupational History    Occupation: contractor   Tobacco Use    Smoking status: Never    Smokeless tobacco: Never   Vaping Use    Vaping Use: Never used   Substance and Sexual Activity    Alcohol use: Yes     Alcohol/week: 1.0 standard drink     Types: 1 Glasses of wine per week     Comment: 1 glass of wine every 3 days    Drug use: No    Sexual activity: Yes     Partners: Female       Allergies   Allergen Reactions    Cat Hair Extract     Dog Epithelium     Other Rash     MD thinks he is allergic to something in the environment.        Current Outpatient Medications   Medication Sig Dispense Refill    nortriptyline (PAMELOR) 10 MG capsule Take 10mg nightly for 1 week, followed by 20mg nightly ongoing 60 capsule 0    gabapentin (NEURONTIN) 300 MG capsule Take 3 capsules by mouth in the morning and 3 capsules at noon and 3 capsules before bedtime. Do all this for 90 days. 270 capsule 2    atorvastatin (LIPITOR) 40 MG tablet take 1 tablet by mouth every evening 90 tablet 1    colchicine (COLCRYS) 0.6 MG tablet Take 1 tablet by mouth daily 30 tablet 1    metoprolol succinate (TOPROL XL) 25 MG extended release tablet take 1 tablet by mouth once daily 30 tablet 2    Diclofenac Sodium 3 % CREA Apply topically      colchicine (COLCRYS) 0.6 MG tablet take 1 tablet by mouth once daily 30 tablet 3    ibuprofen (ADVIL;MOTRIN) 200 MG tablet Take 200 mg by mouth every 6 hours as needed for Pain #1-2 PO QD PRN      sildenafil (VIAGRA) 100 MG tablet take 1 tablet by mouth if needed for ERECTILE DYSFUNCTION 30 tablet 0    aspirin 325 MG tablet Take 325 mg by mouth daily       UREA 10 HYDRATING 10 % cream apply topically as directed if needed for DRY SKIN (CALLUS) DAILY      fexofenadine (ALLEGRA) 180 MG tablet Take 180 mg by mouth       fluticasone (FLONASE) 50 MCG/ACT nasal spray 1 spray by Nasal route daily 1 Bottle 2     Current Facility-Administered Medications   Medication Dose Route Frequency Provider Last Rate Last Admin    albuterol (PROVENTIL) nebulizer solution 2.5 mg  2.5 mg Nebulization Once Kwaku Burgos MD        ipratropium (ATROVENT) 0.02 % nebulizer solution 0.5 mg  0.5 mg Nebulization Once Kwaku Burgos MD         Facility-Administered Medications Ordered in Other Visits   Medication Dose Route Frequency Provider Last Rate Last Admin    iothalamate (CONRAY) 43 % injection 15 mL  15 mL IntraVENous ONCE PRN Zachariah Pope MD           Subjective:      Review of Systems   Musculoskeletal:         +right residual limb pain   Skin:  Negative for wound.      Objective:     Physical Exam  BP (!) 149/72   Pulse 54   Temp 97.6 °F (36.4 °C)   Ht 5' 8\" (1.727 m)   Wt 168 lb (76.2 kg)   BMI 25.54 kg/m²     Constitutional: He appears well-developed and well-nourished. In no distress. HEENT: NCAT, EOM grossly intact. Hearing grossly intact. Pulmonary/Chest: Respirations WNL and unlabored. MSK:  Right BKA. Tenderness to palpation at the distal end of the right residual limb. Strength 5/5 in right residual limb. Neurological: He is alert and oriented to person, place, and time. Sensation to light touch intact in right residual limb. Ambulates with right lower limb prosthesis and no assistive device. Skin: Skin is warm dry and intact with good turgor. Blanchable erythema present over the right patella, anterior tibia, and distal end of the residual limb. Very small scabbed area at the distal end of the residual limb but no open wounds noted. Psychiatric: He has a normal mood and affect. His behavior is normal. Thought content normal.    Nursing note and vitals reviewed. Reviewed notes from pain management. Assessment:       Diagnosis Orders   1. Traumatic below-knee amputation of right lower extremity with complication, initial encounter ([facility] [address]        2. Neuroma of right lower extremity             Plan:      - Continue gabapentin 900mg TID, as he feels like this medication helps some with pain  - Provided prescription for nortriptyline, as below, to see if this provides additional pain relief, particularly at night  - Follow up with vascular surgery as planned to discuss possible surgical intervention for neuroma      Orders Placed This Encounter   Medications    nortriptyline (PAMELOR) 10 MG capsule     Sig: Take 10mg nightly for 1 week, followed by 20mg nightly ongoing     Dispense:  60 capsule     Refill:  0    gabapentin (NEURONTIN) 300 MG capsule     Sig: Take 3 capsules by mouth in the morning and 3 capsules at noon and 3 capsules before bedtime. Do all this for 90 days. Dispense:  270 capsule     Refill:  2     Return in about 1 month (around 8/25/2022).        Electronically signed by Sarha Murray MD on 7/27/2022 at 1:39 PM.

## 2022-08-23 ENCOUNTER — OFFICE VISIT (OUTPATIENT)
Dept: VASCULAR SURGERY | Age: 67
End: 2022-08-23
Payer: COMMERCIAL

## 2022-08-23 VITALS
OXYGEN SATURATION: 96 % | WEIGHT: 165 LBS | DIASTOLIC BLOOD PRESSURE: 73 MMHG | RESPIRATION RATE: 17 BRPM | SYSTOLIC BLOOD PRESSURE: 122 MMHG | BODY MASS INDEX: 25.01 KG/M2 | HEIGHT: 68 IN | HEART RATE: 60 BPM | TEMPERATURE: 96.8 F

## 2022-08-23 DIAGNOSIS — T87.9 BKA STUMP COMPLICATION (HCC): ICD-10-CM

## 2022-08-23 DIAGNOSIS — T87.33 NEUROMA OF AMPUTATION STUMP OF RIGHT LOWER EXTREMITY (HCC): Primary | ICD-10-CM

## 2022-08-23 PROCEDURE — 1123F ACP DISCUSS/DSCN MKR DOCD: CPT | Performed by: SURGERY

## 2022-08-23 PROCEDURE — 99214 OFFICE O/P EST MOD 30 MIN: CPT | Performed by: SURGERY

## 2022-08-23 NOTE — PROGRESS NOTES
Division of Vascular Surgery        Follow Up      Chief Complaint:      Pain over BKA stump    History of Present Illness:      Viraj Morales is a 74 y.o. gentleman who presents for follow up regarding chronic pain over his BKA stump. He continues to have significant pain at the end of his stump, he bears through it so he can wear his prosthesis. He has been using multiple liners and is waiting to get new prosthesis because of considering revision surgery. He did get neuroma injected with no relief. Pain is right at the end of his tibia. He recalls having revision of his BKA 40 years ago at [facility] due to bone spur that poked right out through his skin. He has been trying his best but its getting harder and harder to get around with his prosthesis due to pain and discomfort from his stump. His original amputation was in 1978, he was involved in an accident at work and a steel bar landed on it. He had a neuroma excised in 1980 due to severe pain, but seems like he is having similar pains now. He has had his prosthesis adjusted several times, but has not had any adjustments recently. He using several liners to get the end of his stump some cushion    Medical History:     Past Medical History:   Diagnosis Date    Arthritis     Chronic right shoulder pain     Chronic shoulder pain, left 2001    had surgery to repair    Environmental allergies     History of general anesthesia complication 5/56/8682    airway closed off after intubation after cervical neck surgery, was remedied with a counteracting medication    HLD (hyperlipidemia)     Hypertension     DARYL on CPAP 4/22/2015    Dr. Izzy Braden.  Auto, 12-16cm of H20    Respiratory failure, post-operative ([address] 5/16/2001    due to airway swelling obstruction after intubation due to certain medication    Rotator cuff tear, left 2014    Dr. Rc Sánchez    Rotator cuff tear, right 4/27/2015    Dr. Rc Sánchez,        Surgical History:     Past Surgical History:   Procedure Laterality Date    AMPUTATION Right     right below knee amputation, due to industrial accident    CARPAL TUNNEL RELEASE Left January 2013    CARPAL TUNNEL RELEASE Right 08/03/2016    CERVICAL FUSION      C-spine fusion times 3 levels    CERVICAL LAMINECTOMY  January 2013    C 4-5-6-7    COLONOSCOPY  04/07/2006    COLONOSCOPY N/A 10/24/2018    COLONOSCOPY WITH BIOPSY and snare polypectomy performed by Scarlett Rosenberg MD at [facility] Left 12/15/2016    Reconstuctive Joint    HAND TENDON SURGERY      repair    HEMORRHOID SURGERY      KNEE SURGERY Bilateral     removed bursa and trimmed cartilage    ROTATOR CUFF REPAIR Left 3/2014    Dr. To Gonzalez, open repair    ROTATOR CUFF REPAIR Right 4/27/15    SPINE SURGERY      see cervical laminectomy    TUMOR REMOVAL Left 2015    Dr. Vick allan tumor removed index finger       Family History:     Family History   Problem Relation Age of Onset    Cancer Mother         breast with mets to brain, bone, lymph    Parkinsonism Father     Arthritis Father     Alzheimer's Disease Father     Heart Disease Maternal Grandmother     Heart Disease Maternal Grandfather        Allergies:       Cat hair extract, Dog epithelium, and Other    Medications:      Current Outpatient Medications   Medication Sig Dispense Refill    colchicine (COLCRYS) 0.6 MG tablet Take 1 tablet by mouth in the morning. 30 tablet 2    metoprolol succinate (TOPROL XL) 25 MG extended release tablet take 1 tablet by mouth once daily 30 tablet 2    nortriptyline (PAMELOR) 10 MG capsule Take 10mg nightly for 1 week, followed by 20mg nightly ongoing 60 capsule 0    gabapentin (NEURONTIN) 300 MG capsule Take 3 capsules by mouth in the morning and 3 capsules at noon and 3 capsules before bedtime. Do all this for 90 days.  270 capsule 2    atorvastatin (LIPITOR) 40 MG tablet take 1 tablet by mouth every evening 90 tablet 1    Diclofenac Sodium 3 % CREA Apply topically colchicine (COLCRYS) 0.6 MG tablet take 1 tablet by mouth once daily 30 tablet 3    ibuprofen (ADVIL;MOTRIN) 200 MG tablet Take 200 mg by mouth every 6 hours as needed for Pain #1-2 PO QD PRN      sildenafil (VIAGRA) 100 MG tablet take 1 tablet by mouth if needed for ERECTILE DYSFUNCTION 30 tablet 0    aspirin 325 MG tablet Take 325 mg by mouth daily       UREA 10 HYDRATING 10 % cream apply topically as directed if needed for DRY SKIN (CALLUS) DAILY      fexofenadine (ALLEGRA) 180 MG tablet Take 180 mg by mouth       fluticasone (FLONASE) 50 MCG/ACT nasal spray 1 spray by Nasal route daily 1 Bottle 2     Current Facility-Administered Medications   Medication Dose Route Frequency Provider Last Rate Last Admin    albuterol (PROVENTIL) nebulizer solution 2.5 mg  2.5 mg Nebulization Once Vipin Osborn MD        ipratropium (ATROVENT) 0.02 % nebulizer solution 0.5 mg  0.5 mg Nebulization Once Vipin Osborn MD         Facility-Administered Medications Ordered in Other Visits   Medication Dose Route Frequency Provider Last Rate Last Admin    iothalamate (CONRAY) 43 % injection 15 mL  15 mL IntraVENous ONCE PRN Samuel Sofia MD         Social History:     Tobacco:    reports that he has never smoked. He has never used smokeless tobacco.  Alcohol:      reports current alcohol use of about 1.0 standard drink per week. Drug Use:  reports no history of drug use. Occupation:  Retired  for [employer], contruction , machinery structural steel    Review of Systems:     Review of Systems   Constitutional:  Negative for chills and fever. HENT:  Negative for congestion. Eyes:  Negative for visual disturbance. Respiratory:  Negative for chest tightness and shortness of breath. Cardiovascular:  Negative for chest pain and leg swelling. Gastrointestinal:  Negative for abdominal pain. Endocrine: Negative. Genitourinary: Negative.     Musculoskeletal:  Positive for arthralgias. Skin:  Negative for color change and wound. Allergic/Immunologic: Negative. Neurological:  Negative for facial asymmetry, speech difficulty, weakness and numbness. Hematological: Negative. Psychiatric/Behavioral: Negative. Physical Exam:     Vitals:  /73 (Site: Right Upper Arm, Position: Sitting, Cuff Size: Medium Adult)   Pulse 60   Temp 96.8 °F (36 °C) (Temporal)   Resp 17   Ht 5' 8\" (1.727 m)   Wt 165 lb (74.8 kg)   SpO2 96%   BMI 25.09 kg/m²     Physical Exam  Constitutional:       Appearance: He is well-developed and well-groomed. Eyes:      Extraocular Movements: Extraocular movements intact. Conjunctiva/sclera: Conjunctivae normal.   Neck:      Vascular: No carotid bruit. Cardiovascular:      Rate and Rhythm: Normal rate and regular rhythm. Pulses:           Radial pulses are 2+ on the right side and 2+ on the left side. Dorsalis pedis pulses are 2+ on the left side. Posterior tibial pulses are 2+ on the left side. Pulmonary:      Effort: Pulmonary effort is normal. No respiratory distress. Abdominal:      Palpations: Abdomen is soft. Tenderness: There is no abdominal tenderness. Musculoskeletal:      Cervical back: Full passive range of motion without pain. Right lower leg: Tenderness (distal aspect of stump) present. No swelling. No edema. Left lower leg: No swelling. No edema. Right Lower Extremity: Right leg is amputated below knee. Feet:      Left foot:      Skin integrity: No ulcer or skin breakdown. Skin:     General: Skin is warm. Capillary Refill: Capillary refill takes less than 2 seconds. Neurological:      Mental Status: He is alert and oriented to person, place, and time. GCS: GCS eye subscore is 4. GCS verbal subscore is 5. GCS motor subscore is 6. Sensory: Sensation is intact. Motor: Motor function is intact.    Psychiatric:         Mood and Affect: Mood normal. Speech: Speech normal.         Behavior: Behavior normal.         Thought Content: Thought content normal.                 Small blistering due to prosthesis, incision and drainage performed noting necrotic fat no purulance    Imaging/Labs:             Neuroma noted on MRI  Assessment and Plan:     Chronic right BKA stump pain, neuroma  We discussed revision surgery given that end of tibia is likely root source of his discomfort and pain  I suspect I will only have to take it back 2-3 cm and be able to get good coverage from the posterior flap, I will try to assess the neuroma and consider excision as well  We discussed risks and benefits, although not guaranteed I am hopefull this should be able to get the source of his pain out, including infection need for further surgery or continued pain. He understands, all of his questions were answered and he consented to proceed with revision of his below knee amputation  Once he heals 2-3 weeks will have him get re-fitted for new prosthesis    Electronically signed by Garrett Rascon MD on 8/23/22 at 3:35 PM EDT      [address] [place] [address],[address]: [phone]  C: [phone]  Email: [email or web address] no ROM deficits were identified

## 2022-08-24 ENCOUNTER — OFFICE VISIT (OUTPATIENT)
Dept: PHYSICAL MEDICINE AND REHAB | Age: 67
End: 2022-08-24
Payer: COMMERCIAL

## 2022-08-24 VITALS
DIASTOLIC BLOOD PRESSURE: 70 MMHG | WEIGHT: 165 LBS | SYSTOLIC BLOOD PRESSURE: 120 MMHG | BODY MASS INDEX: 25.09 KG/M2 | TEMPERATURE: 98.7 F | HEART RATE: 51 BPM

## 2022-08-24 DIAGNOSIS — S88.111A TRAUMATIC BELOW-KNEE AMPUTATION OF RIGHT LOWER EXTREMITY WITH COMPLICATION, INITIAL ENCOUNTER (HCC): Primary | ICD-10-CM

## 2022-08-24 DIAGNOSIS — D36.13 NEUROMA OF RIGHT LOWER EXTREMITY: ICD-10-CM

## 2022-08-24 PROCEDURE — 99214 OFFICE O/P EST MOD 30 MIN: CPT | Performed by: STUDENT IN AN ORGANIZED HEALTH CARE EDUCATION/TRAINING PROGRAM

## 2022-08-24 PROCEDURE — 1123F ACP DISCUSS/DSCN MKR DOCD: CPT | Performed by: STUDENT IN AN ORGANIZED HEALTH CARE EDUCATION/TRAINING PROGRAM

## 2022-08-24 RX ORDER — NORTRIPTYLINE HYDROCHLORIDE 10 MG/1
10 CAPSULE ORAL NIGHTLY
Qty: 30 CAPSULE | Refills: 1 | Status: ON HOLD | OUTPATIENT
Start: 2022-08-24 | End: 2022-08-31 | Stop reason: HOSPADM

## 2022-08-26 PROBLEM — T87.9 BKA STUMP COMPLICATION (HCC): Status: ACTIVE | Noted: 2022-08-26

## 2022-08-26 ASSESSMENT — ENCOUNTER SYMPTOMS
CHEST TIGHTNESS: 0
ABDOMINAL PAIN: 0
COLOR CHANGE: 0
ALLERGIC/IMMUNOLOGIC NEGATIVE: 1
SHORTNESS OF BREATH: 0

## 2022-08-31 ENCOUNTER — ANESTHESIA EVENT (OUTPATIENT)
Dept: OPERATING ROOM | Age: 67
End: 2022-08-31
Payer: COMMERCIAL

## 2022-08-31 ENCOUNTER — HOSPITAL ENCOUNTER (OUTPATIENT)
Age: 67
Setting detail: OUTPATIENT SURGERY
Discharge: HOME | End: 2022-08-31
Attending: SURGERY | Admitting: SURGERY
Payer: COMMERCIAL

## 2022-08-31 ENCOUNTER — ANESTHESIA (OUTPATIENT)
Dept: OPERATING ROOM | Age: 67
End: 2022-08-31
Payer: COMMERCIAL

## 2022-08-31 VITALS
RESPIRATION RATE: 15 BRPM | SYSTOLIC BLOOD PRESSURE: 156 MMHG | DIASTOLIC BLOOD PRESSURE: 87 MMHG | OXYGEN SATURATION: 99 % | TEMPERATURE: 97.1 F | HEART RATE: 72 BPM

## 2022-08-31 DIAGNOSIS — M79.604 PAIN OF RIGHT LOWER EXTREMITY: Primary | ICD-10-CM

## 2022-08-31 DIAGNOSIS — Z89.511 S/P BELOW KNEE AMPUTATION, RIGHT (HCC): ICD-10-CM

## 2022-08-31 DIAGNOSIS — I96 GANGRENE (HCC): ICD-10-CM

## 2022-08-31 LAB
EKG ATRIAL RATE: 53 BPM
EKG P AXIS: -19 DEGREES
EKG P-R INTERVAL: 178 MS
EKG Q-T INTERVAL: 444 MS
EKG QRS DURATION: 108 MS
EKG QTC CALCULATION (BAZETT): 416 MS
EKG R AXIS: 0 DEGREES
EKG T AXIS: 19 DEGREES
EKG VENTRICULAR RATE: 53 BPM
GFR NON-AFRICAN AMERICAN: >60 ML/MIN
GFR SERPL CREATININE-BSD FRML MDRD: >60 ML/MIN
GFR SERPL CREATININE-BSD FRML MDRD: NORMAL ML/MIN/{1.73_M2}
GLUCOSE BLD-MCNC: 79 MG/DL (ref 74–100)
POC BUN: 15 MG/DL (ref 8–26)
POC CHLORIDE: 104 MMOL/L (ref 98–107)
POC CREATININE: 0.74 MG/DL (ref 0.51–1.19)
POC HEMATOCRIT: 44 % (ref 41–53)
POC HEMOGLOBIN: 15 G/DL (ref 13.5–17.5)
POC IONIZED CALCIUM: 1.23 MMOL/L (ref 1.15–1.33)
POC POTASSIUM: 4 MMOL/L (ref 3.5–4.5)
POC SODIUM: 143 MMOL/L (ref 138–146)

## 2022-08-31 PROCEDURE — 3600000003 HC SURGERY LEVEL 3 BASE: Performed by: SURGERY

## 2022-08-31 PROCEDURE — 82565 ASSAY OF CREATININE: CPT

## 2022-08-31 PROCEDURE — 7100000001 HC PACU RECOVERY - ADDTL 15 MIN

## 2022-08-31 PROCEDURE — 82947 ASSAY GLUCOSE BLOOD QUANT: CPT

## 2022-08-31 PROCEDURE — 27886 AMPUTATION FOLLOW-UP SURGERY: CPT | Performed by: SURGERY

## 2022-08-31 PROCEDURE — 2580000003 HC RX 258: Performed by: SURGERY

## 2022-08-31 PROCEDURE — 84520 ASSAY OF UREA NITROGEN: CPT

## 2022-08-31 PROCEDURE — 7100000001 HC PACU RECOVERY - ADDTL 15 MIN: Performed by: SURGERY

## 2022-08-31 PROCEDURE — 2709999900 HC NON-CHARGEABLE SUPPLY: Performed by: SURGERY

## 2022-08-31 PROCEDURE — 84295 ASSAY OF SERUM SODIUM: CPT

## 2022-08-31 PROCEDURE — 7100000010 HC PHASE II RECOVERY - FIRST 15 MIN: Performed by: SURGERY

## 2022-08-31 PROCEDURE — 3700000001 HC ADD 15 MINUTES (ANESTHESIA): Performed by: SURGERY

## 2022-08-31 PROCEDURE — 7100000000 HC PACU RECOVERY - FIRST 15 MIN

## 2022-08-31 PROCEDURE — 88304 TISSUE EXAM BY PATHOLOGIST: CPT

## 2022-08-31 PROCEDURE — 93005 ELECTROCARDIOGRAM TRACING: CPT | Performed by: SURGERY

## 2022-08-31 PROCEDURE — 2580000003 HC RX 258: Performed by: NURSE ANESTHETIST, CERTIFIED REGISTERED

## 2022-08-31 PROCEDURE — 84132 ASSAY OF SERUM POTASSIUM: CPT

## 2022-08-31 PROCEDURE — 88311 DECALCIFY TISSUE: CPT

## 2022-08-31 PROCEDURE — 85014 HEMATOCRIT: CPT

## 2022-08-31 PROCEDURE — 3600000013 HC SURGERY LEVEL 3 ADDTL 15MIN: Performed by: SURGERY

## 2022-08-31 PROCEDURE — 6370000000 HC RX 637 (ALT 250 FOR IP): Performed by: SURGERY

## 2022-08-31 PROCEDURE — 6370000000 HC RX 637 (ALT 250 FOR IP)

## 2022-08-31 PROCEDURE — 3700000000 HC ANESTHESIA ATTENDED CARE: Performed by: SURGERY

## 2022-08-31 PROCEDURE — 82435 ASSAY OF BLOOD CHLORIDE: CPT

## 2022-08-31 PROCEDURE — 2500000003 HC RX 250 WO HCPCS: Performed by: SURGERY

## 2022-08-31 PROCEDURE — 2500000003 HC RX 250 WO HCPCS: Performed by: NURSE ANESTHETIST, CERTIFIED REGISTERED

## 2022-08-31 PROCEDURE — 7100000000 HC PACU RECOVERY - FIRST 15 MIN: Performed by: SURGERY

## 2022-08-31 PROCEDURE — 82330 ASSAY OF CALCIUM: CPT

## 2022-08-31 PROCEDURE — 7100000011 HC PHASE II RECOVERY - ADDTL 15 MIN: Performed by: SURGERY

## 2022-08-31 PROCEDURE — 6360000002 HC RX W HCPCS: Performed by: NURSE ANESTHETIST, CERTIFIED REGISTERED

## 2022-08-31 RX ORDER — IPRATROPIUM BROMIDE AND ALBUTEROL SULFATE 2.5; .5 MG/3ML; MG/3ML
1 SOLUTION RESPIRATORY (INHALATION)
Status: CANCELLED | OUTPATIENT
Start: 2022-08-31 | End: 2022-08-31

## 2022-08-31 RX ORDER — LABETALOL HYDROCHLORIDE 5 MG/ML
INJECTION, SOLUTION INTRAVENOUS
Status: DISCONTINUED
Start: 2022-08-31 | End: 2022-08-31 | Stop reason: HOSPADM

## 2022-08-31 RX ORDER — BUPIVACAINE HYDROCHLORIDE 2.5 MG/ML
INJECTION, SOLUTION EPIDURAL; INFILTRATION; INTRACAUDAL
Status: DISCONTINUED
Start: 2022-08-31 | End: 2022-08-31 | Stop reason: HOSPADM

## 2022-08-31 RX ORDER — MIDAZOLAM HYDROCHLORIDE 2 MG/2ML
2 INJECTION, SOLUTION INTRAMUSCULAR; INTRAVENOUS
Status: CANCELLED | OUTPATIENT
Start: 2022-08-31 | End: 2022-08-31

## 2022-08-31 RX ORDER — SODIUM CHLORIDE 9 MG/ML
INJECTION, SOLUTION INTRAVENOUS PRN
Status: CANCELLED | OUTPATIENT
Start: 2022-08-31

## 2022-08-31 RX ORDER — OXYCODONE HYDROCHLORIDE AND ACETAMINOPHEN 5; 325 MG/1; MG/1
1 TABLET ORAL EVERY 6 HOURS PRN
Qty: 28 TABLET | Refills: 0 | OUTPATIENT
Start: 2022-08-31 | End: 2022-08-31 | Stop reason: SDUPTHER

## 2022-08-31 RX ORDER — DROPERIDOL 2.5 MG/ML
0.62 INJECTION, SOLUTION INTRAMUSCULAR; INTRAVENOUS
Status: CANCELLED | OUTPATIENT
Start: 2022-08-31 | End: 2022-08-31

## 2022-08-31 RX ORDER — PROPOFOL 10 MG/ML
INJECTION, EMULSION INTRAVENOUS PRN
Status: DISCONTINUED | OUTPATIENT
Start: 2022-08-31 | End: 2022-08-31 | Stop reason: SDUPTHER

## 2022-08-31 RX ORDER — SODIUM CHLORIDE 0.9 % (FLUSH) 0.9 %
5-40 SYRINGE (ML) INJECTION PRN
Status: CANCELLED | OUTPATIENT
Start: 2022-08-31

## 2022-08-31 RX ORDER — SODIUM CHLORIDE, SODIUM LACTATE, POTASSIUM CHLORIDE, CALCIUM CHLORIDE 600; 310; 30; 20 MG/100ML; MG/100ML; MG/100ML; MG/100ML
INJECTION, SOLUTION INTRAVENOUS CONTINUOUS PRN
Status: DISCONTINUED | OUTPATIENT
Start: 2022-08-31 | End: 2022-08-31 | Stop reason: SDUPTHER

## 2022-08-31 RX ORDER — SODIUM CHLORIDE 0.9 % (FLUSH) 0.9 %
5-40 SYRINGE (ML) INJECTION EVERY 12 HOURS SCHEDULED
Status: CANCELLED | OUTPATIENT
Start: 2022-08-31

## 2022-08-31 RX ORDER — OXYCODONE HYDROCHLORIDE AND ACETAMINOPHEN 5; 325 MG/1; MG/1
1 TABLET ORAL EVERY 4 HOURS PRN
Status: COMPLETED | OUTPATIENT
Start: 2022-08-31 | End: 2022-08-31

## 2022-08-31 RX ORDER — LABETALOL HYDROCHLORIDE 5 MG/ML
10 INJECTION, SOLUTION INTRAVENOUS
Status: CANCELLED | OUTPATIENT
Start: 2022-08-31

## 2022-08-31 RX ORDER — LIDOCAINE HYDROCHLORIDE 10 MG/ML
INJECTION, SOLUTION EPIDURAL; INFILTRATION; INTRACAUDAL; PERINEURAL PRN
Status: DISCONTINUED | OUTPATIENT
Start: 2022-08-31 | End: 2022-08-31 | Stop reason: SDUPTHER

## 2022-08-31 RX ORDER — MAGNESIUM HYDROXIDE 1200 MG/15ML
LIQUID ORAL CONTINUOUS PRN
Status: COMPLETED | OUTPATIENT
Start: 2022-08-31 | End: 2022-08-31

## 2022-08-31 RX ORDER — OXYCODONE HYDROCHLORIDE AND ACETAMINOPHEN 5; 325 MG/1; MG/1
1 TABLET ORAL EVERY 6 HOURS PRN
Qty: 28 TABLET | Refills: 0 | Status: SHIPPED | OUTPATIENT
Start: 2022-08-31 | End: 2022-09-07

## 2022-08-31 RX ORDER — BUPIVACAINE HYDROCHLORIDE 2.5 MG/ML
INJECTION, SOLUTION INFILTRATION; PERINEURAL PRN
Status: DISCONTINUED | OUTPATIENT
Start: 2022-08-31 | End: 2022-08-31 | Stop reason: ALTCHOICE

## 2022-08-31 RX ORDER — KETOROLAC TROMETHAMINE 30 MG/ML
INJECTION, SOLUTION INTRAMUSCULAR; INTRAVENOUS PRN
Status: DISCONTINUED | OUTPATIENT
Start: 2022-08-31 | End: 2022-08-31 | Stop reason: SDUPTHER

## 2022-08-31 RX ORDER — FENTANYL CITRATE 50 UG/ML
25 INJECTION, SOLUTION INTRAMUSCULAR; INTRAVENOUS EVERY 5 MIN PRN
Status: CANCELLED | OUTPATIENT
Start: 2022-08-31

## 2022-08-31 RX ORDER — ONDANSETRON 2 MG/ML
INJECTION INTRAMUSCULAR; INTRAVENOUS PRN
Status: DISCONTINUED | OUTPATIENT
Start: 2022-08-31 | End: 2022-08-31 | Stop reason: SDUPTHER

## 2022-08-31 RX ORDER — DIPHENHYDRAMINE HYDROCHLORIDE 50 MG/ML
12.5 INJECTION INTRAMUSCULAR; INTRAVENOUS
Status: CANCELLED | OUTPATIENT
Start: 2022-08-31 | End: 2022-08-31

## 2022-08-31 RX ORDER — CEFAZOLIN SODIUM 1 G/50ML
INJECTION, SOLUTION INTRAVENOUS PRN
Status: DISCONTINUED | OUTPATIENT
Start: 2022-08-31 | End: 2022-08-31 | Stop reason: SDUPTHER

## 2022-08-31 RX ORDER — FENTANYL CITRATE 50 UG/ML
INJECTION, SOLUTION INTRAMUSCULAR; INTRAVENOUS PRN
Status: DISCONTINUED | OUTPATIENT
Start: 2022-08-31 | End: 2022-08-31 | Stop reason: SDUPTHER

## 2022-08-31 RX ADMIN — ONDANSETRON 4 MG: 2 INJECTION INTRAMUSCULAR; INTRAVENOUS at 10:14

## 2022-08-31 RX ADMIN — LIDOCAINE HYDROCHLORIDE 50 MG: 10 INJECTION, SOLUTION EPIDURAL; INFILTRATION; INTRACAUDAL; PERINEURAL at 09:26

## 2022-08-31 RX ADMIN — SODIUM CHLORIDE, POTASSIUM CHLORIDE, SODIUM LACTATE AND CALCIUM CHLORIDE: 600; 310; 30; 20 INJECTION, SOLUTION INTRAVENOUS at 09:41

## 2022-08-31 RX ADMIN — OXYCODONE HYDROCHLORIDE AND ACETAMINOPHEN 1 TABLET: 5; 325 TABLET ORAL at 12:51

## 2022-08-31 RX ADMIN — FENTANYL CITRATE 100 MCG: 50 INJECTION, SOLUTION INTRAMUSCULAR; INTRAVENOUS at 09:26

## 2022-08-31 RX ADMIN — SODIUM CHLORIDE, POTASSIUM CHLORIDE, SODIUM LACTATE AND CALCIUM CHLORIDE: 600; 310; 30; 20 INJECTION, SOLUTION INTRAVENOUS at 09:27

## 2022-08-31 RX ADMIN — PROPOFOL 200 MG: 10 INJECTION, EMULSION INTRAVENOUS at 09:33

## 2022-08-31 RX ADMIN — CEFAZOLIN SODIUM 2 G: 1 INJECTION, SOLUTION INTRAVENOUS at 09:24

## 2022-08-31 RX ADMIN — KETOROLAC TROMETHAMINE 30 MG: 30 INJECTION, SOLUTION INTRAMUSCULAR; INTRAVENOUS at 10:16

## 2022-08-31 ASSESSMENT — PAIN SCALES - GENERAL: PAINLEVEL_OUTOF10: 4

## 2022-08-31 NOTE — OP NOTE
Operative Note      Patient: Liam Smith  YOB: 1955  MRN: [id number]    Date of Procedure: 8/31/2022    Pre-Op Diagnosis: Right below knee amputation pain, osteomyelitis, neuroma    Post-Op Diagnosis: Same       Procedure(s):  RIGHT BELOW KNEE AMPUTATION REVISION    Surgeon(s): Jocelyn Jacques MD    Assistant: Dr. Justina Solano    Anesthesia: General    Estimated Blood Loss (mL): 34 ml    Complications: None    Specimens:   ID Type Source Tests Collected by Time Destination   A : RIGHT BELOW KNEE STUMP BONE Tissue Tissue SURGICAL PATHOLOGY Jocelyn Jacques MD 8/31/2022 1592           Implants:  none      Drains: none    Findings: Osteophytic growth at distal end of tibia, transected at healthy segment. Detailed Description of Procedure:     Mr. Mackenzie Aguirre was brought to the operating room for right below knee amputation revision secondary to chronic pain at distal end of his stump and failure of non-operative therapies. .  After appropriate anesthesia delivered, the right lower extremity was prepped and draped in standard sterile fashion. Timeout performed and agreed upon, antibiotics given during this period. I began by marking out the purposed incision over the anterior aspect of the distal stump where the tibia was healthy. Incision made and cautery utilized to get through the subcutaneous tissue down to the scarred muscular layer and periosteum. The periosteum was elevated back circumferentially along the distal end of the stump, taking it back using cautery to healthy segment of the tibia. The tibia was then transected at this level with a oscillating power saw. The anterior aspect was also trimmed back and smooth over using the saw and the rasp. The wound bed was irrigated and dried, overall hemostatic after controlling spot areas of bleeding with cautery.   I explored the area in between the tibia and fibula to look for the neuroma, there was not much inflammation and mostly atrophied muscle. Decision made not to excise and dissect further given likely source of his pain being the distal tibia stump. Wound bed irrigated and dried again, the periosteum and muscular layer closed with interrupted 0 and 2-0 vicryl. Skin approximated with running monocryl. Steristrip and sterile dressings applied along with light compressive wrap. Patient tolerated procedure well and was brought to the recovery room.     Electronically signed by Kris Rosen MD on 8/31/2022 at 10:37 AM

## 2022-08-31 NOTE — DISCHARGE INSTRUCTIONS
Remove wrap and dressings in 1-2 days    Keep steristrip tape over incision site for 4-5 days and then peel off    Ok to shower after 1-2 days with dressings off    Continue to wear stump /sock to help with swelling, if you need a new one please contact Gerard to get you one    Follow up in 3 weeks, office will call to schedule    Tylenol for mild pain    Percocet for severe pain    Call if there are any problems

## 2022-08-31 NOTE — H&P
Division of Vascular Surgery        H&P Update    Patient's Office Note from 8/23/22 was reviewed. There are no changes today. Plan to proceed with right below knee amputation revision. Electronically signed by Garrett Rascon MD on 8/31/22 at 8:24 AM EDT      [address] [facility],[address]: [phone]  C: [phone]  Email: [email or web address]           Chief Complaint:       Pain over BKA stump     History of Present Illness:      Viraj Morales is a 74 y.o. gentleman who presents for follow up regarding chronic pain over his BKA stump. He continues to have significant pain at the end of his stump, he bears through it so he can wear his prosthesis. He has been using multiple liners and is waiting to get new prosthesis because of considering revision surgery. He did get neuroma injected with no relief. Pain is right at the end of his tibia. He recalls having revision of his BKA [facility] ago at [facility] due to bone spur that poked right out through his skin. He has been trying his best but its getting harder and harder to get around with his prosthesis due to pain and discomfort from his stump. His original amputation was in 1978, he was involved in an accident at work and a steel bar landed on it. He had a neuroma excised in 1980 due to severe pain, but seems like he is having similar pains now. He has had his prosthesis adjusted several times, but has not had any adjustments recently.   He using several liners to get the end of his stump some cushion     Medical History:      Past Medical History        Past Medical History:   Diagnosis Date    Arthritis      Chronic right shoulder pain      Chronic shoulder pain, left 2001     had surgery to repair    Environmental allergies      History of general anesthesia complication 4/92/3519     airway closed off after intubation after cervical neck surgery, was remedied with a counteracting medication    HLD (hyperlipidemia)      Hypertension      DARYL on CPAP 4/22/2015     Dr. Babar Lopez.  Auto, 12-16cm of H20    Respiratory failure, post-operative ([facility] [address] 5/16/2001     due to airway swelling obstruction after intubation due to certain medication    Rotator cuff tear, left 2014     Dr. Nathalia Aguirre cuff tear, right 4/27/2015     Dr. Jamar Gilbert,             Surgical History:      Past Surgical History         Past Surgical History:   Procedure Laterality Date    AMPUTATION Right       right below knee amputation, due to industrial accident   [address] Left January 2013    CARPAL TUNNEL RELEASE Right 08/03/2016    CERVICAL FUSION         C-spine fusion times 3 levels    CERVICAL LAMINECTOMY   January 2013     C 4-5-6-7    COLONOSCOPY   04/07/2006    COLONOSCOPY N/A 10/24/2018     COLONOSCOPY WITH BIOPSY and snare polypectomy performed by Arlene Cerda MD at [place] [address] HAND SURGERY Left 12/15/2016     Reconstuctive Joint    HAND TENDON SURGERY         repair    HEMORRHOID SURGERY        KNEE SURGERY Bilateral       removed bursa and trimmed cartilage    ROTATOR CUFF REPAIR Left 3/2014     Dr. Jamar Gilbert, open repair    ROTATOR CUFF REPAIR Right 4/27/15    SPINE SURGERY         see cervical laminectomy    TUMOR REMOVAL Left 2015     Dr. Jhonny Garvin begin tumor removed index finger            Family History:      Family History         Family History   Problem Relation Age of Onset    Cancer Mother           breast with mets to brain, bone, lymph    Parkinsonism Father      Arthritis Father      Alzheimer's Disease Father      Heart Disease Maternal Grandmother      Heart Disease Maternal Grandfather              Allergies:       Cat hair extract, Dog epithelium, and Other     Medications:      Current Facility-Administered Medications          Current Outpatient Medications   Medication Sig Dispense Refill    colchicine (COLCRYS) 0.6 MG tablet Take 1 tablet by mouth in the morning. 30 tablet 2    metoprolol succinate (TOPROL XL) 25 MG extended release tablet take 1 tablet by mouth once daily 30 tablet 2    nortriptyline (PAMELOR) 10 MG capsule Take 10mg nightly for 1 week, followed by 20mg nightly ongoing 60 capsule 0    gabapentin (NEURONTIN) 300 MG capsule Take 3 capsules by mouth in the morning and 3 capsules at noon and 3 capsules before bedtime. Do all this for 90 days. 270 capsule 2    atorvastatin (LIPITOR) 40 MG tablet take 1 tablet by mouth every evening 90 tablet 1    Diclofenac Sodium 3 % CREA Apply topically        colchicine (COLCRYS) 0.6 MG tablet take 1 tablet by mouth once daily 30 tablet 3    ibuprofen (ADVIL;MOTRIN) 200 MG tablet Take 200 mg by mouth every 6 hours as needed for Pain #1-2 PO QD PRN        sildenafil (VIAGRA) 100 MG tablet take 1 tablet by mouth if needed for ERECTILE DYSFUNCTION 30 tablet 0    aspirin 325 MG tablet Take 325 mg by mouth daily         UREA 10 HYDRATING 10 % cream apply topically as directed if needed for DRY SKIN (CALLUS) DAILY        fexofenadine (ALLEGRA) 180 MG tablet Take 180 mg by mouth         fluticasone (FLONASE) 50 MCG/ACT nasal spray 1 spray by Nasal route daily 1 Bottle 2                Current Facility-Administered Medications   Medication Dose Route Frequency Provider Last Rate Last Admin    albuterol (PROVENTIL) nebulizer solution 2.5 mg  2.5 mg Nebulization Once Michelle López MD        ipratropium (ATROVENT) 0.02 % nebulizer solution 0.5 mg  0.5 mg Nebulization Once Michelle López MD                    Facility-Administered Medications Ordered in Other Visits   Medication Dose Route Frequency Provider Last Rate Last Admin    iothalamate (CONRAY) 43 % injection 15 mL  15 mL IntraVENous ONCE PRN Estefania Crespo MD             Social History:      Tobacco:    reports that he has never smoked.  He has never used smokeless tobacco.  Alcohol:      reports current alcohol use of about 1.0 standard drink per week. Drug Use:  reports no history of drug use. Occupation:  Retired  for [employer], [employer], [employer] structural steel     Review of Systems:      Review of Systems   Constitutional:  Negative for chills and fever. HENT:  Negative for congestion. Eyes:  Negative for visual disturbance. Respiratory:  Negative for chest tightness and shortness of breath. Cardiovascular:  Negative for chest pain and leg swelling. Gastrointestinal:  Negative for abdominal pain. Endocrine: Negative. Genitourinary: Negative. Musculoskeletal:  Positive for arthralgias. Skin:  Negative for color change and wound. Allergic/Immunologic: Negative. Neurological:  Negative for facial asymmetry, speech difficulty, weakness and numbness. Hematological: Negative. Psychiatric/Behavioral: Negative. Physical Exam:      Vitals:  /73 (Site: Right Upper Arm, Position: Sitting, Cuff Size: Medium Adult)   Pulse 60   Temp 96.8 °F (36 °C) (Temporal)   Resp 17   Ht 5' 8\" (1.727 m)   Wt 165 lb (74.8 kg)   SpO2 96%   BMI 25.09 kg/m²      Physical Exam  Constitutional:       Appearance: He is well-developed and well-groomed. Eyes:      Extraocular Movements: Extraocular movements intact. Conjunctiva/sclera: Conjunctivae normal.   Neck:      Vascular: No carotid bruit. Cardiovascular:      Rate and Rhythm: Normal rate and regular rhythm. Pulses:           Radial pulses are 2+ on the right side and 2+ on the left side. Dorsalis pedis pulses are 2+ on the left side. Posterior tibial pulses are 2+ on the left side. Pulmonary:      Effort: Pulmonary effort is normal. No respiratory distress. Abdominal:      Palpations: Abdomen is soft. Tenderness: There is no abdominal tenderness. Musculoskeletal:      Cervical back: Full passive range of motion without pain.       Right lower leg: Tenderness (distal aspect of stump) present. No swelling. No edema. Left lower leg: No swelling. No edema. Right Lower Extremity: Right leg is amputated below knee. Feet:      Left foot:      Skin integrity: No ulcer or skin breakdown. Skin:     General: Skin is warm. Capillary Refill: Capillary refill takes less than 2 seconds. Neurological:      Mental Status: He is alert and oriented to person, place, and time. GCS: GCS eye subscore is 4. GCS verbal subscore is 5. GCS motor subscore is 6. Sensory: Sensation is intact. Motor: Motor function is intact. Psychiatric:         Mood and Affect: Mood normal.         Speech: Speech normal.         Behavior: Behavior normal.         Thought Content: Thought content normal.               Small blistering due to prosthesis, incision and drainage performed noting necrotic fat no purulance     Imaging/Labs:                 Neuroma noted on MRI  Assessment and Plan:      Chronic right BKA stump pain, neuroma  We discussed revision surgery given that end of tibia is likely root source of his discomfort and pain  I suspect I will only have to take it back 2-3 cm and be able to get good coverage from the posterior flap, I will try to assess the neuroma and consider excision as well  We discussed risks and benefits, although not guaranteed I am hopefull this should be able to get the source of his pain out, including infection need for further surgery or continued pain.   He understands, all of his questions were answered and he consented to proceed with revision of his below knee amputation  Once he heals 2-3 weeks will have him get re-fitted for new prosthesis

## 2022-08-31 NOTE — ANESTHESIA PRE PROCEDURE
Department of Anesthesiology  Preprocedure Note       Name:  Isaac Rubalcava   Age:  74 y.o.  :  1955                                          MRN:  [id number]         Date:  2022      Surgeon: Lonna Frankel): William Thibodeaux MD    Procedure: Procedure(s):  REVISION OF BELOW KNEE AMPUTATION    Medications prior to admission:   Prior to Admission medications    Medication Sig Start Date End Date Taking? Authorizing Provider   nortriptyline (PAMELOR) 10 MG capsule Take 1 capsule by mouth nightly 22   Samy Alex MD   colchicine (COLCRYS) 0.6 MG tablet Take 1 tablet by mouth in the morning. 8/15/22   Mamadou Giraldo MD   metoprolol succinate (TOPROL XL) 25 MG extended release tablet take 1 tablet by mouth once daily 8/15/22   Mamadou Giraldo MD   gabapentin (NEURONTIN) 300 MG capsule Take 3 capsules by mouth in the morning and 3 capsules at noon and 3 capsules before bedtime. Do all this for 90 days.  7/25/22 10/23/22  Samy Alex MD   atorvastatin (LIPITOR) 40 MG tablet take 1 tablet by mouth every evening 6/15/22   Mamadou Giraldo MD   Diclofenac Sodium 3 % CREA Apply topically    Historical Provider, MD   colchicine (COLCRYS) 0.6 MG tablet take 1 tablet by mouth once daily 21   Mamadou Giraldo MD   ibuprofen (ADVIL;MOTRIN) 200 MG tablet Take 200 mg by mouth every 6 hours as needed for Pain #1-2 PO QD PRN    Historical Provider, MD   sildenafil (VIAGRA) 100 MG tablet take 1 tablet by mouth if needed for ERECTILE DYSFUNCTION 21   Mamadou Giraldo MD   aspirin 325 MG tablet Take 325 mg by mouth daily     Historical Provider, MD   UREA 10 HYDRATING 10 % cream apply topically as directed if needed for DRY SKIN (CALLUS) DAILY 21   Historical Provider, MD   fexofenadine (ALLEGRA) 180 MG tablet Take 180 mg by mouth     Historical Provider, MD   fluticasone [facility]) 50 MCG/ACT nasal spray 1 spray by Nasal route daily 18  Chet Farmer ISIDRO       Current medications:    No current facility-administered medications for this encounter. Facility-Administered Medications Ordered in Other Encounters   Medication Dose Route Frequency Provider Last Rate Last Admin    iothalamate (CONRAY) 43 % injection 15 mL  15 mL IntraVENous ONCE PRN Rema Jalloh MD           Allergies: Allergies   Allergen Reactions    Cat Hair Extract     Dog Epithelium     Other Rash     MD thinks he is allergic to something in the environment. Problem List:    Patient Active Problem List   Diagnosis Code    HTN (hypertension) I10    Left cervical radiculopathy M54.12    Cervicalgia M54.2    Vertebral arthropathy M47.819    Traumatic amputation of right leg below knee with complication ([address] E06.722V    Employs prosthetic leg Z97.10    Allergy to dogs J30.81    Sleep apnea G47.30    Gout of big toe M10.9    Chest pain R07.9    Acquired trigger finger M65.30    Arthritis of hand M19.049    Bilateral sensorineural hearing loss H90.3    Bilateral tinnitus H93.13    Carpal tunnel syndrome G56.00    Cyst of finger [id number]    History of vertigo Z87.898    Non-pressure chronic ulcer of calf with fat layer exposed, right ([address] L97.212    Callus L84    S/P below knee amputation, right ([address] Z89.511    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 Trujillo [facility].  Auto, 12-16cm of H20    Respiratory failure, post-operative ([address] 5/16/2001    due to airway swelling obstruction after intubation due to certain medication    Rotator cuff tear, left 2014    Dr. Oron[place] Sport cuff tear, right 4/27/2015    Dr. Lisette Alegre,        Past Surgical History:        Procedure Laterality Date    AMPUTATION Right     right below knee amputation, due to industrial accident   [address] Left January 2013    CARPAL TUNNEL RELEASE Right 08/03/2016    CERVICAL FUSION      C-spine fusion times 3 levels    CERVICAL LAMINECTOMY  January 2013    C 4-5-6-7    COLONOSCOPY  04/07/2006    COLONOSCOPY N/A 10/24/2018    COLONOSCOPY WITH BIOPSY and snare polypectomy performed by Masni Arriola MD at [address] [place] [address] HAND SURGERY Left 12/15/2016    Reconstuctive Joint    HAND TENDON SURGERY      repair    HEMORRHOID SURGERY      KNEE SURGERY Bilateral     removed bursa and trimmed cartilage    ROTATOR CUFF REPAIR Left 3/2014    Dr. Lisette Alegre, open repair    ROTATOR CUFF REPAIR Right 4/27/15    SPINE SURGERY      see cervical laminectomy    TUMOR REMOVAL Left 2015    Dr. Lisette Dodson begin tumor removed index finger       Social History:    Social History     Tobacco Use    Smoking status: Never    Smokeless tobacco: Never   Substance Use Topics    Alcohol use: Yes     Alcohol/week: 1.0 standard drink     Types: 1 Glasses of wine per week     Comment: 1 glass of wine every 3 days                                Counseling given: Not Answered      Vital Signs (Current): There were no vitals filed for this visit.                                            BP Readings from Last 3 Encounters:   08/24/22 120/70   08/23/22 122/73   07/25/22 (!) 149/72       NPO Status:                                                                                 BMI:   Wt Readings from Last 3 Encounters:   08/24/22 165 lb (74.8 kg)   08/23/22 165 lb (74.8 kg)   07/25/22 168 lb (76.2 kg)     There is no height or weight on file to calculate BMI.    CBC:   Lab Results   Component Value Date/Time    WBC 8.1 02/24/2019 04:56 AM    RBC 4.45 02/24/2019 04:56 AM    HGB 14.5 02/24/2019 04:56 AM    HCT 42.7 02/24/2019 04:56 AM    MCV 96.0 02/24/2019 04:56 AM    RDW 13.0 02/24/2019 04:56 AM     02/24/2019 04:56 AM       CMP:   Lab Results   Component Value Date/Time     02/24/2019 04:56 AM    K 3.8 02/24/2019 04:56 AM    CL 96 02/24/2019 04:56 AM    CO2 24 02/24/2019 04:56 AM    BUN 18 09/14/2021 11:24 AM    CREATININE 0.68 09/14/2021 11:24 AM    GFRAA >60 09/14/2021 11:24 AM    LABGLOM >60 09/14/2021 11:24 AM    GLUCOSE 110 02/24/2019 04:56 AM    PROT 7.4 02/24/2019 04:56 AM    CALCIUM 10.1 02/24/2019 04:56 AM    BILITOT 0.33 02/24/2019 04:56 AM    ALKPHOS 75 02/24/2019 04:56 AM    AST 28 02/24/2019 04:56 AM    ALT 22 02/24/2019 04:56 AM       POC Tests: No results for input(s): POCGLU, POCNA, POCK, POCCL, POCBUN, POCHEMO, POCHCT in the last 72 hours.     Coags:   Lab Results   Component Value Date/Time    PROTIME 11.4 03/26/2015 10:43 AM    INR 1.1 03/26/2015 10:43 AM    APTT 26.6 03/26/2015 10:43 AM       HCG (If Applicable): No results found for: PREGTESTUR, PREGSERUM, HCG, HCGQUANT     ABGs: No results found for: PHART, PO2ART, SHS3PHK, WZR3AAU, BEART, G0GSOJOP     Type & Screen (If Applicable):  No results found for: LABABO, LABRH    Drug/Infectious Status (If Applicable):  Lab Results   Component Value Date/Time    HEPCAB NONREACTIVE 09/04/2018 11:10 AM       COVID-19 Screening (If Applicable): No results found for: COVID19        Anesthesia Evaluation  Patient summary reviewed and Nursing notes reviewed no history of anesthetic complications:   Airway: Mallampati: II          Dental: normal exam         Pulmonary:normal exam    (+) sleep apnea:                             Cardiovascular:  Exercise tolerance: poor (<4 METS),   (+) hypertension:,                   Neuro/Psych:   (+) neuromuscular disease:,             GI/Hepatic/Renal:             Endo/Other:                      ROS comment: Vertigo  gout Abdominal:             Vascular: Other Findings:           Anesthesia Plan      general     ASA 3       Induction: intravenous. MIPS: Postoperative opioids intended and Postoperative trial extubation. Anesthetic plan and risks discussed with patient. Use of blood products discussed with patient whom. Plan discussed with CRNA.                     Terrance Moise MD   8/31/2022

## 2022-09-01 LAB — SURGICAL PATHOLOGY REPORT: NORMAL

## 2022-09-06 NOTE — PROGRESS NOTES
[address] [facility],[address] PHYSICAL MEDICINE AND REHABILITATION  [address] [place] [address]  Dept: [phone]  Dept Fax: [phone]    Outpatient Followup Note    Vivienne Love is a 74y.o.-year-old male presenting for follow up of remote traumatic right below-knee amputation and neuroma. HPI:     He was last seen on 7/25/22 for follow up of right BKA and neuroma. Since that time, he reports that pain in the right residual limb is about the same. He states that nortriptyline has helped him to sleep but has not helped much with pain. He also notes a \"hangover\" effect in the morning with the nortriptyline. He is currently taking nortriptyline 20mg nightly. He remains on gabapentin 900mg TID at this time as well. He states that he followed up with Dr. Dali Raymond and plans to have revision of the right residual limb for the neuroma next week. He reports that he will proceed with casting of a new prosthetic socket after the procedure. Additionally, he states that Dr. Dali Raymond drained a small cyst on the right residual limb at the appointment. Past Medical History:   Diagnosis Date    Arthritis     Chronic right shoulder pain     Chronic shoulder pain, left 2001    had surgery to repair    Environmental allergies     History of general anesthesia complication 4/86/1387    airway closed off after intubation after cervical neck surgery, was remedied with a counteracting medication    HLD (hyperlipidemia)     Hypertension     DRAYL on CPAP 4/22/2015    Dr. Jose Louie.  Auto, 12-16cm of H20    Respiratory failure, post-operative ([facility] 5/16/2001    due to airway swelling obstruction after intubation due to certain medication    Rotator cuff tear, left 2014    Dr. Mleinda Rodriguez    Rotator cuff tear, right 4/27/2015    Dr. Melinda Rodriguez,       Past Surgical History:   Procedure Laterality Date    AMPUTATION Right     right below knee amputation, due to industrial accident CARPAL TUNNEL RELEASE Left January 2013    CARPAL TUNNEL RELEASE Right 08/03/2016    CERVICAL FUSION      C-spine fusion times 3 levels    CERVICAL LAMINECTOMY  January 2013    C 4-5-6-7    COLONOSCOPY  04/07/2006    COLONOSCOPY N/A 10/24/2018    COLONOSCOPY WITH BIOPSY and snare polypectomy performed by Trish Mohan MD at [address] Left 12/15/2016    Reconstuctive Joint    HAND TENDON SURGERY      repair    HEMORRHOID SURGERY      KNEE SURGERY Bilateral     removed bursa and trimmed cartilage    LEG AMPUTATION BELOW KNEE Right 8/31/2022    RIGHT BELOW KNEE AMPUTATION REVISION performed by Jocelyn Jacques MD at [facility] Left 3/2014    Dr. Bob Ortega, open repair    ROTATOR CUFF REPAIR Right 4/27/15    SPINE SURGERY      see cervical laminectomy    TUMOR REMOVAL Left 2015    Dr. Pratima Lock begin tumor removed index finger     Family History   Problem Relation Age of Onset    Cancer Mother         breast with mets to brain, bone, lymph    Parkinsonism Father     Arthritis Father     Alzheimer's Disease Father     Heart Disease Maternal Grandmother     Heart Disease Maternal Grandfather      Social History     Socioeconomic History    Marital status:      Spouse name: None    Number of children: None    Years of education: None    Highest education level: None   Occupational History    Occupation: contractor   Tobacco Use    Smoking status: Never    Smokeless tobacco: Never   Vaping Use    Vaping Use: Never used   Substance and Sexual Activity    Alcohol use: Yes     Alcohol/week: 1.0 standard drink     Types: 1 Glasses of wine per week     Comment: 1 glass of wine every 3 days    Drug use: No    Sexual activity: Yes     Partners: Female       Allergies   Allergen Reactions    Cat Hair Extract     Dog Epithelium     Other Rash     MD thinks he is allergic to something in the environment.        Current Outpatient Medications   Medication Sig Dispense Refill    colchicine (COLCRYS) 0.6 MG tablet Take 1 tablet by mouth in the morning. 30 tablet 2    metoprolol succinate (TOPROL XL) 25 MG extended release tablet take 1 tablet by mouth once daily 30 tablet 2    gabapentin (NEURONTIN) 300 MG capsule Take 3 capsules by mouth in the morning and 3 capsules at noon and 3 capsules before bedtime. Do all this for 90 days. 270 capsule 2    atorvastatin (LIPITOR) 40 MG tablet take 1 tablet by mouth every evening 90 tablet 1    Diclofenac Sodium 3 % CREA Apply topically      colchicine (COLCRYS) 0.6 MG tablet take 1 tablet by mouth once daily 30 tablet 3    ibuprofen (ADVIL;MOTRIN) 200 MG tablet Take 200 mg by mouth every 6 hours as needed for Pain #1-2 PO QD PRN      sildenafil (VIAGRA) 100 MG tablet take 1 tablet by mouth if needed for ERECTILE DYSFUNCTION 30 tablet 0    UREA 10 HYDRATING 10 % cream apply topically as directed if needed for DRY SKIN (CALLUS) DAILY      fexofenadine (ALLEGRA) 180 MG tablet Take 180 mg by mouth       fluticasone (FLONASE) 50 MCG/ACT nasal spray 1 spray by Nasal route daily 1 Bottle 2    oxyCODONE-acetaminophen (PERCOCET) 5-325 MG per tablet Take 1 tablet by mouth every 6 hours as needed for Pain for up to 7 days. Intended supply: 7 days.  Take lowest dose possible to manage pain 28 tablet 0     Current Facility-Administered Medications   Medication Dose Route Frequency Provider Last Rate Last Admin    albuterol (PROVENTIL) nebulizer solution 2.5 mg  2.5 mg Nebulization Once Mamadou Giraldo MD        ipratropium (ATROVENT) 0.02 % nebulizer solution 0.5 mg  0.5 mg Nebulization Once Mamadou Giraldo MD         Facility-Administered Medications Ordered in Other Visits   Medication Dose Route Frequency Provider Last Rate Last Admin    iothalamate (CONRAY) 43 % injection 15 mL  15 mL IntraVENous ONCE PRN Robbin Angeles MD           Subjective:      Review of Systems   Musculoskeletal:         +right residual limb pain   Skin:  Positive for wound. Objective:     Physical Exam  /70   Pulse 51   Temp 98.7 °F (37.1 °C)   Wt 165 lb (74.8 kg)   BMI 25.09 kg/m²     Constitutional: He appears well-developed and well-nourished. In no distress. HEENT: NCAT, EOM grossly intact. Hearing grossly intact. Pulmonary/Chest: Respirations WNL and unlabored. MSK:  Strength 5/5 in right residual limb. Neurological: He is alert and oriented to person, place, and time. Sensation to light touch intact in the right residual limb. Normal gait with right lower limb prosthesis but no assistive device. Skin: Skin is warm dry and intact with good turgor. Erythema present over bony areas on the right residual limb after removing prosthesis. Minimal tenderness to palpation of the small area of the cyst that was lanced; no erythema or drainage noted at the site. Psychiatric: He has a normal mood and affect. His behavior is normal. Thought content normal.    Nursing note and vitals reviewed. Reviewed notes from vascular surgery. Assessment:       Diagnosis Orders   1. Traumatic below-knee amputation of right lower extremity with complication, initial encounter ([facility]        2. Neuroma of right lower extremity             Plan:      - Provided prescription for decreased dose of nortriptyline 10mg nightly due to \"hangover\" effect with 20mg nightly. Discussed with the patient that he may discontinue this medication if he feels like he no longer needs it after surgery. - Continue gabapentin 900mg TID at this time, as this has helped a little bit with pain in the right residual limb.   Discussed with patient that he should not abruptly discontinue this medication - discussed that this medication can be weaned starting at the next follow up appointment in this clinic.  - Revision surgery scheduled with Dr. Melissa Roman on 8/31/22; follow up with him as directed  - He is a previously high-functioning individual; is highly motivated; and would like to continue ambulating and completing ADLs independently, gardening, visiting family and friends, doing home repairs, and working out. After review with the prosthetist, I agree this patient would benefit from a total contact acrylic socket with flexible insert, a flex walk system prosthetic foot with vacuum pump, two seal-in liners, and multi-ply prosthetic socks. He has the ability and potential to be a K3 ambulator - the patient has the ability or potential for ambulation with variable charisse typical of the community ambulator who has the ability to traverse most environmental barriers and may have vocational, therapeutic, or exercise activity that demands prosthetic utilization beyond simple locomotion. Orders Placed This Encounter   Medications    nortriptyline (PAMELOR) 10 MG capsule     Sig: Take 1 capsule by mouth nightly     Dispense:  30 capsule     Refill:  1       Return in about 6 weeks (around 10/5/2022).        Electronically signed by Blanca Cormier MD on 9/5/2022 at 10:25 PM.

## 2022-09-29 ENCOUNTER — OFFICE VISIT (OUTPATIENT)
Dept: VASCULAR SURGERY | Age: 67
End: 2022-09-29

## 2022-09-29 VITALS
RESPIRATION RATE: 17 BRPM | BODY MASS INDEX: 23.64 KG/M2 | SYSTOLIC BLOOD PRESSURE: 116 MMHG | WEIGHT: 156 LBS | DIASTOLIC BLOOD PRESSURE: 64 MMHG | HEIGHT: 68 IN | HEART RATE: 52 BPM | OXYGEN SATURATION: 94 % | TEMPERATURE: 97.3 F

## 2022-09-29 DIAGNOSIS — T87.9 BKA STUMP COMPLICATION (HCC): ICD-10-CM

## 2022-09-29 DIAGNOSIS — T87.33 NEUROMA OF AMPUTATION STUMP OF RIGHT LOWER EXTREMITY (HCC): ICD-10-CM

## 2022-09-29 DIAGNOSIS — Z89.511 S/P BELOW KNEE AMPUTATION, RIGHT (HCC): Primary | ICD-10-CM

## 2022-09-29 PROCEDURE — 99024 POSTOP FOLLOW-UP VISIT: CPT | Performed by: SURGERY

## 2022-09-29 RX ORDER — TADALAFIL 10 MG/1
TABLET ORAL
COMMUNITY
Start: 2022-09-01

## 2022-09-29 RX ORDER — OXYCODONE HYDROCHLORIDE AND ACETAMINOPHEN 5; 325 MG/1; MG/1
TABLET ORAL
COMMUNITY
Start: 2022-08-31

## 2022-09-29 NOTE — PROGRESS NOTES
Division of Vascular Surgery        Postoperative Follow Up    Right BKA revision (8/31/22)    History of Present Illness:      Mariaelena Joiner is a 74 y.o. gentleman presents for postoperative follow up after undergoing below knee amputation revision due to pain and osteophytic growth from his prior amputation from decades ago. He is doing well overall. Incision is healing, callus was removed. Has some mild discomfort and swelling. Review of Systems:     Review of Systems   Constitutional:  Negative for chills and fever. HENT:  Negative for congestion. Eyes:  Negative for visual disturbance. Respiratory:  Negative for chest tightness and shortness of breath. Cardiovascular:  Negative for chest pain and leg swelling. Gastrointestinal:  Negative for abdominal pain. Endocrine: Negative. Genitourinary: Negative. Musculoskeletal:  Positive for arthralgias. Skin:  Negative for color change and wound. Allergic/Immunologic: Negative. Neurological:  Negative for facial asymmetry, speech difficulty, weakness and numbness. Hematological: Negative. Psychiatric/Behavioral: Negative. Physical Exam:     Vitals:  /64 (Site: Left Upper Arm, Position: Sitting, Cuff Size: Medium Adult)   Pulse 52   Temp 97.3 °F (36.3 °C) (Temporal)   Resp 17   Ht 5' 8\" (1.727 m)   Wt 156 lb (70.8 kg)   SpO2 94%   BMI 23.72 kg/m²     Physical Exam  Constitutional:       Appearance: He is well-developed and well-groomed. Eyes:      Extraocular Movements: Extraocular movements intact. Conjunctiva/sclera: Conjunctivae normal.   Neck:      Vascular: No carotid bruit. Cardiovascular:      Rate and Rhythm: Normal rate and regular rhythm. Pulses:           Radial pulses are 2+ on the right side and 2+ on the left side. Dorsalis pedis pulses are 2+ on the left side. Posterior tibial pulses are 2+ on the left side.    Pulmonary:      Effort: Pulmonary effort is normal. No

## 2022-10-14 ENCOUNTER — TELEPHONE (OUTPATIENT)
Dept: PHYSICAL MEDICINE AND REHAB | Age: 67
End: 2022-10-14

## 2022-10-14 RX ORDER — GABAPENTIN 300 MG/1
900 CAPSULE ORAL 3 TIMES DAILY
Qty: 270 CAPSULE | Refills: 0 | Status: CANCELLED | OUTPATIENT
Start: 2022-10-14 | End: 2022-11-13

## 2022-10-14 NOTE — TELEPHONE ENCOUNTER
Pt called for refill of gabapentin. He states that the will run out today? He was last seen 08.24.22  Next fu is 10.19.22    I called pharmacy to verify when the rx was filled because when questioning patient he thought he was constant with medicatiion but stated that maybe he forgets to take a dose. The last refills were 06.26.22; 08.01.22; 08.28.22    Pharmacy stated that he had one refill remaining. I did tell them to go ahead and refill this since he is due for it per the last refill. I did tell patient that to get the full benefit from this type of medication he shoulder really try to remember to take as prescribed. He has appt to see dr. Mitchel Gallegos next week and will send a new rx of gabapentin to the pharmacy for next month refill at that time. Pt understood and will try to remember his medication.

## 2022-10-18 ASSESSMENT — ENCOUNTER SYMPTOMS
COLOR CHANGE: 0
ALLERGIC/IMMUNOLOGIC NEGATIVE: 1
ABDOMINAL PAIN: 0
SHORTNESS OF BREATH: 0
CHEST TIGHTNESS: 0

## 2022-10-19 ENCOUNTER — OFFICE VISIT (OUTPATIENT)
Dept: PHYSICAL MEDICINE AND REHAB | Age: 67
End: 2022-10-19
Payer: COMMERCIAL

## 2022-10-19 VITALS
SYSTOLIC BLOOD PRESSURE: 134 MMHG | HEIGHT: 68 IN | HEART RATE: 66 BPM | BODY MASS INDEX: 24.89 KG/M2 | DIASTOLIC BLOOD PRESSURE: 75 MMHG | TEMPERATURE: 98 F | WEIGHT: 164.2 LBS

## 2022-10-19 DIAGNOSIS — D36.13 NEUROMA OF RIGHT LOWER EXTREMITY: ICD-10-CM

## 2022-10-19 DIAGNOSIS — S88.111A TRAUMATIC BELOW-KNEE AMPUTATION OF RIGHT LOWER EXTREMITY WITH COMPLICATION, INITIAL ENCOUNTER (HCC): Primary | ICD-10-CM

## 2022-10-19 PROCEDURE — 99213 OFFICE O/P EST LOW 20 MIN: CPT | Performed by: STUDENT IN AN ORGANIZED HEALTH CARE EDUCATION/TRAINING PROGRAM

## 2022-10-19 PROCEDURE — 3074F SYST BP LT 130 MM HG: CPT | Performed by: STUDENT IN AN ORGANIZED HEALTH CARE EDUCATION/TRAINING PROGRAM

## 2022-10-19 PROCEDURE — 1123F ACP DISCUSS/DSCN MKR DOCD: CPT | Performed by: STUDENT IN AN ORGANIZED HEALTH CARE EDUCATION/TRAINING PROGRAM

## 2022-10-19 PROCEDURE — 3078F DIAST BP <80 MM HG: CPT | Performed by: STUDENT IN AN ORGANIZED HEALTH CARE EDUCATION/TRAINING PROGRAM

## 2022-11-03 ENCOUNTER — OFFICE VISIT (OUTPATIENT)
Dept: VASCULAR SURGERY | Age: 67
End: 2022-11-03

## 2022-11-03 VITALS
HEART RATE: 62 BPM | OXYGEN SATURATION: 98 % | HEIGHT: 68 IN | SYSTOLIC BLOOD PRESSURE: 116 MMHG | RESPIRATION RATE: 20 BRPM | DIASTOLIC BLOOD PRESSURE: 65 MMHG | WEIGHT: 163 LBS | BODY MASS INDEX: 24.71 KG/M2 | TEMPERATURE: 98.7 F

## 2022-11-03 DIAGNOSIS — Z89.511 S/P BELOW KNEE AMPUTATION, RIGHT (HCC): Primary | ICD-10-CM

## 2022-11-03 PROCEDURE — 99024 POSTOP FOLLOW-UP VISIT: CPT | Performed by: SURGERY

## 2022-11-03 NOTE — PROGRESS NOTES
Division of Vascular Surgery        Follow Up    Mr. Annie Chirinos comes in for a wound check after undergoing BKA revision. He is doing well, pain is improved. Gets nerve like pain intermittently. On Gabapentin 900 mg three times a day which helps. He is eager to get his new prosthesis, has appointment tomorrow to get sized up. He attempted deer hunting the other day and was too difficult using bow and arrow without his prosthesis. He is planning on going on a cruise during the winter holidays and then to [place] for a road trip afterwards. He will follow up after then, hopefully with his new leg and will see him then. November 2022 October 2022 August 2022           Electronically signed by Elaine Borja MD on 11/3/22 at 3:33 PM EDT      [address] [place] [address],[address] [facility]: [phone]  C: [phone]  Email: [email or web address]

## 2022-11-09 RX ORDER — GABAPENTIN 300 MG/1
CAPSULE ORAL
Qty: 270 CAPSULE | Refills: 2 | Status: SHIPPED | OUTPATIENT
Start: 2022-11-09 | End: 2023-02-07

## 2022-11-09 NOTE — TELEPHONE ENCOUNTER
Pharmacy sent request for refill of gabapentin. Pt was last seen 11.06.22  Next appt 12.06.22    Last filled by the pharmacy on 10.14.22.

## 2022-12-06 ENCOUNTER — OFFICE VISIT (OUTPATIENT)
Dept: PHYSICAL MEDICINE AND REHAB | Age: 67
End: 2022-12-06

## 2022-12-06 VITALS — DIASTOLIC BLOOD PRESSURE: 76 MMHG | HEART RATE: 62 BPM | TEMPERATURE: 97.8 F | SYSTOLIC BLOOD PRESSURE: 132 MMHG

## 2022-12-06 DIAGNOSIS — S88.111A TRAUMATIC BELOW-KNEE AMPUTATION OF RIGHT LOWER EXTREMITY WITH COMPLICATION, INITIAL ENCOUNTER (HCC): Primary | ICD-10-CM

## 2022-12-06 DIAGNOSIS — D36.13 NEUROMA OF RIGHT LOWER EXTREMITY: ICD-10-CM

## 2022-12-06 RX ORDER — DULOXETIN HYDROCHLORIDE 30 MG/1
30 CAPSULE, DELAYED RELEASE ORAL DAILY
Qty: 90 CAPSULE | Refills: 1 | Status: SHIPPED | OUTPATIENT
Start: 2022-12-06

## 2022-12-06 RX ORDER — ATORVASTATIN CALCIUM 20 MG/1
TABLET, FILM COATED ORAL
COMMUNITY
Start: 2022-11-09

## 2022-12-12 NOTE — PROGRESS NOTES
[facility] PHYSICAL MEDICINE & REHABILITATION  [address] [place] [address]  [place] [address]  Dept: [phone]  Dept Fax: [phone]    Outpatient Followup Note    Sylvia Lewis is a 74y.o.-year-old male presenting for follow up of remote traumatic right below-knee amputation, which took place in 1978, and neuroma. HPI:     He was last seen on 10/19/22 for follow up of remote traumatic right BKA and neuroma. Since that time, he reports an increase in pain at the distal right residual limb. He states that the pain is different than before surgery, as it feels like it is coming \"from inside. \"  He also states that the pain is worse than prior to surgery at this time. He reports feeling \"constant firing\" of the nerves in the right residual limb. He notes that he has not been able to wear his prosthesis much due to pain with ambulation. He has been using axillary crutches and a manual wheelchair for mobility. He states that he has had falls onto the right residual limb without any significant injury. He remains on gabapentin 900mg TID without any side effects. He was not able to tolerate lyrica in the past due to fatigue. For prosthetic evaluation:    Any problems with current prosthesis? Yes - He has lost weight, affecting the fit of his socket and category of foot. He also underwent a revision surgery to the right residual limb with change in shape of the limb. He has participated in physical therapy for training to use prosthesis. The patient is able to don and doff the prosthesis with no assistance. He does not require assistance at home. Patient would like to be able to continue to ambulate and complete ADLs independently. He enjoys gardening, working on his home, and visiting family and friends.       Past Medical History:   Diagnosis Date    Arthritis     Chronic right shoulder pain     Chronic shoulder pain, left 2001    had surgery to repair    Environmental allergies     History of general anesthesia complication 0/46/9017    airway closed off after intubation after cervical neck surgery, was remedied with a counteracting medication    HLD (hyperlipidemia)     Hypertension     DARYL on CPAP 4/22/2015    Dr. Fidelina Giraldo.  Auto, 12-16cm of H20    Respiratory failure, post-operative ([facility] [address] 5/16/2001    due to airway swelling obstruction after intubation due to certain medication    Rotator cuff tear, left 2014    Dr. Collins Balbuena cuff tear, right 4/27/2015    Dr. Cherelle Barriga,       Past Surgical History:   Procedure Laterality Date    AMPUTATION Right     right below knee amputation, due to industrial accident    [address] Left January 2013    [address] Right 08/03/2016    CERVICAL FUSION      C-spine fusion times 3 levels    CERVICAL LAMINECTOMY  January 2013    C 4-5-6-7    COLONOSCOPY  04/07/2006    COLONOSCOPY N/A 10/24/2018    COLONOSCOPY WITH BIOPSY and snare polypectomy performed by Jalen Renee MD at [facility] Left 12/15/2016    Reconstuctive Joint    HAND TENDON SURGERY      repair    HEMORRHOID SURGERY      KNEE SURGERY Bilateral     removed bursa and trimmed cartilage    LEG AMPUTATION BELOW KNEE Right 8/31/2022    RIGHT BELOW KNEE AMPUTATION REVISION performed by Paul White MD at [facility] Left 3/2014    Dr. Cherelle Barriga, open repair    ROTATOR CUFF REPAIR Right 4/27/15    SPINE SURGERY      see cervical laminectomy    TUMOR REMOVAL Left 2015    Dr. John allan tumor removed index finger     Family History   Problem Relation Age of Onset    Cancer Mother         breast with mets to brain, bone, lymph    Parkinsonism Father     Arthritis Father     Alzheimer's Disease Father     Heart Disease Maternal Grandmother     Heart Disease Maternal Grandfather      Social History     Socioeconomic History    Marital status:      Spouse name: None Number of children: None    Years of education: None    Highest education level: None   Occupational History    Occupation: contractor   Tobacco Use    Smoking status: Never    Smokeless tobacco: Never   Vaping Use    Vaping Use: Never used   Substance and Sexual Activity    Alcohol use: Yes     Alcohol/week: 1.0 standard drink     Types: 1 Glasses of wine per week     Comment: 1 glass of wine every 3 days    Drug use: No    Sexual activity: Yes     Partners: Female       Allergies   Allergen Reactions    Cat Hair Extract     Dog Epithelium     Other Rash     MD thinks he is allergic to something in the environment. Current Outpatient Medications   Medication Sig Dispense Refill    DULoxetine (CYMBALTA) 30 MG extended release capsule Take 1 capsule by mouth daily 90 capsule 1    gabapentin (NEURONTIN) 300 MG capsule take 3 capsules by mouth IN THE MORNING, AT NOON, AND BEFORE BEDTIME 270 capsule 2    tadalafil (CIALIS) 10 MG tablet take 1 tablet by mouth once daily if needed for ERECTILE DYSFUNCTION      oxyCODONE-acetaminophen (PERCOCET) 5-325 MG per tablet       colchicine (COLCRYS) 0.6 MG tablet Take 1 tablet by mouth in the morning.  30 tablet 2    metoprolol succinate (TOPROL XL) 25 MG extended release tablet take 1 tablet by mouth once daily 30 tablet 2    atorvastatin (LIPITOR) 40 MG tablet take 1 tablet by mouth every evening 90 tablet 1    Diclofenac Sodium 3 % CREA Apply topically      colchicine (COLCRYS) 0.6 MG tablet take 1 tablet by mouth once daily 30 tablet 3    ibuprofen (ADVIL;MOTRIN) 200 MG tablet Take 200 mg by mouth every 6 hours as needed for Pain #1-2 PO QD PRN      sildenafil (VIAGRA) 100 MG tablet take 1 tablet by mouth if needed for ERECTILE DYSFUNCTION 30 tablet 0    UREA 10 HYDRATING 10 % cream apply topically as directed if needed for DRY SKIN (CALLUS) DAILY      fexofenadine (ALLEGRA) 180 MG tablet Take 180 mg by mouth       atorvastatin (LIPITOR) 20 MG tablet       fluticasone (FLONASE) 50 MCG/ACT nasal spray 1 spray by Nasal route daily 1 Bottle 2     Current Facility-Administered Medications   Medication Dose Route Frequency Provider Last Rate Last Admin    albuterol (PROVENTIL) nebulizer solution 2.5 mg  2.5 mg Nebulization Once Daniel Duffy MD        ipratropium (ATROVENT) 0.02 % nebulizer solution 0.5 mg  0.5 mg Nebulization Once Daniel Duffy MD         Facility-Administered Medications Ordered in Other Visits   Medication Dose Route Frequency Provider Last Rate Last Admin    iothalamate (CONRAY) 43 % injection 15 mL  15 mL IntraVENous ONCE PRN Merlene Barillas MD           Subjective:      Review of Systems   Constitutional:  Positive for activity change. Musculoskeletal:  Positive for gait problem. Neurological:         +neuropathic pain     Objective:     Physical Exam  /76   Pulse 62   Temp 97.8 °F (36.6 °C)     Constitutional: He appears well-developed and well-nourished. In no distress. HEENT: NCAT, EOM grossly intact. Hearing grossly intact. Mucous membranes pink and moist.  Pulmonary/Chest: Respirations WNL and unlabored. MSK:  Right BKA. Tenderness to palpation of the distal right residual limb. Strength 5/5 in right residual limb. Neurological: He is alert and oriented to person, place, and time. Sensation to light touch intact in right residual limb. Ambulates with right lower limb prosthesis and axillary crutches. Skin: Skin is warm dry and intact with good turgor. Amputation incision well-healed. No erythema or wounds noted on the right residual limb. Psychiatric: He has a normal mood and affect. His behavior is normal. Thought content normal.    Nursing note and vitals reviewed. Reviewed notes from prosthetist.      Assessment:       Diagnosis Orders   1. Traumatic below-knee amputation of right lower extremity with complication, initial encounter ([facility] [address]        2.  Neuroma of right lower extremity             Plan:      - Continue gabapentin 900mg TID, as this has helped patient some with pain  - Provided prescription for cymbalta, as below, to see if this helps additionally with pain in the right residual limb  - Patient has a follow up appointment with Dr. Radha Amezcua in 2/2023 - discussed patient calling the office to see if he could be seen sooner to discuss increased pain in the right residual limb  - Could consider repeat MRI of the right lower limb if increased pain is persistent  - He is a previously high-functioning individual; is highly motivated; and would like to continue ambulating and completing ADLs independently, gardening, working on his home, and visiting family and friends. After review with the prosthetist, I agree this patient would benefit from a total contact acrylic socket with flexible insert, a flex walk system prosthetic foot with vacuum pump, two seal-in liners, and multi-ply prosthetic socks. He has the ability and potential to be a K3 ambulator - the patient has the ability or potential for ambulation with variable charisse typical of the community ambulator who has the ability to traverse most environmental barriers and may have vocational, therapeutic, or exercise activity that demands prosthetic utilization beyond simple locomotion. Orders Placed This Encounter   Medications    DULoxetine (CYMBALTA) 30 MG extended release capsule     Sig: Take 1 capsule by mouth daily     Dispense:  90 capsule     Refill:  1       Return in about 2 months (around 2/6/2023).        Electronically signed by Ashanti Garnica MD on 12/11/2022 at 9:30 PM.

## 2023-01-25 ENCOUNTER — OFFICE VISIT (OUTPATIENT)
Dept: NEUROLOGY | Age: 68
End: 2023-01-25
Payer: MEDICARE

## 2023-01-25 VITALS
HEIGHT: 68 IN | BODY MASS INDEX: 26.07 KG/M2 | DIASTOLIC BLOOD PRESSURE: 77 MMHG | OXYGEN SATURATION: 98 % | WEIGHT: 172 LBS | TEMPERATURE: 97.2 F | SYSTOLIC BLOOD PRESSURE: 143 MMHG | HEART RATE: 56 BPM

## 2023-01-25 DIAGNOSIS — R32 BOWEL AND BLADDER INCONTINENCE: ICD-10-CM

## 2023-01-25 DIAGNOSIS — M54.50 CHRONIC LEFT-SIDED LOW BACK PAIN, UNSPECIFIED WHETHER SCIATICA PRESENT: Primary | ICD-10-CM

## 2023-01-25 DIAGNOSIS — M47.816 LUMBAR SPONDYLOSIS: ICD-10-CM

## 2023-01-25 DIAGNOSIS — R15.9 BOWEL AND BLADDER INCONTINENCE: ICD-10-CM

## 2023-01-25 DIAGNOSIS — G89.29 CHRONIC LEFT-SIDED LOW BACK PAIN, UNSPECIFIED WHETHER SCIATICA PRESENT: Primary | ICD-10-CM

## 2023-01-25 PROCEDURE — 3078F DIAST BP <80 MM HG: CPT | Performed by: STUDENT IN AN ORGANIZED HEALTH CARE EDUCATION/TRAINING PROGRAM

## 2023-01-25 PROCEDURE — G8417 CALC BMI ABV UP PARAM F/U: HCPCS | Performed by: STUDENT IN AN ORGANIZED HEALTH CARE EDUCATION/TRAINING PROGRAM

## 2023-01-25 PROCEDURE — 1123F ACP DISCUSS/DSCN MKR DOCD: CPT | Performed by: STUDENT IN AN ORGANIZED HEALTH CARE EDUCATION/TRAINING PROGRAM

## 2023-01-25 PROCEDURE — 3017F COLORECTAL CA SCREEN DOC REV: CPT | Performed by: STUDENT IN AN ORGANIZED HEALTH CARE EDUCATION/TRAINING PROGRAM

## 2023-01-25 PROCEDURE — 99212 OFFICE O/P EST SF 10 MIN: CPT | Performed by: STUDENT IN AN ORGANIZED HEALTH CARE EDUCATION/TRAINING PROGRAM

## 2023-01-25 PROCEDURE — 3077F SYST BP >= 140 MM HG: CPT | Performed by: STUDENT IN AN ORGANIZED HEALTH CARE EDUCATION/TRAINING PROGRAM

## 2023-01-25 PROCEDURE — G8484 FLU IMMUNIZE NO ADMIN: HCPCS | Performed by: STUDENT IN AN ORGANIZED HEALTH CARE EDUCATION/TRAINING PROGRAM

## 2023-01-25 PROCEDURE — G8427 DOCREV CUR MEDS BY ELIG CLIN: HCPCS | Performed by: STUDENT IN AN ORGANIZED HEALTH CARE EDUCATION/TRAINING PROGRAM

## 2023-01-25 PROCEDURE — 1036F TOBACCO NON-USER: CPT | Performed by: STUDENT IN AN ORGANIZED HEALTH CARE EDUCATION/TRAINING PROGRAM

## 2023-01-25 RX ORDER — LIDOCAINE 4 G/G
1 PATCH TOPICAL DAILY
Qty: 30 PATCH | Refills: 0 | Status: SHIPPED | OUTPATIENT
Start: 2023-01-25 | End: 2023-02-24

## 2023-01-25 NOTE — PROGRESS NOTES
[address] [place] [address],  [place] [address], [facility], [facility], [address] [place] [address]  P: [phone]  F: [phone]    NEUROLOGY CLINIC NOTE     PATIENT NAME: Alyne Phalen  PATIENT MRN: [id number]  PRIMARY CARE PHYSICIAN: Ketan Flores PA-C    HPI:      Alyne Phalen is a 74 y.o. right handed  male with PMH significant for chronic right shoulder pain, on hyperlipidemia, hypertension, DARYL on CPAP, ACDF at C5-C7, right below-knee amputation, osteomyelitis, neuroma  as seen in the clinic for back pain    History obtained from Patient  Mr. Angel Mack presented to the clinic for chief complaint of back pain that has been going on for 2 years, back pain radiating to the left foot, with left foot tingling, numbness especially the toes. Back pain he rates around 5-6/10 in intensity, radiating, sharp shooting like pain. Patient also complaining of urinary and bladder incontinence for past 2 years    Patient has undergone right below-knee amputation, in 1979 secondary to interstitial accident, revision in September 2022 because of pain, patient probably continues to have phantom pain and for which he is on 900 3 times daily gabapentin. MRI C spine 12/13/2021  Straightening of cervical spine, diffuse desiccation, no abnormal cord signal intensity. ACDF at C5 C7  Mild to moderate central spinal canal stenosis C2-C5     MRI Lumbar spine: 10/13/2022  IMPRESSION:   *   Spinal stenosis L4-5 and L3-4 and to lesser degree L2-3 with degenerative changes. PATIENT HISTORY:     Past Medical History:   Diagnosis Date    Arthritis     Chronic right shoulder pain     Chronic shoulder pain, left 2001    had surgery to repair    Environmental allergies     History of general anesthesia complication 9/71/5613    airway closed off after intubation after cervical neck surgery, was remedied with a counteracting medication    HLD (hyperlipidemia)     Hypertension     DARYL on CPAP 4/22/2015    Dr. Niki Alcaraz. Auto, 12-16cm of H20    Respiratory failure, post-operative ([address] 5/16/2001    due to airway swelling obstruction after intubation due to certain medication    Rotator cuff tear, left 2014    Dr. Maxine Edwards cuff tear, right 4/27/2015    Dr. Pam Hernandez,         Past Surgical History:   Procedure Laterality Date    AMPUTATION Right     right below knee amputation, due to industrial accident    [address] Left January 2013    [address] Right 08/03/2016    CERVICAL FUSION      C-spine fusion times 3 levels    CERVICAL LAMINECTOMY  January 2013    C 4-5-6-7    COLONOSCOPY  04/07/2006    COLONOSCOPY N/A 10/24/2018    COLONOSCOPY WITH BIOPSY and snare polypectomy performed by Izaiah De Leon MD at [facility] Left 12/15/2016    Reconstuctive Joint    HAND TENDON SURGERY      repair    HEMORRHOID SURGERY      KNEE SURGERY Bilateral     removed bursa and trimmed cartilage    LEG AMPUTATION BELOW KNEE Right 8/31/2022    RIGHT BELOW KNEE AMPUTATION REVISION performed by Jeb Hassan MD at [facility] Left 3/2014    Dr. Pam Hernandez, open repair    ROTATOR CUFF REPAIR Right 4/27/15    SPINE SURGERY      see cervical laminectomy    TUMOR REMOVAL Left 2015    Dr. Lianne Johnson begin tumor removed index finger        Social History     Socioeconomic History    Marital status:      Spouse name: Not on file    Number of children: Not on file    Years of education: Not on file    Highest education level: Not on file   Occupational History    Occupation: contractor   Tobacco Use    Smoking status: Never    Smokeless tobacco: Never   Vaping Use    Vaping Use: Never used   Substance and Sexual Activity    Alcohol use:  Yes     Alcohol/week: 1.0 standard drink     Types: 1 Glasses of wine per week     Comment: 1 glass of wine every 3 days    Drug use: No    Sexual activity: Yes     Partners: Female   Other Topics Concern    Not on file   Social History Narrative    Not on file     Social Determinants of Health     Financial Resource Strain: Not on file   Food Insecurity: Not on file   Transportation Needs: Not on file   Physical Activity: Not on file   Stress: Not on file   Social Connections: Not on file   Intimate Partner Violence: Not on file   Housing Stability: Not on file        Current Outpatient Medications   Medication Sig Dispense Refill    atorvastatin (LIPITOR) 20 MG tablet       DULoxetine (CYMBALTA) 30 MG extended release capsule Take 1 capsule by mouth daily 90 capsule 1    gabapentin (NEURONTIN) 300 MG capsule take 3 capsules by mouth IN THE MORNING, AT NOON, AND BEFORE BEDTIME 270 capsule 2    tadalafil (CIALIS) 10 MG tablet take 1 tablet by mouth once daily if needed for ERECTILE DYSFUNCTION      oxyCODONE-acetaminophen (PERCOCET) 5-325 MG per tablet       colchicine (COLCRYS) 0.6 MG tablet Take 1 tablet by mouth in the morning.  30 tablet 2    metoprolol succinate (TOPROL XL) 25 MG extended release tablet take 1 tablet by mouth once daily 30 tablet 2    atorvastatin (LIPITOR) 40 MG tablet take 1 tablet by mouth every evening 90 tablet 1    Diclofenac Sodium 3 % CREA Apply topically      colchicine (COLCRYS) 0.6 MG tablet take 1 tablet by mouth once daily 30 tablet 3    ibuprofen (ADVIL;MOTRIN) 200 MG tablet Take 200 mg by mouth every 6 hours as needed for Pain #1-2 PO QD PRN      sildenafil (VIAGRA) 100 MG tablet take 1 tablet by mouth if needed for ERECTILE DYSFUNCTION 30 tablet 0    UREA 10 HYDRATING 10 % cream apply topically as directed if needed for DRY SKIN (CALLUS) DAILY      fexofenadine (ALLEGRA) 180 MG tablet Take 180 mg by mouth       fluticasone (FLONASE) 50 MCG/ACT nasal spray 1 spray by Nasal route daily 1 Bottle 2     Current Facility-Administered Medications   Medication Dose Route Frequency Provider Last Rate Last Admin    albuterol (PROVENTIL) nebulizer solution 2.5 mg  2.5 mg Nebulization Once Maury Sylvester MD ipratropium (ATROVENT) 0.02 % nebulizer solution 0.5 mg  0.5 mg Nebulization Once Jacinto Gross MD         Facility-Administered Medications Ordered in Other Visits   Medication Dose Route Frequency Provider Last Rate Last Admin    iothalamate (CONRAY) 43 % injection 15 mL  15 mL IntraVENous ONCE PRN Sara Morales MD            Allergies   Allergen Reactions    Cat Hair Extract     Dog Epithelium     Other Rash     MD thinks he is allergic to something in the environment. REVIEW OF SYSTEMS:     Review of Systems     VITALS  There were no vitals taken for this visit. PHYSICAL EXAMINATION:     Physical Exam     Constitutional: Well developed, well nourished and in no acute distress. Head:  normocephalic, atraumatic. Neck: supple, no carotid bruits, thyroid not palpable  Respiratory: Clear to auscultation bilaterally with no use of accessory muscles during respiration. Cardiovascular: normal rate, regular rhythm, no murmur, gallop, rub.   Abdomen: Soft, nontender, nondistended, normal bowel sounds,    Extremities:  peripheral pulses palpable, no pedal edema or calf pain with palpation  Psych: normal affect      NEUROLOGICAL EXAMINATION:     Mental status   Alert and oriented; intact memory with no confusion, speech or language problems; no hallucinations or delusions     Cranial nerves   II - visual fields intact to confrontation                                                III, IV, VI - extra-ocular muscles full: no pupillary defect; no STEPHEN, no nystagmus, no ptosis   V - normal facial sensation                                                               VII - normal facial symmetry                                                             VIII - intact hearing                                                                             IX, X - symmetrical palate                                                                  XI - symmetrical shoulder shrug                        XII - midline tongue without atrophy or fasciculation     Motor function  Normal muscle bulk and tone  Muscle strength: normal power 5/5  fine motor movements     Sensory function Intact to touch, pin prick, vibration, proprioception in bilateral upper and lower extremities.      Cerebellar Intact fine motor movement. No involuntary movements or tremors     Reflex function Intact 2+ DTR and symmetric. Negative Babinski     Gait                  Normal station and gait           PRIOR TESTS AND IMAGING: Following images and Labs were reviewed by the examiner       X ray Cervical spine : 1/11/2021  1. ACDF C5-C7 is unchanged in appearance.   2. Multilevel degenerative changes, most pronounced at C4-5.         MRI C Spine- 1/10/2022      ASSESSMENT         History of ACDF C5 C7  Chronic microvascular ischemic changes on MRI    PLAN:         Follow up in the clinic in   Instructed patient to call the clinic if symptoms worsen or develop any new symptoms.         Mr. Valadez received counseling on the following healthy behaviors: medical compliance, smoking cessation, blood pressure control, regular follow up with primary doctor.           Electronically signed by Amisha Leyva MD on 1/25/2023 at 12:51 PM

## 2023-01-25 NOTE — PATIENT INSTRUCTIONS
-Patient will be referred to urology for ruling out neurogenic bladder; as patient is having bowel bladder incontinence  -Patient will be referred to neurosurgery for lumbar radiculopathy  -Patient is also given lidocaine patch  -We will see patient in clinic in 3 months

## 2023-02-02 ENCOUNTER — OFFICE VISIT (OUTPATIENT)
Dept: VASCULAR SURGERY | Age: 68
End: 2023-02-02

## 2023-02-02 VITALS
BODY MASS INDEX: 26.07 KG/M2 | RESPIRATION RATE: 16 BRPM | HEART RATE: 95 BPM | WEIGHT: 172 LBS | DIASTOLIC BLOOD PRESSURE: 79 MMHG | TEMPERATURE: 97.4 F | SYSTOLIC BLOOD PRESSURE: 134 MMHG | HEIGHT: 68 IN | OXYGEN SATURATION: 95 %

## 2023-02-02 DIAGNOSIS — T87.33 NEUROMA OF AMPUTATION STUMP OF RIGHT LOWER EXTREMITY (HCC): ICD-10-CM

## 2023-02-02 DIAGNOSIS — Z89.511 S/P BELOW KNEE AMPUTATION, RIGHT (HCC): Primary | ICD-10-CM

## 2023-02-02 DIAGNOSIS — S88.111S: ICD-10-CM

## 2023-02-10 RX ORDER — GABAPENTIN 300 MG/1
CAPSULE ORAL
Qty: 270 CAPSULE | Refills: 2 | Status: SHIPPED | OUTPATIENT
Start: 2023-02-10 | End: 2023-05-11

## 2023-02-10 NOTE — TELEPHONE ENCOUNTER
Pharmacy requesting refill of gabapentin. Last filled 01/12/2023. Last seen 12/06/2022. Follow up scheduled for 02/15/2023.

## 2023-02-15 ENCOUNTER — OFFICE VISIT (OUTPATIENT)
Dept: PHYSICAL MEDICINE AND REHAB | Age: 68
End: 2023-02-15

## 2023-02-15 VITALS
TEMPERATURE: 97.3 F | HEIGHT: 68 IN | HEART RATE: 60 BPM | BODY MASS INDEX: 27.74 KG/M2 | DIASTOLIC BLOOD PRESSURE: 70 MMHG | WEIGHT: 183 LBS | SYSTOLIC BLOOD PRESSURE: 138 MMHG

## 2023-02-15 DIAGNOSIS — D36.13 NEUROMA OF RIGHT LOWER EXTREMITY: Primary | ICD-10-CM

## 2023-02-15 DIAGNOSIS — S88.111A TRAUMATIC BELOW-KNEE AMPUTATION OF RIGHT LOWER EXTREMITY WITH COMPLICATION, INITIAL ENCOUNTER (HCC): ICD-10-CM

## 2023-02-15 RX ORDER — DULOXETIN HYDROCHLORIDE 30 MG/1
30 CAPSULE, DELAYED RELEASE ORAL DAILY
Qty: 30 CAPSULE | Refills: 2 | Status: SHIPPED | OUTPATIENT
Start: 2023-02-15

## 2023-03-06 NOTE — PROGRESS NOTES
[address] [facility] PHYSICAL MEDICINE & REHABILITATION  [address] [place] [address]  [place] [address]  Dept: [phone]  Dept Fax: [phone]    Outpatient Followup Note    Burt Duron is a 74y.o.-year-old male presenting for follow up of remote traumatic right below-knee amputation, which took place in 1978, and neuroma. HPI:     He was last seen on 12/6/22 for follow up of remote right BKA and neuroma. Since that time, he reports doing okay. He states that he recently had COVID-19 but is now improved. He does note improvement in right distal lower limb pain after starting cymbalta. He reports that he is able to wear his prosthesis most of the day at this time. He states that he is still taking off the prosthesis earlier in the evening than he would like to but overall tolerance is better. He continues to take gabapentin 900mg TID as well. He reports that the plan is for casting of the right lower limb prosthetic socket soon. He states that he had a follow up appointment with Dr. Nba Gómez, which went fine. He also reports having an appointment with neurosurgery in March. Past Medical History:   Diagnosis Date    Arthritis     Chronic right shoulder pain     Chronic shoulder pain, left 2001    had surgery to repair    Environmental allergies     History of general anesthesia complication 9/05/5119    airway closed off after intubation after cervical neck surgery, was remedied with a counteracting medication    HLD (hyperlipidemia)     Hypertension     DARYL on CPAP 4/22/2015    Dr. Paco Duggan.  Auto, 12-16cm of H20    Respiratory failure, post-operative ([address] 5/16/2001    due to airway swelling obstruction after intubation due to certain medication    Rotator cuff tear, left 2014    Dr. Macey Rodriguez    Rotator cuff tear, right 4/27/2015    Dr. Macey Rodriguez,       Past Surgical History:   Procedure Laterality Date    AMPUTATION Right     right below knee amputation, due to industrial accident    [address] Left January 2013    CARPAL TUNNEL RELEASE Right 08/03/2016    CERVICAL FUSION      C-spine fusion times 3 levels    CERVICAL LAMINECTOMY  January 2013    C 4-5-6-7    COLONOSCOPY  04/07/2006    COLONOSCOPY N/A 10/24/2018    COLONOSCOPY WITH BIOPSY and snare polypectomy performed by Manuel Baker MD at [facility] Left 12/15/2016    Reconstuctive Joint    HAND TENDON SURGERY      repair    HEMORRHOID SURGERY      KNEE SURGERY Bilateral     removed bursa and trimmed cartilage    LEG AMPUTATION BELOW KNEE Right 8/31/2022    RIGHT BELOW KNEE AMPUTATION REVISION performed by Elaine Phillips MD at [facility] Left 3/2014    Dr. Jah Coyle, open repair    ROTATOR CUFF REPAIR Right 4/27/15    SPINE SURGERY      see cervical laminectomy    TUMOR REMOVAL Left 2015    Dr. Trang allan tumor removed index finger     Family History   Problem Relation Age of Onset    Cancer Mother         breast with mets to brain, bone, lymph    Parkinsonism Father     Arthritis Father     Alzheimer's Disease Father     Heart Disease Maternal Grandmother     Heart Disease Maternal Grandfather      Social History     Socioeconomic History    Marital status:    Occupational History    Occupation: contractor   Tobacco Use    Smoking status: Never    Smokeless tobacco: Never   Vaping Use    Vaping Use: Never used   Substance and Sexual Activity    Alcohol use: Yes     Alcohol/week: 1.0 standard drink     Types: 1 Glasses of wine per week     Comment: 1 glass of wine every 3 days    Drug use: No    Sexual activity: Yes     Partners: Female       Allergies   Allergen Reactions    Cat Hair Extract     Dog Epithelium     Other Rash     MD thinks he is allergic to something in the environment.        Current Outpatient Medications   Medication Sig Dispense Refill    DULoxetine (CYMBALTA) 30 MG extended release capsule Take 1 capsule by mouth daily 30 capsule 2    gabapentin (NEURONTIN) 300 MG capsule take 3 capsules by mouth IN THE MORNING, AT NOON, AND BEFORE BEDTIME 270 capsule 2    diclofenac sodium (VOLTAREN) 1 % GEL Apply 4 g topically 4 times daily 4 g 1    atorvastatin (LIPITOR) 20 MG tablet       colchicine (COLCRYS) 0.6 MG tablet Take 1 tablet by mouth in the morning.  30 tablet 2    metoprolol succinate (TOPROL XL) 25 MG extended release tablet take 1 tablet by mouth once daily 30 tablet 2    colchicine (COLCRYS) 0.6 MG tablet take 1 tablet by mouth once daily 30 tablet 3    ibuprofen (ADVIL;MOTRIN) 200 MG tablet Take 200 mg by mouth every 6 hours as needed for Pain #1-2 PO QD PRN      sildenafil (VIAGRA) 100 MG tablet take 1 tablet by mouth if needed for ERECTILE DYSFUNCTION 30 tablet 0    UREA 10 HYDRATING 10 % cream apply topically as directed if needed for DRY SKIN (CALLUS) DAILY      fexofenadine (ALLEGRA) 180 MG tablet Take 180 mg by mouth       tadalafil (CIALIS) 10 MG tablet take 1 tablet by mouth once daily if needed for ERECTILE DYSFUNCTION (Patient not taking: Reported on 2/15/2023)      oxyCODONE-acetaminophen (PERCOCET) 5-325 MG per tablet  (Patient not taking: Reported on 2/15/2023)      atorvastatin (LIPITOR) 40 MG tablet take 1 tablet by mouth every evening (Patient not taking: Reported on 2/15/2023) 90 tablet 1    fluticasone (FLONASE) 50 MCG/ACT nasal spray 1 spray by Nasal route daily 1 Bottle 2     Current Facility-Administered Medications   Medication Dose Route Frequency Provider Last Rate Last Admin    albuterol (PROVENTIL) nebulizer solution 2.5 mg  2.5 mg Nebulization Once Johana Amezcua MD        ipratropium (ATROVENT) 0.02 % nebulizer solution 0.5 mg  0.5 mg Nebulization Once Johana Amezcua MD         Facility-Administered Medications Ordered in Other Visits   Medication Dose Route Frequency Provider Last Rate Last Admin    iothalamate (CONRAY) 43 % injection 15 mL  15 mL IntraVENous ONCE PRN Izzy Coy Dillan Jung MD           Subjective:      Review of Systems   Constitutional:  Positive for activity change (improving). Musculoskeletal:  Positive for gait problem (improving). +right residual limb pain   Skin:  Negative for wound. Objective:     Physical Exam  /70   Pulse 60   Temp 97.3 °F (36.3 °C)   Ht 5' 8\" (1.727 m)   Wt 183 lb (83 kg)   BMI 27.83 kg/m²     Constitutional: He appears well-developed and well-nourished. In no distress. HEENT: NCAT, EOM grossly intact. Hearing grossly intact. Pulmonary/Chest: Respirations WNL and unlabored. MSK: Strength 5/5 in the right residual limb. Neurological: He is alert and oriented to person, place, and time. Sensation to light touch intact in the right residual limb. Ambulates with right lower limb prosthesis and no assistive device. Skin: Skin is warm dry and intact with good turgor. No erythema or wounds noted on the right residual limb. Psychiatric: He has a normal mood and affect. His behavior is normal. Thought content normal.    Nursing note and vitals reviewed. Reviewed notes from neurology. Assessment:       Diagnosis Orders   1. Neuroma of right lower extremity        2. Traumatic below-knee amputation of right lower extremity with complication, initial encounter [facility])             Plan:      - Continue cymbalta, as below, as patient has found it helpful for right residual limb pain  - Continue gabapentin 900mg TID, as this medication also helps with pain  - Follow up with vascular surgery as directed  - He has an appointment with neurosurgery scheduled in March      Orders Placed This Encounter   Medications    DULoxetine (CYMBALTA) 30 MG extended release capsule     Sig: Take 1 capsule by mouth daily     Dispense:  30 capsule     Refill:  2       Return in about 3 months (around 5/15/2023).        Electronically signed by Juan Maldonado MD on 3/5/2023 at 9:38 PM.

## 2023-03-09 ENCOUNTER — OFFICE VISIT (OUTPATIENT)
Dept: NEUROSURGERY | Age: 68
End: 2023-03-09
Payer: MEDICARE

## 2023-03-09 VITALS
TEMPERATURE: 97.5 F | BODY MASS INDEX: 28.07 KG/M2 | HEIGHT: 68 IN | WEIGHT: 185.2 LBS | DIASTOLIC BLOOD PRESSURE: 89 MMHG | SYSTOLIC BLOOD PRESSURE: 124 MMHG | HEART RATE: 60 BPM | OXYGEN SATURATION: 97 %

## 2023-03-09 DIAGNOSIS — M48.02 CERVICAL STENOSIS OF SPINAL CANAL: Primary | ICD-10-CM

## 2023-03-09 DIAGNOSIS — M48.062 SPINAL STENOSIS OF LUMBAR REGION WITH NEUROGENIC CLAUDICATION: ICD-10-CM

## 2023-03-09 PROCEDURE — 1124F ACP DISCUSS-NO DSCNMKR DOCD: CPT | Performed by: NEUROLOGICAL SURGERY

## 2023-03-09 PROCEDURE — 1036F TOBACCO NON-USER: CPT | Performed by: NEUROLOGICAL SURGERY

## 2023-03-09 PROCEDURE — 3079F DIAST BP 80-89 MM HG: CPT | Performed by: NEUROLOGICAL SURGERY

## 2023-03-09 PROCEDURE — 3017F COLORECTAL CA SCREEN DOC REV: CPT | Performed by: NEUROLOGICAL SURGERY

## 2023-03-09 PROCEDURE — G8417 CALC BMI ABV UP PARAM F/U: HCPCS | Performed by: NEUROLOGICAL SURGERY

## 2023-03-09 PROCEDURE — G8484 FLU IMMUNIZE NO ADMIN: HCPCS | Performed by: NEUROLOGICAL SURGERY

## 2023-03-09 PROCEDURE — G8427 DOCREV CUR MEDS BY ELIG CLIN: HCPCS | Performed by: NEUROLOGICAL SURGERY

## 2023-03-09 PROCEDURE — 3077F SYST BP >= 140 MM HG: CPT | Performed by: NEUROLOGICAL SURGERY

## 2023-03-09 PROCEDURE — 99204 OFFICE O/P NEW MOD 45 MIN: CPT | Performed by: NEUROLOGICAL SURGERY

## 2023-03-27 ENCOUNTER — OFFICE VISIT (OUTPATIENT)
Dept: PAIN MANAGEMENT | Age: 68
End: 2023-03-27

## 2023-03-27 VITALS — BODY MASS INDEX: 28.07 KG/M2 | HEIGHT: 68 IN | WEIGHT: 185.2 LBS

## 2023-03-27 DIAGNOSIS — M54.17 LUMBOSACRAL NEURITIS: ICD-10-CM

## 2023-03-27 DIAGNOSIS — M48.061 SPINAL STENOSIS OF LUMBAR REGION, UNSPECIFIED WHETHER NEUROGENIC CLAUDICATION PRESENT: Primary | ICD-10-CM

## 2023-03-27 ASSESSMENT — ENCOUNTER SYMPTOMS
BOWEL INCONTINENCE: 0
BACK PAIN: 1

## 2023-03-27 NOTE — PROGRESS NOTES
AMPUTATION Right     right below knee amputation, due to industrial accident    [address] Left January 2013    CARPAL TUNNEL RELEASE Right 08/03/2016    CERVICAL FUSION      C-spine fusion times 3 levels    CERVICAL LAMINECTOMY  January 2013    C 4-5-6-7    COLONOSCOPY  04/07/2006    COLONOSCOPY N/A 10/24/2018    COLONOSCOPY WITH BIOPSY and snare polypectomy performed by Ora Dakin, MD at [address] Left 12/15/2016    Reconstuctive Joint    HAND TENDON SURGERY      repair    HEMORRHOID SURGERY      KNEE SURGERY Bilateral     removed bursa and trimmed cartilage    LEG AMPUTATION BELOW KNEE Right 8/31/2022    RIGHT BELOW KNEE AMPUTATION REVISION performed by Mere Gaytan MD at [address] Left 3/2014    Dr. Bandar Jameson, open repair    ROTATOR CUFF REPAIR Right 4/27/15    SPINE SURGERY      see cervical laminectomy    TUMOR REMOVAL Left 2015    Dr. Homero Cruz begin tumor removed index finger       Allergies   Allergen Reactions    Cat Hair Extract     Dog Epithelium     Other Rash     MD thinks he is allergic to something in the environment.          Current Outpatient Medications:     DULoxetine (CYMBALTA) 30 MG extended release capsule, Take 1 capsule by mouth daily, Disp: 30 capsule, Rfl: 2    gabapentin (NEURONTIN) 300 MG capsule, take 3 capsules by mouth IN THE MORNING, AT NOON, AND BEFORE BEDTIME, Disp: 270 capsule, Rfl: 2    diclofenac sodium (VOLTAREN) 1 % GEL, Apply 4 g topically 4 times daily, Disp: 4 g, Rfl: 1    atorvastatin (LIPITOR) 20 MG tablet, , Disp: , Rfl:     tadalafil (CIALIS) 10 MG tablet, take 1 tablet by mouth once daily if needed for ERECTILE DYSFUNCTION (Patient not taking: No sig reported), Disp: , Rfl:     oxyCODONE-acetaminophen (PERCOCET) 5-325 MG per tablet, , Disp: , Rfl:     colchicine (COLCRYS) 0.6 MG tablet, Take 1 tablet by mouth in the morning., Disp: 30 tablet, Rfl: 2    metoprolol succinate (TOPROL XL) 25 MG

## 2023-04-04 ENCOUNTER — HOSPITAL ENCOUNTER (OUTPATIENT)
Dept: PHYSICAL THERAPY | Facility: CLINIC | Age: 68
Setting detail: THERAPIES SERIES
Discharge: HOME | End: 2023-04-04
Payer: MEDICARE

## 2023-04-04 PROCEDURE — 97140 MANUAL THERAPY 1/> REGIONS: CPT

## 2023-04-04 PROCEDURE — 97162 PT EVAL MOD COMPLEX 30 MIN: CPT

## 2023-04-04 PROCEDURE — 97110 THERAPEUTIC EXERCISES: CPT

## 2023-04-05 NOTE — FLOWSHEET NOTE
[facility] Fall Risk Assessment    Patient Name:  Guero Reed  : 1955    Risk Factor Scale  Score   History of Falls [] Yes  [x] No 25  0 0   Secondary Diagnosis [] Yes  [x] No 15  0 0   Ambulatory Aid [] Furniture  [] Crutches/cane/walker  [x] None/bedrest/wheelchair/nurse 30  15  0 0   IV/Heparin Lock [] Yes  [x] No 20  0 0   Gait/Transferring [] Impaired  [] Weak  [x] Normal/bedrest/immobile 20  10  0 0   Mental Status [] Forgets limitations  [x] Oriented to own ability 15  0 0      Total:0     Based on the Assessment score: check the appropriate box.     [x]  No intervention needed   Low =   Score of 0-24    []  Use standard prevention interventions Moderate =  Score of 24-44   [] Give patient handout and discuss fall prevention strategies   [] Establish goal of education for patient/family RE: fall prevention strategies    []  Use high risk prevention interventions High = Score of 45 and higher   [] Give patient handout and discuss fall prevention strategies   [] Establish goal of education for patient/family Re: fall prevention strategies   [] Discuss lifeline / other resources    Electronically signed by:   Damion Pagan, PT  Date: 2023

## 2023-04-17 ENCOUNTER — TELEPHONE (OUTPATIENT)
Dept: PHYSICAL MEDICINE AND REHAB | Age: 68
End: 2023-04-17

## 2023-04-17 DIAGNOSIS — S81.801A WOUND OF RIGHT LOWER EXTREMITY, INITIAL ENCOUNTER: ICD-10-CM

## 2023-04-17 DIAGNOSIS — Z89.511 S/P BKA (BELOW KNEE AMPUTATION) UNILATERAL, RIGHT (HCC): Primary | ICD-10-CM

## 2023-04-17 NOTE — TELEPHONE ENCOUNTER
I called Claude Wu to ask about the medication/Tylenol #4 that was on the questionnaire [facility] faxed over. He said that was prescribed after surgery with Dr Sami Salazar- he only took a few. Claude Wu states he saw Dr Jose Castaneda for his low back pain that is going down to his left foot. Dr Jose Castaneda ordered therapy and he started that on 4/4. But he said it's been pretty bad pain. He also said he has a sore on the residual limb and he thinks he should see wound care. He has a new prosthesis. He said \"it's the same sore he gets from time to time\". He said last time he went to [facility] wound care but he would be fine with [facility] wound care. I gave him an appt for next week-- 4/26 so you can take a look at it as well. We might need the documentation for a c-9 as well.      I entered the referral.

## 2023-04-18 ENCOUNTER — APPOINTMENT (OUTPATIENT)
Dept: PHYSICAL THERAPY | Facility: CLINIC | Age: 68
End: 2023-04-18
Payer: MEDICARE

## 2023-04-21 ENCOUNTER — HOSPITAL ENCOUNTER (OUTPATIENT)
Dept: PHYSICAL THERAPY | Facility: CLINIC | Age: 68
Setting detail: THERAPIES SERIES
Discharge: HOME | End: 2023-04-21
Payer: MEDICARE

## 2023-04-21 PROCEDURE — 97110 THERAPEUTIC EXERCISES: CPT

## 2023-04-21 NOTE — FLOWSHEET NOTE
program as demonstrated by performance with correct form without cues. LTG: (to be met in 16 treatments)  Patient will walk > 1/4 mile without left leg pain or tingling. Patient will sleep without interruption from back pain. Patient will stand > 30 minutes. Patient will improve Oswestry > 10%. Patient goals: Reduce pain    Pt. Education:  [] Yes  [] No  [x] Reviewed Prior HEP/Ed  Method of Education: [] Verbal  [] Demo  [] Written  Comprehension of Education:  [x] Verbalizes understanding. [] Demonstrates understanding. [x] Needs review. [] Demonstrates/verbalizes HEP/Ed previously given. Plan: [x] Continue current frequency toward long and short term goals.     [] Specific Instructions for subsequent treatments:      Frequency:  2 x/week for 16 visits     Time In: 1193           Time Out: 8463    Electronically signed by:  Mick Castro PTA

## 2023-04-23 ASSESSMENT — ENCOUNTER SYMPTOMS
ALLERGIC/IMMUNOLOGIC NEGATIVE: 1
ABDOMINAL PAIN: 0
COLOR CHANGE: 0
SHORTNESS OF BREATH: 0
CHEST TIGHTNESS: 0

## 2023-04-24 NOTE — DISCHARGE INSTRUCTIONS
[address]      Visit  Discharge Instructions / Physician Orders  DATE: 4/25/23     Home Care:     SUPPLIES ORDERED THRU:          [facility]           DATE LAST SUPPLIED 4/25/26     Wound Location:  Right proximal lower leg stump     Cleanse with: Liquid antibacterial soap and water, rinse well      Dressing Orders:  Primary dressing      Cami                 Secondary dressing     silicone border dressing                      secure with           x 30days     Frequency:  Daily     Additional Orders: Increase protein to diet (meat, cheese, eggs, fish, peanut butter, nuts and beans)  Multivitamin daily    OFFLOADING [x] YES  TYPE:                  [] NA  Try not to put pressure on Right knee  Weekly wound care visits until determined otherwise. Antibiotic therapy-wound care related YES [] NO [] NA[x]    MY CHART []     Smart Device  []     HYPERBARIC TREATMENT-                TREATMENT #                          Your next appointment with the [facility] is in 1 week                                                                                                   (Please note your next appointment above and if you are unable to keep, kindly give a 24 hour notice. Thank you.)  If more than 15 min late we cannot guarantee you will be seen due to clinician schedule  Per Policy, Excessive cancellation will call for dismissal from program.  If you experience any of the following, please call the [facility] during business hours:  [phone]     * Increase in Pain  * Temperature over 101  * Increase in drainage from your wound  * Drainage with a foul odor  * Bleeding  * Increase in swelling  * Need for compression bandage changes due to slippage, breakthrough drainage. If you need medical attention outside of the business hours of the [facility] please contact your PCP or go to the nearest emergency room.      The information contained in the

## 2023-04-25 ENCOUNTER — HOSPITAL ENCOUNTER (OUTPATIENT)
Dept: PHYSICAL THERAPY | Facility: CLINIC | Age: 68
Setting detail: THERAPIES SERIES
Discharge: HOME | End: 2023-04-25
Payer: MEDICARE

## 2023-04-25 ENCOUNTER — HOSPITAL ENCOUNTER (OUTPATIENT)
Dept: WOUND CARE | Age: 68
Discharge: HOME | End: 2023-04-25
Payer: COMMERCIAL

## 2023-04-25 VITALS
TEMPERATURE: 98 F | RESPIRATION RATE: 20 BRPM | WEIGHT: 185 LBS | BODY MASS INDEX: 28.04 KG/M2 | HEART RATE: 66 BPM | HEIGHT: 68 IN | DIASTOLIC BLOOD PRESSURE: 72 MMHG | SYSTOLIC BLOOD PRESSURE: 128 MMHG

## 2023-04-25 DIAGNOSIS — Z89.511 S/P BELOW KNEE AMPUTATION, RIGHT (HCC): Primary | ICD-10-CM

## 2023-04-25 DIAGNOSIS — L84 CALLUS: ICD-10-CM

## 2023-04-25 DIAGNOSIS — L97.212 NON-PRESSURE CHRONIC ULCER OF CALF WITH FAT LAYER EXPOSED, RIGHT (HCC): ICD-10-CM

## 2023-04-25 PROCEDURE — 99213 OFFICE O/P EST LOW 20 MIN: CPT

## 2023-04-25 PROCEDURE — 99214 OFFICE O/P EST MOD 30 MIN: CPT | Performed by: PODIATRIST

## 2023-04-25 PROCEDURE — 97110 THERAPEUTIC EXERCISES: CPT

## 2023-04-25 PROCEDURE — 11042 DBRDMT SUBQ TIS 1ST 20SQCM/<: CPT | Performed by: PODIATRIST

## 2023-04-25 PROCEDURE — 97140 MANUAL THERAPY 1/> REGIONS: CPT

## 2023-04-25 PROCEDURE — 11042 DBRDMT SUBQ TIS 1ST 20SQCM/<: CPT

## 2023-04-25 RX ORDER — CYCLOBENZAPRINE HCL 5 MG
5 TABLET ORAL EVERY 8 HOURS PRN
COMMUNITY
Start: 2023-04-19 | End: 2023-04-26

## 2023-04-25 RX ORDER — VARDENAFIL HYDROCHLORIDE 10 MG/1
10 TABLET ORAL DAILY PRN
COMMUNITY
Start: 2023-04-19

## 2023-04-25 ASSESSMENT — PAIN DESCRIPTION - DESCRIPTORS: DESCRIPTORS: BURNING

## 2023-04-25 ASSESSMENT — PAIN DESCRIPTION - ORIENTATION: ORIENTATION: RIGHT

## 2023-04-25 ASSESSMENT — PAIN - FUNCTIONAL ASSESSMENT: PAIN_FUNCTIONAL_ASSESSMENT: ACTIVITIES ARE NOT PREVENTED

## 2023-04-25 ASSESSMENT — PAIN DESCRIPTION - LOCATION: LOCATION: LEG

## 2023-04-25 ASSESSMENT — PAIN SCALES - GENERAL: PAINLEVEL_OUTOF10: 3

## 2023-04-25 NOTE — PLAN OF CARE
Problem: Discharge Planning  Goal: Discharge to home or other facility with appropriate resources  Outcome: Progressing     Problem: Pain  Goal: Verbalizes/displays adequate comfort level or baseline comfort level  Outcome: Progressing     Problem: Safety - Adult  Goal: Free from fall injury  Outcome: Progressing     Problem: Wound:  Goal: Will show signs of wound healing; wound closure and no evidence of infection  Description: Will show signs of wound healing; wound closure and no evidence of infection  Outcome: Progressing

## 2023-04-25 NOTE — PROGRESS NOTES
[address] [facility] Floor:     [address]:     [address] [facility] [address]  [phone]  [phone]     Patient Information:      Suman Iraheta  [id number]   : 1955  AGE: 74 y.o. GENDER: male   EPISODE DATE: 2023    Insurance:      PRIMARY INSURANCE:  Plan: ALOK MCO  Coverage: ALOK MCO  Effective Date: 1978  Group Number: [unfilled]  Subscriber Number: [id number] - (Commercial)    Payer/Plan Subscr  Sex Relation Sub. Ins. ID Effective Group Num   1. ALOK XageekO Parviz Mc 1955 Male Self [id number] 1976                                    [address]   2.   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, right ([address]    Callus    S/P below knee amputation, right ([address] - Primary       WOUNDS REQUIRING DRESSING SUPPLIES:     Incision 04/27/15 Shoulder Right (Active)   Number of days: 2920       Wound 23 Pretibial Proximal;Right Wound #1 Right prox lower leg stump (Active)   Wound Image     23   Wound Etiology Other 23   Wound Length (cm) 1 cm 23   Wound Width (cm) 1 cm 23   Wound Depth (cm) 1 cm 23   Wound Surface Area (cm^2) 1 cm^2 23   Wound Volume (cm^3) 1 cm^3 23   Post-Procedure Length (cm) 1 cm 23   Post-Procedure Width (cm) 1 cm 23   Post-Procedure Depth (cm) 1 cm 23   Post-Procedure Surface Area (cm^2) 1 cm^2 23   Post-Procedure Volume (cm^3) 1 cm^3 23   Wound Assessment Devitalized tissue 23   Drainage Amount Moderate 23   Drainage
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 Patient was given personalized discharge instructions. Diagnosis was discussed with the pt and all of their questions were answered in detail. Proper foot hygiene and care was discussed with the pt. Patient to check feet daily and contact the office with any questions/problems/concerns. Other comorbidity noted and will be managed by PCP. All labs were reviewed and all imagining including the above findings were reviewed PRIOR to and during the patients arrival and with the patient today. Previous patient encounter was reviewed. Encounters from the patients other medical providers were reviewed and noted. Time was spent educating the patient and their families/caregivers on proper care of the feet and ankles. All the above diagnosis were addressed at todays visit and all questions were answered. A total of 35 minutes was spent with this patients encounter which included charting after the patients visit    Procedure Note  Indications:  Based on my examination of this patient's wound(s)/ulcer(s) today, debridement is required to promote healing and evaluate the wound base.     Performed by: Sujata Pacheco DPM    Consent obtained:  Yes    Time out taken:  Yes    Pain Control: Anesthetic  Anesthetic: 4% Lidocaine Liquid Topical       Wound Location: right  Pre Debridement Measurements:  Are located in the Wound/Ulcer Documentation Flow Sheet    Wound/Ulcer #: 1    Procedure in Detail: Lidocaine

## 2023-04-25 NOTE — FLOWSHEET NOTE
[] [address]  [address]  [phone]  F: [phone] [] [address]  [address]  P: [phone]  F: [phone] [] [address]  Therapy  [address] [facility]  P: [phone]  F: [phone] [x] [address]  P: [phone]  F: [phone] [] [address]  [facility]   [address]   [place]: [phone]  F: [phone]      Physical Therapy Daily Treatment Note    Date:  2023  Patient Name:  Vicky Restrepo    :  1955  MRN: [id number]  Physician: Carolee Alfaro MD                                      Insurance: [facility] ($45 co-pay, medical necessity)  Medical Diagnosis:   M48.061 (ICD-10-CM) - Spinal stenosis of lumbar region, unspecified whether neurogenic claudication present   M54.17 (ICD-10-CM) - Lumbosacral neuritis   Rehab Codes: Low Back Pain with radiculopathy,  M54.16  Onset Date: 2021                      Next 's appt.: Pending    Visit# / total visits: ; Progress note for Medicare patient due at visit 10     Cancels/No Shows: 0/0    Subjective:    Pain:  [x] Yes  [] No  Location: Lumbar pain   Pain Rating: (0-10 scale) 1/10  Pain altered Tx:  [x] No  [] Yes  Action:  Comments: Pt reports no significant change in back pain. He had treatment at the wound center for a wound on his residual limb.     Objective:   Modalities:   Precautions: R BK amputation, Bowel and Bladder incontinence  Exercises:  Exercise Reps/ Time Weight/ Level Comments           Supine       SKTC 2x10   Hold 30\" on last rep x   PPT 2x10  3\" hold   x   PPT+march  2x10   x   Seated lumbar flexion stretch 2x30\"     x   SLR 10x ea   tA engage, attention to eccentric phase   x          Prone

## 2023-04-26 ENCOUNTER — OFFICE VISIT (OUTPATIENT)
Dept: PHYSICAL MEDICINE AND REHAB | Age: 68
End: 2023-04-26

## 2023-04-26 VITALS
HEART RATE: 64 BPM | HEIGHT: 68 IN | TEMPERATURE: 97.9 F | WEIGHT: 187 LBS | SYSTOLIC BLOOD PRESSURE: 118 MMHG | DIASTOLIC BLOOD PRESSURE: 74 MMHG | BODY MASS INDEX: 28.34 KG/M2

## 2023-04-26 DIAGNOSIS — S88.111A TRAUMATIC BELOW-KNEE AMPUTATION OF RIGHT LOWER EXTREMITY WITH COMPLICATION, INITIAL ENCOUNTER (HCC): ICD-10-CM

## 2023-04-26 DIAGNOSIS — S81.801A WOUND OF RIGHT LOWER EXTREMITY, INITIAL ENCOUNTER: Primary | ICD-10-CM

## 2023-04-26 DIAGNOSIS — D36.13 NEUROMA OF RIGHT LOWER EXTREMITY: ICD-10-CM

## 2023-04-28 ENCOUNTER — HOSPITAL ENCOUNTER (OUTPATIENT)
Dept: PHYSICAL THERAPY | Facility: CLINIC | Age: 68
Setting detail: THERAPIES SERIES
Discharge: HOME | End: 2023-04-28
Payer: MEDICARE

## 2023-04-28 PROCEDURE — 97140 MANUAL THERAPY 1/> REGIONS: CPT

## 2023-04-28 PROCEDURE — 97110 THERAPEUTIC EXERCISES: CPT

## 2023-04-28 ASSESSMENT — ENCOUNTER SYMPTOMS: BACK PAIN: 1

## 2023-04-28 NOTE — PROGRESS NOTES
[address] [facility] PHYSICAL MEDICINE & REHABILITATION  [address] [place] [address]  [place] [address]  Dept: [phone]  Dept Fax: [phone]    Outpatient Followup Note    Xiomara Vanessa is a 74y.o.-year-old male presenting for follow up of remote traumatic right below-knee amputation, which took place in 1978, and neuroma. HPI:     He was last seen on 2/15/23 for follow up of remote right BKA and neuroma. Since that time, he reports doing okay. He notes a few areas on the right residual limb that intermittently develop wounds. He states that he has always had issues at the right knee and has noticed another, more distal, area since 2017. He reports having more difficult with these areas over the last few months, developing callus-like lesions followed by inflammation and bleeding. He notes associated pain when the wounds are worse. He states that he has been working with his prosthetist to offload pressure in these two areas to try to prevent further wounds from developing. He has obtained a new prosthesis since the last visit. He did go to wound care for an initial evaluation yesterday, and they did some debridement. He is scheduled to follow up with wound care next week. Otherwise, he reports ongoing pain in the right residual limb for which he is still taking gabapentin and cymbalta with some relief. Also, he saw pain management for low back and left lower limb pain, and they ordered physical therapy, which he has been doing for about 1 month now. If his pain does not improve with PT, then they will consider injections.       Past Medical History:   Diagnosis Date    Arthritis     Chronic right shoulder pain     Chronic shoulder pain, left 2001    had surgery to repair    Environmental allergies     History of general anesthesia complication 4/70/9593    airway closed off after intubation after cervical neck surgery, was remedied with a

## 2023-04-28 NOTE — FLOWSHEET NOTE
[] [facility]  [address]  [phone]  F: [phone] [] [address]  [address]   [address]  P: [phone]  F: [phone] [] [address]  Therapy  [address] [facility]  P: [phone]  F: [phone] [x] [address]  P: [phone]  F: [phone] [] [address]  [facility]   [address]   [place]: [phone]  F: [phone]      Physical Therapy Daily Treatment Note    Date:  2023  Patient Name:  Young Martel    :  1955  MRN: [id number]  Physician: Salty Scales MD                                      Insurance: [facility] ($13 co-pay, medical necessity)  Medical Diagnosis:   M48.061 (ICD-10-CM) - Spinal stenosis of lumbar region, unspecified whether neurogenic claudication present   M54.17 (ICD-10-CM) - Lumbosacral neuritis   Rehab Codes: Low Back Pain with radiculopathy,  M54.16  Onset Date: 2021                      Next 's appt.: Pending    Visit# / total visits: ; Progress note for Medicare patient due at visit 10     Cancels/No Shows: 0/0    Subjective:    Pain:  [x] Yes  [] No  Location: Lumbar pain   Pain Rating: (0-10 scale) 3/10  Pain altered Tx:  [x] No  [] Yes  Action:  Comments: Pt reports no significant change in back pain from last treatment and sensation in his L foot is better. Pt also reports his wound is still bothering him.     Objective:   Modalities:   Precautions: R BK amputation, Bowel and Bladder incontinence  Exercises:  Exercise Reps/ Time Weight/ Level Comments           Supine       SKTC 2x10   Hold 30\" on last rep    PPT 2x10  3\" hold   x   PPT+march  2x10   x   PPT+bike 2x10   x   PPT+SLR 10x ea  Attention to eccentric phase x   LTR 2x5 ea

## 2023-05-02 ENCOUNTER — OFFICE VISIT (OUTPATIENT)
Dept: NEUROLOGY | Age: 68
End: 2023-05-02
Payer: MEDICARE

## 2023-05-02 ENCOUNTER — HOSPITAL ENCOUNTER (OUTPATIENT)
Dept: WOUND CARE | Age: 68
Discharge: HOME | End: 2023-05-02
Payer: COMMERCIAL

## 2023-05-02 VITALS
TEMPERATURE: 97 F | RESPIRATION RATE: 22 BRPM | SYSTOLIC BLOOD PRESSURE: 153 MMHG | WEIGHT: 187 LBS | HEART RATE: 58 BPM | OXYGEN SATURATION: 98 % | BODY MASS INDEX: 28.43 KG/M2 | DIASTOLIC BLOOD PRESSURE: 88 MMHG

## 2023-05-02 VITALS
HEART RATE: 62 BPM | SYSTOLIC BLOOD PRESSURE: 122 MMHG | BODY MASS INDEX: 28.34 KG/M2 | WEIGHT: 187 LBS | RESPIRATION RATE: 20 BRPM | TEMPERATURE: 96.9 F | DIASTOLIC BLOOD PRESSURE: 67 MMHG | HEIGHT: 68 IN

## 2023-05-02 DIAGNOSIS — L97.212 NON-PRESSURE CHRONIC ULCER OF CALF WITH FAT LAYER EXPOSED, RIGHT (HCC): ICD-10-CM

## 2023-05-02 DIAGNOSIS — Z89.511 S/P BELOW KNEE AMPUTATION, RIGHT (HCC): Primary | ICD-10-CM

## 2023-05-02 DIAGNOSIS — R32 BOWEL AND BLADDER INCONTINENCE: Primary | ICD-10-CM

## 2023-05-02 DIAGNOSIS — R15.9 BOWEL AND BLADDER INCONTINENCE: Primary | ICD-10-CM

## 2023-05-02 DIAGNOSIS — M54.12 LEFT CERVICAL RADICULOPATHY: ICD-10-CM

## 2023-05-02 PROCEDURE — G8427 DOCREV CUR MEDS BY ELIG CLIN: HCPCS | Performed by: STUDENT IN AN ORGANIZED HEALTH CARE EDUCATION/TRAINING PROGRAM

## 2023-05-02 PROCEDURE — 1036F TOBACCO NON-USER: CPT | Performed by: STUDENT IN AN ORGANIZED HEALTH CARE EDUCATION/TRAINING PROGRAM

## 2023-05-02 PROCEDURE — 11042 DBRDMT SUBQ TIS 1ST 20SQCM/<: CPT | Performed by: PODIATRIST

## 2023-05-02 PROCEDURE — 3074F SYST BP LT 130 MM HG: CPT | Performed by: STUDENT IN AN ORGANIZED HEALTH CARE EDUCATION/TRAINING PROGRAM

## 2023-05-02 PROCEDURE — 11042 DBRDMT SUBQ TIS 1ST 20SQCM/<: CPT

## 2023-05-02 PROCEDURE — 1124F ACP DISCUSS-NO DSCNMKR DOCD: CPT | Performed by: STUDENT IN AN ORGANIZED HEALTH CARE EDUCATION/TRAINING PROGRAM

## 2023-05-02 PROCEDURE — 99214 OFFICE O/P EST MOD 30 MIN: CPT | Performed by: STUDENT IN AN ORGANIZED HEALTH CARE EDUCATION/TRAINING PROGRAM

## 2023-05-02 PROCEDURE — 3017F COLORECTAL CA SCREEN DOC REV: CPT | Performed by: STUDENT IN AN ORGANIZED HEALTH CARE EDUCATION/TRAINING PROGRAM

## 2023-05-02 PROCEDURE — 3078F DIAST BP <80 MM HG: CPT | Performed by: STUDENT IN AN ORGANIZED HEALTH CARE EDUCATION/TRAINING PROGRAM

## 2023-05-02 PROCEDURE — G8417 CALC BMI ABV UP PARAM F/U: HCPCS | Performed by: STUDENT IN AN ORGANIZED HEALTH CARE EDUCATION/TRAINING PROGRAM

## 2023-05-02 RX ORDER — LIDOCAINE 40 MG/G
CREAM TOPICAL ONCE
OUTPATIENT
Start: 2023-05-02 | End: 2023-05-02

## 2023-05-02 RX ORDER — BETAMETHASONE DIPROPIONATE 0.05 %
OINTMENT (GRAM) TOPICAL ONCE
OUTPATIENT
Start: 2023-05-02 | End: 2023-05-02

## 2023-05-02 RX ORDER — GINSENG 100 MG
CAPSULE ORAL ONCE
OUTPATIENT
Start: 2023-05-02 | End: 2023-05-02

## 2023-05-02 RX ORDER — GENTAMICIN SULFATE 1 MG/G
OINTMENT TOPICAL ONCE
OUTPATIENT
Start: 2023-05-02 | End: 2023-05-02

## 2023-05-02 RX ORDER — BACITRACIN ZINC AND POLYMYXIN B SULFATE 500; 1000 [USP'U]/G; [USP'U]/G
OINTMENT TOPICAL ONCE
OUTPATIENT
Start: 2023-05-02 | End: 2023-05-02

## 2023-05-02 RX ORDER — LIDOCAINE HYDROCHLORIDE 20 MG/ML
JELLY TOPICAL ONCE
OUTPATIENT
Start: 2023-05-02 | End: 2023-05-02

## 2023-05-02 RX ORDER — LIDOCAINE 50 MG/G
OINTMENT TOPICAL ONCE
OUTPATIENT
Start: 2023-05-02 | End: 2023-05-02

## 2023-05-02 RX ORDER — CLOBETASOL PROPIONATE 0.5 MG/G
OINTMENT TOPICAL ONCE
OUTPATIENT
Start: 2023-05-02 | End: 2023-05-02

## 2023-05-02 RX ORDER — LIDOCAINE HYDROCHLORIDE 40 MG/ML
SOLUTION TOPICAL ONCE
OUTPATIENT
Start: 2023-05-02 | End: 2023-05-02

## 2023-05-02 RX ORDER — BACITRACIN, NEOMYCIN, POLYMYXIN B 400; 3.5; 5 [USP'U]/G; MG/G; [USP'U]/G
OINTMENT TOPICAL ONCE
OUTPATIENT
Start: 2023-05-02 | End: 2023-05-02

## 2023-05-02 ASSESSMENT — PAIN DESCRIPTION - ORIENTATION: ORIENTATION: RIGHT

## 2023-05-02 ASSESSMENT — PAIN DESCRIPTION - PAIN TYPE: TYPE: CHRONIC PAIN

## 2023-05-02 ASSESSMENT — PAIN SCALES - GENERAL: PAINLEVEL_OUTOF10: 2

## 2023-05-02 ASSESSMENT — PAIN DESCRIPTION - DESCRIPTORS: DESCRIPTORS: BURNING

## 2023-05-02 ASSESSMENT — PAIN DESCRIPTION - LOCATION: LOCATION: LEG

## 2023-05-02 ASSESSMENT — PAIN - FUNCTIONAL ASSESSMENT: PAIN_FUNCTIONAL_ASSESSMENT: ACTIVITIES ARE NOT PREVENTED

## 2023-05-02 NOTE — PATIENT INSTRUCTIONS
-Keep on the same medication  -Follow-up with urology  -Follow-up with neurosurgery for back pain  -Follow-up with

## 2023-05-02 NOTE — PROGRESS NOTES
[address]  Return Patient History and Physical      Christa Sofia  AGE: 74 y.o. GENDER: male  : 1955  TODAY'S DATE:  2023    Chief Complaint:   Chief Complaint   Patient presents with    Wound Check     Right BKA stump         History of the Present Illness       Christa Sofia is a 74 y.o. male who presents today for evaluation and treatment for a pressure and undetermined wound which is located on the right. Doing better. History of Wound: history of right BKA, from work related injury in the . Recently had revision of stump. Prosthesis fitting better. Has had callous, bumps, present since . He does use his knee to get around when he does not have his prosthesis on. Has had these treated before. No pain. Wound Type:pressure  Wound Location:right leg  Modifying factors:none    Pain Level: 2   Pain Assessment: 0-10     Abx :No   Cultures :were notobtained  Pain : 3/10    PAST MEDICAL HISTORY        Diagnosis Date    Arthritis     Chronic right shoulder pain     Chronic shoulder pain, left     had surgery to repair    Environmental allergies     History of general anesthesia complication 3/61/2885    airway closed off after intubation after cervical neck surgery, was remedied with a counteracting medication    HLD (hyperlipidemia)     Hypertension     DARYL on CPAP 2015    Dr. Emerson Tamayo.  Auto, 12-16cm of H20    Respiratory failure, post-operative ([facility] [address] 2001    due to airway swelling obstruction after intubation due to certain medication    Rotator cuff tear, left     Dr. Fabian Worthington    Rotator cuff tear, right 2015    Dr. Fabian Worthington,        MEDICATIONS    Current Outpatient Medications on File Prior to Encounter   Medication Sig Dispense Refill    vardenafil (LEVITRA) 10 MG tablet Take 1 tablet by mouth daily as needed      DULoxetine (CYMBALTA) 30 MG extended release capsule Take 1 capsule by mouth daily 30 capsule 2    gabapentin (NEURONTIN) 300 MG capsule take 3

## 2023-05-02 NOTE — PROGRESS NOTES
ptosis  V     -     Normal facial sensation   VII    -     Normal facial symmetry  VIII   -     Intact hearing   IX,X -     Symmetrical palate  XI    -     Symmetrical shoulder shrug  XII   -     Midline tongue, no atrophy    MOTOR FUNCTION: RUE: Significant for good strength of grade 5/5 in proximal and distal muscle groups   LUE: Significant for good strength of grade 5/5 in proximal and distal muscle groups   RLE: Significant for good strength of grade 5/5 in proximal and distal muscle groups   LLE: Significant for good strength of grade 5/5 in proximal and distal muscle groups      Normal bulk, normal tone and no involuntary movements, no tremor   SENSORY FUNCTION:  Normal touch, normal pin, normal vibration, normal proprioception   CEREBELLAR FUNCTION:  Intact fine motor control over upper limbs and lower limbs   REFLEX FUNCTION:  Symmetric in upper and lower extremities, no Babinski sign   STATION and GAIT  Normal gait and tandem station, BKA left side          ASSESSMENT:     -MRI C-spine 12/13/2021: No abnormal cord signal intensity, ACDF at C5 C7, mild to moderate central spinal canal stenosis C2 C5,  -MRI lumbar spine 10/13/2022: Spinal stenosis L4-L5, L3-L4, and to the lesser degree L2-L3, with degenerative changes    Patient last clinic visit was on 1/25/2023: Patient impression is lumbar spondylosis with loss of control of the bowel and bladder, pain seems to be very difficult to control and patient was referred to neurosurgery for evaluation, and referral to urology    PLAN:      -Keep on the same medication  -Follow-up with urology  -Follow-up with neurosurgery for back pain  -Follow-up with rehab and physical therapy      Mr. Biju Mora received counseling on the following healthy behaviors: medical compliance, smoking cessation, blood pressure control, regular follow up with primary doctor.         Electronically signed by Lissa Emanuel MD on 5/2/2023 at 3:02 PM

## 2023-05-08 ENCOUNTER — OFFICE VISIT (OUTPATIENT)
Dept: PAIN MANAGEMENT | Age: 68
End: 2023-05-08
Payer: MEDICARE

## 2023-05-08 VITALS — HEIGHT: 68 IN | BODY MASS INDEX: 28.34 KG/M2 | WEIGHT: 187 LBS

## 2023-05-08 DIAGNOSIS — M47.812 CERVICAL SPONDYLOSIS: Primary | ICD-10-CM

## 2023-05-08 DIAGNOSIS — M54.12 CERVICAL RADICULITIS: ICD-10-CM

## 2023-05-08 PROCEDURE — 99214 OFFICE O/P EST MOD 30 MIN: CPT | Performed by: PAIN MEDICINE

## 2023-05-08 PROCEDURE — G8427 DOCREV CUR MEDS BY ELIG CLIN: HCPCS | Performed by: PAIN MEDICINE

## 2023-05-08 PROCEDURE — 1036F TOBACCO NON-USER: CPT | Performed by: PAIN MEDICINE

## 2023-05-08 PROCEDURE — G8417 CALC BMI ABV UP PARAM F/U: HCPCS | Performed by: PAIN MEDICINE

## 2023-05-08 PROCEDURE — 1124F ACP DISCUSS-NO DSCNMKR DOCD: CPT | Performed by: PAIN MEDICINE

## 2023-05-08 PROCEDURE — 3017F COLORECTAL CA SCREEN DOC REV: CPT | Performed by: PAIN MEDICINE

## 2023-05-08 ASSESSMENT — ENCOUNTER SYMPTOMS
BOWEL INCONTINENCE: 0
BACK PAIN: 1
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
## 2023-05-09 ENCOUNTER — HOSPITAL ENCOUNTER (OUTPATIENT)
Dept: PHYSICAL THERAPY | Facility: CLINIC | Age: 68
Setting detail: THERAPIES SERIES
Discharge: HOME | End: 2023-05-09
Payer: MEDICARE

## 2023-05-09 ENCOUNTER — HOSPITAL ENCOUNTER (OUTPATIENT)
Dept: WOUND CARE | Age: 68
Discharge: HOME | End: 2023-05-09
Payer: COMMERCIAL

## 2023-05-09 VITALS
BODY MASS INDEX: 28.34 KG/M2 | TEMPERATURE: 96.4 F | HEIGHT: 68 IN | WEIGHT: 187 LBS | HEART RATE: 62 BPM | SYSTOLIC BLOOD PRESSURE: 158 MMHG | RESPIRATION RATE: 22 BRPM | DIASTOLIC BLOOD PRESSURE: 81 MMHG

## 2023-05-09 DIAGNOSIS — L97.212 NON-PRESSURE CHRONIC ULCER OF CALF WITH FAT LAYER EXPOSED, RIGHT (HCC): ICD-10-CM

## 2023-05-09 DIAGNOSIS — Z89.511 S/P BELOW KNEE AMPUTATION, RIGHT (HCC): Primary | ICD-10-CM

## 2023-05-09 PROCEDURE — 97140 MANUAL THERAPY 1/> REGIONS: CPT

## 2023-05-09 PROCEDURE — 99213 OFFICE O/P EST LOW 20 MIN: CPT

## 2023-05-09 PROCEDURE — 99213 OFFICE O/P EST LOW 20 MIN: CPT | Performed by: PODIATRIST

## 2023-05-09 PROCEDURE — 97110 THERAPEUTIC EXERCISES: CPT

## 2023-05-09 RX ORDER — BETAMETHASONE DIPROPIONATE 0.05 %
OINTMENT (GRAM) TOPICAL ONCE
OUTPATIENT
Start: 2023-05-09 | End: 2023-05-09

## 2023-05-09 RX ORDER — LIDOCAINE HYDROCHLORIDE 20 MG/ML
JELLY TOPICAL ONCE
OUTPATIENT
Start: 2023-05-09 | End: 2023-05-09

## 2023-05-09 RX ORDER — GENTAMICIN SULFATE 1 MG/G
OINTMENT TOPICAL ONCE
OUTPATIENT
Start: 2023-05-09 | End: 2023-05-09

## 2023-05-09 RX ORDER — LIDOCAINE 40 MG/G
CREAM TOPICAL ONCE
OUTPATIENT
Start: 2023-05-09 | End: 2023-05-09

## 2023-05-09 RX ORDER — BACITRACIN, NEOMYCIN, POLYMYXIN B 400; 3.5; 5 [USP'U]/G; MG/G; [USP'U]/G
OINTMENT TOPICAL ONCE
OUTPATIENT
Start: 2023-05-09 | End: 2023-05-09

## 2023-05-09 RX ORDER — BACITRACIN ZINC AND POLYMYXIN B SULFATE 500; 1000 [USP'U]/G; [USP'U]/G
OINTMENT TOPICAL ONCE
OUTPATIENT
Start: 2023-05-09 | End: 2023-05-09

## 2023-05-09 RX ORDER — LIDOCAINE HYDROCHLORIDE 40 MG/ML
SOLUTION TOPICAL ONCE
OUTPATIENT
Start: 2023-05-09 | End: 2023-05-09

## 2023-05-09 RX ORDER — LIDOCAINE 50 MG/G
OINTMENT TOPICAL ONCE
OUTPATIENT
Start: 2023-05-09 | End: 2023-05-09

## 2023-05-09 RX ORDER — CLOBETASOL PROPIONATE 0.5 MG/G
OINTMENT TOPICAL ONCE
OUTPATIENT
Start: 2023-05-09 | End: 2023-05-09

## 2023-05-09 RX ORDER — LIDOCAINE HYDROCHLORIDE 40 MG/ML
SOLUTION TOPICAL ONCE
Status: COMPLETED | OUTPATIENT
Start: 2023-05-09 | End: 2023-05-09

## 2023-05-09 RX ORDER — GINSENG 100 MG
CAPSULE ORAL ONCE
OUTPATIENT
Start: 2023-05-09 | End: 2023-05-09

## 2023-05-09 RX ADMIN — LIDOCAINE HYDROCHLORIDE 5 ML: 40 SOLUTION TOPICAL at 08:53

## 2023-05-09 ASSESSMENT — PAIN DESCRIPTION - DESCRIPTORS: DESCRIPTORS: BURNING

## 2023-05-09 ASSESSMENT — PAIN DESCRIPTION - ONSET: ONSET: ON-GOING

## 2023-05-09 ASSESSMENT — PAIN SCALES - GENERAL: PAINLEVEL_OUTOF10: 4

## 2023-05-09 ASSESSMENT — PAIN - FUNCTIONAL ASSESSMENT: PAIN_FUNCTIONAL_ASSESSMENT: PREVENTS OR INTERFERES SOME ACTIVE ACTIVITIES AND ADLS

## 2023-05-09 ASSESSMENT — PAIN DESCRIPTION - ORIENTATION: ORIENTATION: RIGHT;LOWER

## 2023-05-09 ASSESSMENT — PAIN DESCRIPTION - PAIN TYPE: TYPE: CHRONIC PAIN

## 2023-05-09 ASSESSMENT — PAIN DESCRIPTION - FREQUENCY: FREQUENCY: INTERMITTENT

## 2023-05-09 ASSESSMENT — PAIN DESCRIPTION - LOCATION: LOCATION: LEG

## 2023-05-09 NOTE — FLOWSHEET NOTE
radicular pain and signs and symptoms consistent with a diagnosis of spinal stenosis. He presents with back and left leg pain, abdominal weakness, decreased range of motion and functional limitations. He will benefit from skilled PT to address above deficts. STG/LTG:  STG: (to be met in 6 treatments)  ? Pain: 4/10. Met  ? Function: Patient will tolerate sitting > 1 hour. Patient to be independent with home exercise program as demonstrated by performance with correct form without cues. LTG: (to be met in 16 treatments)  Patient will walk > 1/4 mile without left leg pain or tingling. Patient will sleep without interruption from back pain. Patient will stand > 30 minutes. Patient will improve Oswestry > 10%. Patient goals: Reduce pain    Pt. Education:  [] Yes  [] No  [x] Reviewed Prior HEP/Ed  Method of Education: [] Verbal  [] Demo  [] Written  Comprehension of Education:  [] Verbalizes understanding. [] Demonstrates understanding. [] Needs review. [x] Demonstrates/verbalizes HEP/Ed previously given. Plan: [x] Continue current frequency toward long and short term goals.     [x] Specific Instructions for subsequent treatments:  Flexion based lumbar stretching and core strengthening     Frequency:  2 x/week for 16 visits     Time In: 11:08         Time Out: 12:08    Electronically signed by:  Cayetano Post PT

## 2023-05-09 NOTE — PROGRESS NOTES
SecondSkin also  prosthesis       1. Discussed appropriate home care of this wound. 2. Dressings:     3. Follow up: 2 week. 4. Detailed home instructions and education material given to patient prior to discharge.          Electronically signed by Tessa Bloom DPM on 5/9/2023 at 8:52 AM

## 2023-05-12 ENCOUNTER — APPOINTMENT (OUTPATIENT)
Dept: PHYSICAL THERAPY | Facility: CLINIC | Age: 68
End: 2023-05-12
Payer: MEDICARE

## 2023-05-16 ENCOUNTER — APPOINTMENT (OUTPATIENT)
Dept: PHYSICAL THERAPY | Facility: CLINIC | Age: 68
End: 2023-05-16
Payer: MEDICARE

## 2023-05-19 ENCOUNTER — APPOINTMENT (OUTPATIENT)
Dept: PHYSICAL THERAPY | Facility: CLINIC | Age: 68
End: 2023-05-19
Payer: MEDICARE

## 2023-05-22 RX ORDER — GABAPENTIN 300 MG/1
CAPSULE ORAL
Qty: 270 CAPSULE | Refills: 2 | Status: SHIPPED | OUTPATIENT
Start: 2023-05-22 | End: 2023-08-20

## 2023-05-22 NOTE — TELEPHONE ENCOUNTER
Pharmacy requesting refill of gabapentin. Last filled 04/19/2023. Last seen 04/26/2023. Follow up is scheduled for 07/27/2023.

## 2023-05-23 ENCOUNTER — HOSPITAL ENCOUNTER (OUTPATIENT)
Dept: WOUND CARE | Age: 68
Discharge: HOME | End: 2023-05-23
Payer: MEDICARE

## 2023-05-23 VITALS
HEIGHT: 68 IN | WEIGHT: 187 LBS | RESPIRATION RATE: 20 BRPM | SYSTOLIC BLOOD PRESSURE: 130 MMHG | TEMPERATURE: 97.4 F | BODY MASS INDEX: 28.34 KG/M2 | DIASTOLIC BLOOD PRESSURE: 64 MMHG | HEART RATE: 67 BPM

## 2023-05-23 DIAGNOSIS — Z89.511 S/P BELOW KNEE AMPUTATION, RIGHT (HCC): Primary | ICD-10-CM

## 2023-05-23 DIAGNOSIS — M79.604 PAIN OF RIGHT LOWER EXTREMITY: ICD-10-CM

## 2023-05-23 DIAGNOSIS — L84 CALLUS: ICD-10-CM

## 2023-05-23 DIAGNOSIS — L97.212 NON-PRESSURE CHRONIC ULCER OF CALF WITH FAT LAYER EXPOSED, RIGHT (HCC): ICD-10-CM

## 2023-05-23 PROCEDURE — 11042 DBRDMT SUBQ TIS 1ST 20SQCM/<: CPT

## 2023-05-23 PROCEDURE — 11042 DBRDMT SUBQ TIS 1ST 20SQCM/<: CPT | Performed by: PODIATRIST

## 2023-05-23 RX ORDER — LIDOCAINE 50 MG/G
OINTMENT TOPICAL ONCE
OUTPATIENT
Start: 2023-05-23 | End: 2023-05-23

## 2023-05-23 RX ORDER — LIDOCAINE HYDROCHLORIDE 20 MG/ML
JELLY TOPICAL ONCE
OUTPATIENT
Start: 2023-05-23 | End: 2023-05-23

## 2023-05-23 RX ORDER — BACITRACIN ZINC AND POLYMYXIN B SULFATE 500; 1000 [USP'U]/G; [USP'U]/G
OINTMENT TOPICAL ONCE
OUTPATIENT
Start: 2023-05-23 | End: 2023-05-23

## 2023-05-23 RX ORDER — LIDOCAINE HYDROCHLORIDE 40 MG/ML
SOLUTION TOPICAL ONCE
OUTPATIENT
Start: 2023-05-23 | End: 2023-05-23

## 2023-05-23 RX ORDER — CLOBETASOL PROPIONATE 0.5 MG/G
OINTMENT TOPICAL ONCE
OUTPATIENT
Start: 2023-05-23 | End: 2023-05-23

## 2023-05-23 RX ORDER — BETAMETHASONE DIPROPIONATE 0.05 %
OINTMENT (GRAM) TOPICAL ONCE
OUTPATIENT
Start: 2023-05-23 | End: 2023-05-23

## 2023-05-23 RX ORDER — LIDOCAINE HYDROCHLORIDE 40 MG/ML
SOLUTION TOPICAL ONCE
Status: COMPLETED | OUTPATIENT
Start: 2023-05-23 | End: 2023-05-23

## 2023-05-23 RX ORDER — BACITRACIN, NEOMYCIN, POLYMYXIN B 400; 3.5; 5 [USP'U]/G; MG/G; [USP'U]/G
OINTMENT TOPICAL ONCE
OUTPATIENT
Start: 2023-05-23 | End: 2023-05-23

## 2023-05-23 RX ORDER — GENTAMICIN SULFATE 1 MG/G
OINTMENT TOPICAL ONCE
OUTPATIENT
Start: 2023-05-23 | End: 2023-05-23

## 2023-05-23 RX ORDER — GINSENG 100 MG
CAPSULE ORAL ONCE
OUTPATIENT
Start: 2023-05-23 | End: 2023-05-23

## 2023-05-23 RX ORDER — SENNOSIDES/DOCUSATE SODIUM 8.6MG-50MG
1 TABLET ORAL PRN
Qty: 12 EACH | Refills: 5 | Status: SHIPPED | OUTPATIENT
Start: 2023-05-23

## 2023-05-23 RX ORDER — LIDOCAINE 40 MG/G
CREAM TOPICAL ONCE
OUTPATIENT
Start: 2023-05-23 | End: 2023-05-23

## 2023-05-23 RX ADMIN — LIDOCAINE HYDROCHLORIDE 5 ML: 40 SOLUTION TOPICAL at 08:50

## 2023-05-23 ASSESSMENT — PAIN DESCRIPTION - PAIN TYPE: TYPE: CHRONIC PAIN

## 2023-05-23 ASSESSMENT — PAIN DESCRIPTION - DESCRIPTORS: DESCRIPTORS: BURNING

## 2023-05-23 ASSESSMENT — PAIN DESCRIPTION - FREQUENCY: FREQUENCY: INTERMITTENT

## 2023-05-23 ASSESSMENT — PAIN - FUNCTIONAL ASSESSMENT: PAIN_FUNCTIONAL_ASSESSMENT: PREVENTS OR INTERFERES SOME ACTIVE ACTIVITIES AND ADLS

## 2023-05-23 ASSESSMENT — PAIN DESCRIPTION - ORIENTATION: ORIENTATION: RIGHT;LOWER

## 2023-05-23 ASSESSMENT — PAIN DESCRIPTION - LOCATION: LOCATION: LEG

## 2023-05-23 ASSESSMENT — PAIN DESCRIPTION - ONSET: ONSET: ON-GOING

## 2023-05-23 ASSESSMENT — PAIN SCALES - GENERAL: PAINLEVEL_OUTOF10: 4

## 2023-05-23 NOTE — PROGRESS NOTES
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 Patient was given personalized discharge instructions. Diagnosis was discussed with the pt and all of their questions were answered in detail. Proper foot hygiene and care was discussed with the pt. Patient to check feet daily and contact the office with any questions/problems/concerns. Other comorbidity noted and will be managed by PCP. Procedure Note  Indications:  Based on my examination of this patient's wound(s)/ulcer(s) today, debridement is required to promote healing and evaluate the wound base. Performed by: Caridad Cuenca DPM    Consent obtained:  Yes    Time out taken:  Yes    Pain Control: Anesthetic  Anesthetic: 4% Lidocaine Liquid Topical       Wound Location: right  Pre Debridement Measurements:  Are located in the Wound/Ulcer Documentation Flow Sheet    Wound/Ulcer #: 1    Procedure in Detail: Lidocaine gel soaked gauze was applied at beginning of wound evaluation. The wound(s) was excisionally debrided sharply of fibrotic, necrotic, and hyperkeratotic tissue, including a layer of surrounding healthy tissue using curette and #15 blade scalpel. The wound was debrided down through and including subcutaneous.     Percent of Wound Debrided: 100%    Total Surface Area Debrided:  0.01 sq cm     Bleeding: Minimal    Post

## 2023-05-25 ENCOUNTER — HOSPITAL ENCOUNTER (OUTPATIENT)
Dept: PHYSICAL THERAPY | Age: 68
Setting detail: THERAPIES SERIES
Discharge: HOME | End: 2023-05-25
Payer: MEDICARE

## 2023-05-25 PROCEDURE — 97140 MANUAL THERAPY 1/> REGIONS: CPT

## 2023-05-25 PROCEDURE — 97110 THERAPEUTIC EXERCISES: CPT

## 2023-05-25 NOTE — FLOWSHEET NOTE
[facility]  Physical Therapy Daily Treatment Note/PROGRESS NOTE    Date:  2023  Patient Name:  Jovanni Mccarthy    :  1955  MRN: [id number]  Physician: Mary Saravia MD                                      Insurance: [facility] ($96 co-pay, medical necessity)  Medical Diagnosis:   Cervical pain M54.2  M48.061 (ICD-10-CM) - Spinal stenosis of lumbar region, unspecified whether neurogenic claudication present   M54.17 (ICD-10-CM) - Lumbosacral neuritis   Rehab Codes: Low Back Pain with radiculopathy,  M54.16; M54.2  Onset Date: 2021                      Next 's appt.: Pending    Visit# / total visits: ; Progress note for Medicare patient due at visit 10     Cancels/No Shows: 0/0    Subjective:  Patient with continued complaints of back pain and complaints of (L) LE radicular symptoms to the foot. Did not give specific things that irritate his low back symptoms, but did not that he was getting some improvement of symptoms in the back with current exercises being done at other therapy place. Patient with constant versus intermittent symptoms into the foot. Has been doing generalized exercises with emphasis on SKTC/flexion at home. Medical history includes below knee amputation (R), cervical fusion multilevel C4-C7 per the patient. Arm symptoms include constant symptoms to the (B) hands, mostly on the 2nd-5th digits versus into the thumb. Patient notes difficulty with dropping objects, limitations with (L) rotation and (L) SB with clinical testing. Presented as a possible disc issues due to increased symptoms with protrusion and slightly decreased with traction and retraction. Pain:  [x] Yes  [] No  Location: Lumbar pain with (L) radiculopathy ; neck pain into (B) UEs and hands (L) more than (R) 2nd-5th digit  Pain Rating: (0-10 scale) 3; 5 /10  Pain altered Tx:  [x] No  [] Yes  Action:  Comments: Back pain is same but L leg  is sore including sensation in leg.  Pt  his wound is

## 2023-05-31 ENCOUNTER — HOSPITAL ENCOUNTER (OUTPATIENT)
Dept: PHYSICAL THERAPY | Age: 68
Setting detail: THERAPIES SERIES
Discharge: HOME | End: 2023-05-31
Payer: MEDICARE

## 2023-05-31 PROCEDURE — 97140 MANUAL THERAPY 1/> REGIONS: CPT

## 2023-05-31 PROCEDURE — 97110 THERAPEUTIC EXERCISES: CPT

## 2023-05-31 NOTE — FLOWSHEET NOTE
given.     Plan: [x] Continue current frequency toward long and short term goals.     [x] Specific Instructions for subsequent treatments:       Frequency:  2 x/week for 16 visits     Time In: 2:46 pm  Time Out: 3:30 pm     Electronically signed by:  Nancy Bennett PTA

## 2023-06-02 ENCOUNTER — HOSPITAL ENCOUNTER (OUTPATIENT)
Dept: PHYSICAL THERAPY | Age: 68
Setting detail: THERAPIES SERIES
Discharge: HOME | End: 2023-06-02
Payer: MEDICARE

## 2023-06-02 PROCEDURE — 97140 MANUAL THERAPY 1/> REGIONS: CPT

## 2023-06-02 PROCEDURE — 97110 THERAPEUTIC EXERCISES: CPT

## 2023-06-02 NOTE — FLOWSHEET NOTE
[facility]  Physical Therapy Daily Treatment Note    Date:  2023  Patient Name:  Bigg Talley    :  1955  MRN: [id number]  Physician: Anayeli Escamilla MD                                      Insurance: [facility] ($01 co-pay, medical necessity)  Medical Diagnosis:   Cervical pain M54.2  M48.061 (ICD-10-CM) - Spinal stenosis of lumbar region, unspecified whether neurogenic claudication present   M54.17 (ICD-10-CM) - Lumbosacral neuritis   Rehab Codes: Low Back Pain with radiculopathy,  M54.16; M54.2  Onset Date: 2021                      Next 's appt.: Pending    Visit# / total visits: ; Progress note for Medicare patient due at visit 10     Cancels/No Shows: 0/0    Subjective:  Notes continued improvement of the back pain and intensity of the radicular pain at this time. Is consistently doing LTR, SKTC,  hip flexor stretching, and sidelying rotation at home. 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 Pain:  [x] Yes  [] No  Location: Cervical into L shoulder, LB Pain Rating: (0-10 scale) 3/10 cervical; 2/10 LB  Pain altered Tx:  [x] No  [] Yes  Action:  Comments: Patient reports today that cervical region has been more sore following last treatment session. Patient thinks that eval for cervical symptoms increased symptoms. Reported minimal pain in LB today. Objective:     Modalities:   Precautions: R BK amputation, Bowel and Bladder incontinence  Exercises:  Exercise Reps/ Time Weight/ Level Comments  Completed Today               Supine           SKTC 2x10  3\"   x   PPT 2x10  3\" hold   X   PPT+march  2x10        PPT+bike 2x10   Completed 10 on each side.   x   PPT+SLR 10x ea   Attention to eccentric phase x   LTR 2x5 ea 5\" hold    x   Crunch 2x10   Touch and go    EOB Hip flexor stretch 2x30\" ea                   Prone          Hip ext 10x ea   5/9 tried total gym and increased pain in R knee               Seated lumbar

## 2023-06-05 ENCOUNTER — HOSPITAL ENCOUNTER (OUTPATIENT)
Dept: PHYSICAL THERAPY | Age: 68
Setting detail: THERAPIES SERIES
Discharge: HOME | End: 2023-06-05
Payer: MEDICARE

## 2023-06-05 ENCOUNTER — OFFICE VISIT (OUTPATIENT)
Dept: ORTHOPEDIC SURGERY | Age: 68
End: 2023-06-05

## 2023-06-05 VITALS — BODY MASS INDEX: 28.34 KG/M2 | WEIGHT: 187 LBS | RESPIRATION RATE: 16 BRPM | HEIGHT: 68 IN

## 2023-06-05 DIAGNOSIS — M25.511 RIGHT SHOULDER PAIN, UNSPECIFIED CHRONICITY: ICD-10-CM

## 2023-06-05 DIAGNOSIS — M19.011 OSTEOARTHRITIS OF RIGHT GLENOHUMERAL JOINT: Primary | ICD-10-CM

## 2023-06-05 DIAGNOSIS — M75.102 ROTATOR CUFF TEAR ARTHROPATHY OF LEFT SHOULDER: ICD-10-CM

## 2023-06-05 DIAGNOSIS — M12.812 ROTATOR CUFF TEAR ARTHROPATHY OF LEFT SHOULDER: ICD-10-CM

## 2023-06-05 DIAGNOSIS — M25.512 LEFT SHOULDER PAIN, UNSPECIFIED CHRONICITY: ICD-10-CM

## 2023-06-05 PROCEDURE — 97140 MANUAL THERAPY 1/> REGIONS: CPT

## 2023-06-05 PROCEDURE — 97110 THERAPEUTIC EXERCISES: CPT

## 2023-06-05 RX ORDER — LIDOCAINE HYDROCHLORIDE 10 MG/ML
5 INJECTION, SOLUTION INFILTRATION; PERINEURAL ONCE
Status: COMPLETED | OUTPATIENT
Start: 2023-06-05 | End: 2023-06-05

## 2023-06-05 RX ORDER — TRIAMCINOLONE ACETONIDE 40 MG/ML
40 INJECTION, SUSPENSION INTRA-ARTICULAR; INTRAMUSCULAR ONCE
Status: COMPLETED | OUTPATIENT
Start: 2023-06-05 | End: 2023-06-05

## 2023-06-05 RX ADMIN — LIDOCAINE HYDROCHLORIDE 5 ML: 10 INJECTION, SOLUTION INFILTRATION; PERINEURAL at 11:43

## 2023-06-05 RX ADMIN — TRIAMCINOLONE ACETONIDE 40 MG: 40 INJECTION, SUSPENSION INTRA-ARTICULAR; INTRAMUSCULAR at 11:43

## 2023-06-05 NOTE — FLOWSHEET NOTE
[facility]  Physical Therapy Daily Treatment Note    Date:  2023  Patient Name:  Neela Saini    :  1955  MRN: [id number]  Physician: Debbie Ovalle MD                                      Insurance: [facility] ($89 co-pay, medical necessity)  Medical Diagnosis:   Cervical pain M54.2  M48.061 (ICD-10-CM) - Spinal stenosis of lumbar region, unspecified whether neurogenic claudication present   M54.17 (ICD-10-CM) - Lumbosacral neuritis   Rehab Codes: Low Back Pain with radiculopathy,  M54.16; M54.2  Onset Date: 2021                      Next 's appt.: Pending    Visit# / total visits: 10/16; Progress note for Medicare patient due at visit 10     Cancels/No Shows: 0/0    Subjective:    Pain:  [x] Yes  [] No  Location: Cervical into L shoulder, LB Pain Rating: (0-10 scale) 7/10 cervical; 3/10 LB  Pain altered Tx:  [x] No  [] Yes  Action:  Comments: Patient reports today that he had increased cervical pain following last PT session. Reported that he thinks it was sleeping position that night that increased pain and it has not subsided. Reported that he does get some relief from manual, but thinks sleeping position then flared up pain. Objective:     Modalities:   Precautions: R BK amputation, Bowel and Bladder incontinence  Exercises:  Exercise Reps/ Time Weight/ Level Comments  Completed Today               Supine           SKTC 2x10  3\"   x   PPT 2x10  3\" hold      PPT+march  2x10        PPT+bike 2x10   Completed 10 on each side. x   PPT+SLR 10x ea   Attention to eccentric phase    LTR 2x5 ea 5\" hold    x   Crunch 2x10   Touch and go    EOB Hip flexor stretch 2x30\" ea        Bridges 10x   x              Prone          Hip ext 10x ea   5/9 tried total gym and increased pain in R knee               Seated lumbar flexion stretch 2x30\"   Today 15 reps w/ 5\" sec hold.            Seated       Cervical Retraction 10x2, 3\" hold      Bilateral Horizontal Abduction 10x2 Yellow     Bilateral

## 2023-06-05 NOTE — PROGRESS NOTES
Facility-Administered Medications   Medication Dose Route Frequency Provider Last Rate Last Admin    albuterol (PROVENTIL) nebulizer solution 2.5 mg  2.5 mg Nebulization Once Perez Ward MD        ipratropium (ATROVENT) 0.02 % nebulizer solution 0.5 mg  0.5 mg Nebulization Once Perez Ward MD         Facility-Administered Medications Ordered in Other Visits   Medication Dose Route Frequency Provider Last Rate Last Admin    iothalamate (CONRAY) 43 % injection 15 mL  15 mL IntraVENous ONCE PRN Timothy Harper MD           Allergies  Allergies have been reviewed. Louis Banks is allergic to cat hair extract, dog epithelium, and other. Social History  Louis Banks  reports that he has never smoked. He has never used smokeless tobacco. He reports current alcohol use of about 1.0 standard drink per week. He reports that he does not use drugs. Family History  Oziel's family history includes Alzheimer's Disease in his father; Arthritis in his father; Cancer in his mother; Heart Disease in his maternal grandfather and maternal grandmother; Parkinsonism in his father. Physical Exam:     Resp 16   Ht 5' 8\" (1.727 m)   Wt 187 lb (84.8 kg)   BMI 28.43 kg/m²    Constitutional: Patient is oriented to person, place, and time. Patient appears well-developed and well nourished. Mental Status/psychiatric: Behavior is normal. Thought content normal.  HENT: Negative otherwise noted  Head: Normocephalic and Atraumatic  Nose: Normal  Respiratory/Cardio: Effort normal. No respiratory distress. Shoulder:    Skin: Skin is warm and dry; no swelling or obvious muscular atrophy. Vasculature: 2+ radial pulses bilaterally  Neuro: Sensation grossly intact to light touch diffusely  Tenderness: Tender to palpation over the anterior aspect of the right shoulder. Nontender to palpation at the left shoulder.     ROM:

## 2023-06-08 ENCOUNTER — HOSPITAL ENCOUNTER (OUTPATIENT)
Dept: PHYSICAL THERAPY | Age: 68
Setting detail: THERAPIES SERIES
Discharge: HOME | End: 2023-06-08
Payer: MEDICARE

## 2023-06-08 PROCEDURE — 97110 THERAPEUTIC EXERCISES: CPT

## 2023-06-08 PROCEDURE — 97140 MANUAL THERAPY 1/> REGIONS: CPT

## 2023-06-08 NOTE — FLOWSHEET NOTE
due to improved radicular symptoms   Functional IR reaching to T12 to help with reaching into back pocket, abduction to 145 degrees to help with overhead activity  Improved shoulder strength to 4+/5 for all testing to allow improved general function     Patient goals: Reduce pain    Pt. Education:  [x] Yes  [] No  [x] Reviewed Prior HEP/Ed  Method of Education: [] Verbal  [] Demo  [] Written  Comprehension of Education:  [x] Verbalizes understanding. [] Demonstrates understanding. [] Needs review. [x] Demonstrates/verbalizes HEP/Ed previously given. Plan: [x] Continue current frequency toward long and short term goals.     [] Specific Instructions for subsequent treatments:       Frequency:  2 x/week for 16 visits     Time In: 9:06 am  Time Out: 9:46 am      Electronically signed by:  Florentin Trevino PTA    Unbilled Shoulder evalution, underlined sections, and objective testing completed with Xi Foreman, PT

## 2023-06-29 ENCOUNTER — OFFICE VISIT (OUTPATIENT)
Dept: NEUROSURGERY | Age: 68
End: 2023-06-29
Payer: MEDICARE

## 2023-06-29 ENCOUNTER — HOSPITAL ENCOUNTER (OUTPATIENT)
Dept: GENERAL RADIOLOGY | Facility: CLINIC | Age: 68
Discharge: HOME | End: 2023-07-01
Payer: MEDICARE

## 2023-06-29 ENCOUNTER — HOSPITAL ENCOUNTER (OUTPATIENT)
Facility: CLINIC | Age: 68
Discharge: HOME | End: 2023-07-01
Payer: MEDICARE

## 2023-06-29 VITALS
RESPIRATION RATE: 16 BRPM | OXYGEN SATURATION: 98 % | WEIGHT: 187 LBS | HEART RATE: 65 BPM | SYSTOLIC BLOOD PRESSURE: 131 MMHG | DIASTOLIC BLOOD PRESSURE: 75 MMHG | HEIGHT: 68 IN | BODY MASS INDEX: 28.34 KG/M2 | TEMPERATURE: 97 F

## 2023-06-29 DIAGNOSIS — M48.02 CERVICAL STENOSIS OF SPINAL CANAL: ICD-10-CM

## 2023-06-29 DIAGNOSIS — M48.02 CERVICAL STENOSIS OF SPINAL CANAL: Primary | ICD-10-CM

## 2023-06-29 PROCEDURE — 3078F DIAST BP <80 MM HG: CPT | Performed by: NEUROLOGICAL SURGERY

## 2023-06-29 PROCEDURE — 99214 OFFICE O/P EST MOD 30 MIN: CPT | Performed by: NEUROLOGICAL SURGERY

## 2023-06-29 PROCEDURE — G8427 DOCREV CUR MEDS BY ELIG CLIN: HCPCS | Performed by: NEUROLOGICAL SURGERY

## 2023-06-29 PROCEDURE — 1124F ACP DISCUSS-NO DSCNMKR DOCD: CPT | Performed by: NEUROLOGICAL SURGERY

## 2023-06-29 PROCEDURE — 3017F COLORECTAL CA SCREEN DOC REV: CPT | Performed by: NEUROLOGICAL SURGERY

## 2023-06-29 PROCEDURE — 1036F TOBACCO NON-USER: CPT | Performed by: NEUROLOGICAL SURGERY

## 2023-06-29 PROCEDURE — 3074F SYST BP LT 130 MM HG: CPT | Performed by: NEUROLOGICAL SURGERY

## 2023-06-29 PROCEDURE — 72040 X-RAY EXAM NECK SPINE 2-3 VW: CPT

## 2023-06-29 PROCEDURE — G8417 CALC BMI ABV UP PARAM F/U: HCPCS | Performed by: NEUROLOGICAL SURGERY

## 2023-07-05 ENCOUNTER — HOSPITAL ENCOUNTER (OUTPATIENT)
Dept: CT IMAGING | Age: 68
Discharge: HOME | End: 2023-07-07
Payer: MEDICARE

## 2023-07-05 DIAGNOSIS — M48.02 CERVICAL STENOSIS OF SPINAL CANAL: ICD-10-CM

## 2023-07-05 PROCEDURE — 72125 CT NECK SPINE W/O DYE: CPT

## 2023-07-10 ENCOUNTER — OFFICE VISIT (OUTPATIENT)
Dept: PAIN MANAGEMENT | Age: 68
End: 2023-07-10
Payer: MEDICARE

## 2023-07-10 VITALS — BODY MASS INDEX: 28.34 KG/M2 | WEIGHT: 187 LBS | HEIGHT: 68 IN

## 2023-07-10 DIAGNOSIS — M47.817 LUMBOSACRAL SPONDYLOSIS WITHOUT MYELOPATHY: Primary | ICD-10-CM

## 2023-07-10 DIAGNOSIS — M48.061 SPINAL STENOSIS OF LUMBAR REGION, UNSPECIFIED WHETHER NEUROGENIC CLAUDICATION PRESENT: ICD-10-CM

## 2023-07-10 DIAGNOSIS — M47.812 CERVICAL SPONDYLOSIS: ICD-10-CM

## 2023-07-10 PROCEDURE — G8417 CALC BMI ABV UP PARAM F/U: HCPCS | Performed by: PAIN MEDICINE

## 2023-07-10 PROCEDURE — G8427 DOCREV CUR MEDS BY ELIG CLIN: HCPCS | Performed by: PAIN MEDICINE

## 2023-07-10 PROCEDURE — 3017F COLORECTAL CA SCREEN DOC REV: CPT | Performed by: PAIN MEDICINE

## 2023-07-10 PROCEDURE — 99213 OFFICE O/P EST LOW 20 MIN: CPT | Performed by: PAIN MEDICINE

## 2023-07-10 PROCEDURE — 1124F ACP DISCUSS-NO DSCNMKR DOCD: CPT | Performed by: PAIN MEDICINE

## 2023-07-10 PROCEDURE — 1036F TOBACCO NON-USER: CPT | Performed by: PAIN MEDICINE

## 2023-07-10 ASSESSMENT — ENCOUNTER SYMPTOMS
BOWEL INCONTINENCE: 0
BACK PAIN: 1

## 2023-07-11 NOTE — DISCHARGE INSTRUCTIONS
[facility] and HYPERBARIC TREATMENT  CENTER                            Visit  Discharge Instructions / Physician Orders  DATE: 7/12/23     Home Care:NONE     SUPPLIES ORDERED THRU:          [address] [facility] [address]           DATE LAST SUPPLIED 4/25/26     Wound Location:  Right proximal lower leg stump x 2-HEALED     Cleanse with: Liquid antibacterial soap and water, rinse well      Dressing Orders:  Primary dressing      Moleskin to distal wound                   Frequency:  change as needed     Additional Orders: Increase protein to diet (meat, cheese, eggs, fish, peanut butter, nuts and beans)  Multivitamin daily     OFFLOADING [x] YES  TYPE:                  [] NA  Try not to put pressure on Right knee  Weekly wound care visits until determined otherwise. Antibiotic therapy-wound care related YES [] NO [] NA[x]     MY CHART []     Smart Device  []      HYPERBARIC TREATMENT-                TREATMENT #                          Your next appointment with the [address] [place] [address] is Call if needed                                                                                                   (Please note your next appointment above and if you are unable to keep, kindly give a 24 hour notice. Thank you.)  If more than 15 min late we cannot guarantee you will be seen due to clinician schedule  Per Policy, Excessive cancellation will call for dismissal from program.  If you experience any of the following, please call the [address] [place] [address] during business hours:  [phone]     * Increase in Pain  * Temperature over 101  * Increase in drainage from your wound  * Drainage with a foul odor  * Bleeding  * Increase in swelling  * Need for compression bandage changes due to slippage, breakthrough drainage. If you need medical attention outside of the business hours of the [address] [place] [address] please contact your PCP or go to the nearest emergency room.      The information contained in the After Visit Summary has
Coumadin/Warfarin - Follow-up monitoring.../Coumadin/Warfarin - Compliance.../Coumadin/Warfarin - Dietary Advice.../Coumadin/Warfarin - Potential for adverse drug reactions and interactions

## 2023-07-12 ENCOUNTER — HOSPITAL ENCOUNTER (OUTPATIENT)
Dept: WOUND CARE | Age: 68
Discharge: HOME | End: 2023-07-12
Payer: COMMERCIAL

## 2023-07-12 VITALS
RESPIRATION RATE: 16 BRPM | SYSTOLIC BLOOD PRESSURE: 130 MMHG | WEIGHT: 180 LBS | HEIGHT: 68 IN | TEMPERATURE: 97.3 F | BODY MASS INDEX: 27.28 KG/M2 | HEART RATE: 57 BPM | DIASTOLIC BLOOD PRESSURE: 84 MMHG

## 2023-07-12 DIAGNOSIS — Z89.511 S/P BELOW KNEE AMPUTATION, RIGHT (HCC): Primary | ICD-10-CM

## 2023-07-12 DIAGNOSIS — L97.212 NON-PRESSURE CHRONIC ULCER OF CALF WITH FAT LAYER EXPOSED, RIGHT (HCC): ICD-10-CM

## 2023-07-12 PROCEDURE — 99212 OFFICE O/P EST SF 10 MIN: CPT

## 2023-07-12 RX ORDER — GENTAMICIN SULFATE 1 MG/G
OINTMENT TOPICAL ONCE
Status: CANCELLED | OUTPATIENT
Start: 2023-07-12 | End: 2023-07-12

## 2023-07-12 RX ORDER — BACITRACIN ZINC AND POLYMYXIN B SULFATE 500; 1000 [USP'U]/G; [USP'U]/G
OINTMENT TOPICAL ONCE
Status: CANCELLED | OUTPATIENT
Start: 2023-07-12 | End: 2023-07-12

## 2023-07-12 RX ORDER — LIDOCAINE HYDROCHLORIDE 20 MG/ML
JELLY TOPICAL ONCE
Status: CANCELLED | OUTPATIENT
Start: 2023-07-12 | End: 2023-07-12

## 2023-07-12 RX ORDER — GINSENG 100 MG
CAPSULE ORAL ONCE
Status: CANCELLED | OUTPATIENT
Start: 2023-07-12 | End: 2023-07-12

## 2023-07-12 RX ORDER — LIDOCAINE HYDROCHLORIDE 40 MG/ML
SOLUTION TOPICAL ONCE
Status: DISCONTINUED | OUTPATIENT
Start: 2023-07-12 | End: 2023-07-12

## 2023-07-12 RX ORDER — BETAMETHASONE DIPROPIONATE 0.05 %
OINTMENT (GRAM) TOPICAL ONCE
Status: CANCELLED | OUTPATIENT
Start: 2023-07-12 | End: 2023-07-12

## 2023-07-12 RX ORDER — LIDOCAINE 50 MG/G
OINTMENT TOPICAL ONCE
Status: CANCELLED | OUTPATIENT
Start: 2023-07-12 | End: 2023-07-12

## 2023-07-12 RX ORDER — LIDOCAINE HYDROCHLORIDE 40 MG/ML
SOLUTION TOPICAL ONCE
Status: CANCELLED | OUTPATIENT
Start: 2023-07-12 | End: 2023-07-12

## 2023-07-12 RX ORDER — CLOBETASOL PROPIONATE 0.5 MG/G
OINTMENT TOPICAL ONCE
Status: CANCELLED | OUTPATIENT
Start: 2023-07-12 | End: 2023-07-12

## 2023-07-12 RX ORDER — IBUPROFEN 200 MG
TABLET ORAL ONCE
Status: CANCELLED | OUTPATIENT
Start: 2023-07-12 | End: 2023-07-12

## 2023-07-12 RX ORDER — LIDOCAINE 40 MG/G
CREAM TOPICAL ONCE
Status: CANCELLED | OUTPATIENT
Start: 2023-07-12 | End: 2023-07-12

## 2023-07-12 ASSESSMENT — ENCOUNTER SYMPTOMS
DIARRHEA: 0
RHINORRHEA: 0
COUGH: 0
NAUSEA: 0
SHORTNESS OF BREATH: 0
VOMITING: 0

## 2023-07-12 NOTE — PLAN OF CARE
Problem: Safety - Adult  Goal: Free from fall injury  Outcome: Completed     Problem: Wound:  Goal: Will show signs of wound healing; wound closure and no evidence of infection  Description: Will show signs of wound healing; wound closure and no evidence of infection  Outcome: Completed

## 2023-07-12 NOTE — PROGRESS NOTES
(cm^2) 0 cm^2 07/12/23 1311   Change in Wound Size % (l*w) 100 07/12/23 1311   Wound Volume (cm^3) 0 cm^3 07/12/23 1311   Wound Healing % 100 07/12/23 1311   Post-Procedure Length (cm) 0 cm 07/12/23 1311   Post-Procedure Width (cm) 0 cm 07/12/23 1311   Post-Procedure Depth (cm) 0 cm 07/12/23 1311   Post-Procedure Surface Area (cm^2) 0 cm^2 07/12/23 1311   Post-Procedure Volume (cm^3) 0 cm^3 07/12/23 1311   Wound Assessment Dry 07/12/23 1311   Drainage Amount Moderate 05/23/23 0841   Drainage Description Serosanguinous 05/23/23 0841   Odor None 05/23/23 0841   Domitila-wound Assessment Hyperkeratosis (callous); Maceration 05/23/23 0841   Margins Attached edges 05/23/23 0841   Wound Thickness Description not for Pressure Injury Full thickness 05/23/23 0841   Number of days: 78          Wound is healed    Plan:     Treatment Note please see Discharge Instructions    Written patient dismissal instructions given to patient and signed by patient or POA.            Electronically signed by LUCIE Montemayor CNP on 7/12/2023 at 1:43 PM

## 2023-07-20 ENCOUNTER — TELEPHONE (OUTPATIENT)
Dept: PHYSICAL MEDICINE AND REHAB | Age: 68
End: 2023-07-20

## 2023-07-20 RX ORDER — GABAPENTIN 300 MG/1
900 CAPSULE ORAL 3 TIMES DAILY
Qty: 270 CAPSULE | Refills: 2 | Status: SHIPPED | OUTPATIENT
Start: 2023-07-20 | End: 2023-10-18

## 2023-07-20 RX ORDER — GABAPENTIN 300 MG/1
CAPSULE ORAL
Qty: 270 CAPSULE | Refills: 2 | Status: CANCELLED | OUTPATIENT
Start: 2023-07-20

## 2023-07-20 NOTE — TELEPHONE ENCOUNTER
Mary Kate Ga called for refill of gabapentin. He said his bottle reads one more refill but the pharmacy said he doesn't have a refill left. He will need the medication by the weekend. He has f/u visit with you on 8/7    Thank you.

## 2023-08-03 ENCOUNTER — OFFICE VISIT (OUTPATIENT)
Dept: VASCULAR SURGERY | Age: 68
End: 2023-08-03

## 2023-08-03 VITALS
TEMPERATURE: 97.9 F | HEART RATE: 64 BPM | DIASTOLIC BLOOD PRESSURE: 70 MMHG | RESPIRATION RATE: 17 BRPM | WEIGHT: 192 LBS | OXYGEN SATURATION: 96 % | HEIGHT: 68 IN | BODY MASS INDEX: 29.1 KG/M2 | SYSTOLIC BLOOD PRESSURE: 113 MMHG

## 2023-08-03 DIAGNOSIS — Z89.511 S/P BELOW KNEE AMPUTATION, RIGHT (HCC): Primary | ICD-10-CM

## 2023-08-03 NOTE — PROGRESS NOTES
GCS eye subscore is 4. GCS verbal subscore is 5. GCS motor subscore is 6. Sensory: Sensation is intact. Motor: Motor function is intact. Psychiatric:         Mood and Affect: Mood normal.         Speech: Speech normal.         Behavior: Behavior normal.         Thought Content: Thought content normal.       August 2023 February 2023 November 2022 September 2022 August 31, 2022 bone stump resected          August 2022               Small blistering due to prosthesis, incision and drainage performed noting necrotic fat no purulance     November 2021           Imaging/Labs:     none    Assessment and Plan:     Right below knee amputation  Continue to work with prosthetists in any adjustments needed   He is doing very well from revision of his BKA standpoint  He will follow up as needed if there are any further issues or concerns    Electronically signed by Niko Cintron MD on 8/3/23 at 1:34 PM EDT      [address] [facility] [employer] Se: [phone]  C: [phone]  Email: [email or web address]

## 2023-08-04 ENCOUNTER — OFFICE VISIT (OUTPATIENT)
Dept: PAIN MANAGEMENT | Age: 68
End: 2023-08-04
Payer: MEDICARE

## 2023-08-04 VITALS — HEIGHT: 68 IN | BODY MASS INDEX: 29.1 KG/M2 | WEIGHT: 192 LBS

## 2023-08-04 DIAGNOSIS — M54.16 LUMBAR RADICULOPATHY: Primary | ICD-10-CM

## 2023-08-04 DIAGNOSIS — M51.36 LUMBAR DEGENERATIVE DISC DISEASE: ICD-10-CM

## 2023-08-04 DIAGNOSIS — M47.817 LUMBOSACRAL SPONDYLOSIS WITHOUT MYELOPATHY: ICD-10-CM

## 2023-08-04 DIAGNOSIS — M48.061 SPINAL STENOSIS OF LUMBAR REGION, UNSPECIFIED WHETHER NEUROGENIC CLAUDICATION PRESENT: ICD-10-CM

## 2023-08-04 PROCEDURE — 1036F TOBACCO NON-USER: CPT | Performed by: STUDENT IN AN ORGANIZED HEALTH CARE EDUCATION/TRAINING PROGRAM

## 2023-08-04 PROCEDURE — 99214 OFFICE O/P EST MOD 30 MIN: CPT | Performed by: STUDENT IN AN ORGANIZED HEALTH CARE EDUCATION/TRAINING PROGRAM

## 2023-08-04 PROCEDURE — 3017F COLORECTAL CA SCREEN DOC REV: CPT | Performed by: STUDENT IN AN ORGANIZED HEALTH CARE EDUCATION/TRAINING PROGRAM

## 2023-08-04 PROCEDURE — G8427 DOCREV CUR MEDS BY ELIG CLIN: HCPCS | Performed by: STUDENT IN AN ORGANIZED HEALTH CARE EDUCATION/TRAINING PROGRAM

## 2023-08-04 PROCEDURE — G8417 CALC BMI ABV UP PARAM F/U: HCPCS | Performed by: STUDENT IN AN ORGANIZED HEALTH CARE EDUCATION/TRAINING PROGRAM

## 2023-08-04 PROCEDURE — 1124F ACP DISCUSS-NO DSCNMKR DOCD: CPT | Performed by: STUDENT IN AN ORGANIZED HEALTH CARE EDUCATION/TRAINING PROGRAM

## 2023-08-04 NOTE — PROGRESS NOTES
spondylosis without myelopathy  M47.817       3. Spinal stenosis of lumbar region, unspecified whether neurogenic claudication present  M48.061       4. Lumbar degenerative disc disease  M51.36            ASSESSMENT:    Brien Cornejo is a 79 y.o.male who presents with chronic low back pain and left leg pain. To review, patient has had symptoms for the last year without injuries or trauma. He denies low back surgery. He has a history of right below-knee amputation and had a chronic wound at the stump which is now healed. The patient's history and physical examination are consistent with lumbar radiculopathy as the patient has pain starting in the low back radiating down into the left leg. Patient has positive neural tension signs on examination with a positive seated straight leg raise. Additionally the lumbar MRI on 10/13/2022 reveals multilevel degenerative changes with multilevel neuroforaminal stenosis most severe at L4-5 and L5-S1. Therefore, I will plan for a left lumbar L4 and L5 transforaminal epidural steroid injection. Neurologically, it appears the patient has full strength and normal sensation. There is no evidence of myelopathy on examination. There are no red flags in the patient's history. The patient has failed conservative measures including outpatient physical therapy, greater than 3 medications for pain relief, a self-directed therapy program, as well as activity modification all within the last 6 weeks over 3 months. The patient's pain is moderate to severe that causes functional deficit measured on pain and disability scale. The patient reports worsening quality of life. PLAN:  Medications:     For nonopioid therapy, the following medications were prescribed:    -Continue medication prescribed by primary care physician    Opioid therapy:  -Not indicated    Interventions:  -Plan for left lumbar L4 and L5 transforaminal epidural steroid injection  -Wound is fully healed and no longer

## 2023-08-04 NOTE — H&P (VIEW-ONLY)
Chronic Pain Clinic Note     Encounter Date: 8/4/2023     SUBJECTIVE:  Chief Complaint   Patient presents with    Back Pain    Neck Pain       History of Present Illness:   Darien Nuno is a 79 y.o. male who presents with neck and back pain    Medication Refill: n/a    Current Complaints of Pain:   Location: back and neck   Radiation: both legs  Severity: moderate  Pain Numerical Score - 3 today   Average:  5     Highest: 9  Lowest: 1  Character/Quality: Complains of pain that is cramping and shooting  Timing: Constant  Associated symptoms: none  Numbness: yes  Weakness: yes  Exacerbating factors:  laying down  Alleviating factors:  OTC meds  Length of time pain has been present: Started about 4/5 years ago  Inciting event/injury:  no  Bowel/Bladder incontinence: no  Falls: 1 fall a day  Physical Therapy: 06/08/2023 last visit    History of Interventions:   Surgery: No previous lumbar/cervical surgeries  Injections: None    Imaging:    MRI Lumbar 10/13/2022    Findings:     Lumbar vertebral body height and signal and alignment are normal.   Conus medullaris is unremarkable. At L1-2, there is mild degenerative disc disease. No disc herniation or spinal stenosis. At L2-3, there is degenerative disc disease. There is disc bulge and facet and ligamentous hypertrophy with mild canalicular narrowing. At L3-4, there is degenerative disc disease. There is disc bulge and facet and ligamentous hypertrophy with moderate spinal stenosis. There is left inferior foraminal narrowing. At L4-5, there is degenerative disc disease. There is disc bulge and facet and ligamentous hypertrophy with moderately severe spinal stenosis. Bilateral inferior foraminal narrowing. At L5-S1, there is mild degenerative disc disease. There is mild facet hypertrophy. No spinal stenosis.       Past Medical History:   Diagnosis Date    Arthritis     Chronic right shoulder pain     Chronic shoulder pain, left 2001    had surgery to

## 2023-08-07 ENCOUNTER — OFFICE VISIT (OUTPATIENT)
Dept: PHYSICAL MEDICINE AND REHAB | Age: 68
End: 2023-08-07

## 2023-08-07 VITALS
SYSTOLIC BLOOD PRESSURE: 120 MMHG | DIASTOLIC BLOOD PRESSURE: 70 MMHG | HEIGHT: 68 IN | HEART RATE: 56 BPM | WEIGHT: 189.8 LBS | TEMPERATURE: 98.3 F | BODY MASS INDEX: 28.76 KG/M2

## 2023-08-07 DIAGNOSIS — S81.801D WOUND OF RIGHT LOWER EXTREMITY, SUBSEQUENT ENCOUNTER: ICD-10-CM

## 2023-08-07 DIAGNOSIS — D36.13 NEUROMA OF RIGHT LOWER EXTREMITY: Primary | ICD-10-CM

## 2023-08-07 DIAGNOSIS — S88.111A TRAUMATIC BELOW-KNEE AMPUTATION OF RIGHT LOWER EXTREMITY WITH COMPLICATION, INITIAL ENCOUNTER (HCC): ICD-10-CM

## 2023-08-07 RX ORDER — DULOXETIN HYDROCHLORIDE 30 MG/1
30 CAPSULE, DELAYED RELEASE ORAL DAILY
Qty: 30 CAPSULE | Refills: 2 | Status: SHIPPED | OUTPATIENT
Start: 2023-08-07

## 2023-08-17 ASSESSMENT — ENCOUNTER SYMPTOMS
ALLERGIC/IMMUNOLOGIC NEGATIVE: 1
COLOR CHANGE: 0
SHORTNESS OF BREATH: 0
ABDOMINAL PAIN: 0
CHEST TIGHTNESS: 0

## 2023-08-19 ASSESSMENT — ENCOUNTER SYMPTOMS: BACK PAIN: 1

## 2023-08-19 NOTE — PROGRESS NOTES
[facility] PHYSICAL MEDICINE & REHABILITATION  [address]  [address] [address]  Dept: [phone]  Dept Fax: [phone]    Outpatient Followup Note    Beni Shelley is a 79y.o.-year-old male presenting for follow up of remote traumatic right below-knee amputation and neuroma. HPI:     He was last seen on 4/26/23 for follow up of remote right BKA and neuroma. Since that time, he reports doing okay. He states that he has been wearing his prosthesis about 12-14 hours per day and that it has been working well for him. He notes that he has been working outside in his garden. He continues to think that gabapentin and cymbalta are helping neuropathic pain, and he would like to continue these medications. He denies any side effects. He does state that the two areas where he had wounds on the right lower limb previously are starting to get irritated again. He was discharged from wound care on 7/12/23. Otherwise, he has seen pain management and neurosurgery for neck and low back pain. He states that he was having muscle cramping/spasms in the bilateral lower limbs as well. He has a lumbar epidural steroid injection scheduled on 8/28/23. Past Medical History:   Diagnosis Date    Arthritis     Chronic right shoulder pain     Chronic shoulder pain, left 2001    had surgery to repair    Environmental allergies     History of general anesthesia complication 2/52/0224    airway closed off after intubation after cervical neck surgery, was remedied with a counteracting medication    HLD (hyperlipidemia)     Hypertension     DARYL on CPAP 4/22/2015    Dr. Olivia Chambers.  Auto, 12-16cm of H20    Respiratory failure, post-operative ([address]) 5/16/2001    due to airway swelling obstruction after intubation due to certain medication    Rotator cuff tear, left 2014    Dr. Chelle Pate    Rotator cuff tear, right 4/27/2015    Dr. Chelle Pate,       Past Surgical History:

## 2023-08-28 ENCOUNTER — HOSPITAL ENCOUNTER (OUTPATIENT)
Dept: PAIN MANAGEMENT | Facility: CLINIC | Age: 68
Discharge: HOME | End: 2023-08-28
Payer: MEDICARE

## 2023-08-28 VITALS
TEMPERATURE: 96.8 F | WEIGHT: 189 LBS | HEIGHT: 68 IN | SYSTOLIC BLOOD PRESSURE: 155 MMHG | DIASTOLIC BLOOD PRESSURE: 94 MMHG | RESPIRATION RATE: 10 BRPM | OXYGEN SATURATION: 96 % | HEART RATE: 54 BPM | BODY MASS INDEX: 28.64 KG/M2

## 2023-08-28 DIAGNOSIS — R52 PAIN MANAGEMENT: ICD-10-CM

## 2023-08-28 PROCEDURE — 64483 NJX AA&/STRD TFRM EPI L/S 1: CPT

## 2023-08-28 PROCEDURE — 2500000003 HC RX 250 WO HCPCS: Performed by: STUDENT IN AN ORGANIZED HEALTH CARE EDUCATION/TRAINING PROGRAM

## 2023-08-28 PROCEDURE — 64484 NJX AA&/STRD TFRM EPI L/S EA: CPT | Performed by: STUDENT IN AN ORGANIZED HEALTH CARE EDUCATION/TRAINING PROGRAM

## 2023-08-28 PROCEDURE — 64484 NJX AA&/STRD TFRM EPI L/S EA: CPT

## 2023-08-28 PROCEDURE — 6360000004 HC RX CONTRAST MEDICATION: Performed by: STUDENT IN AN ORGANIZED HEALTH CARE EDUCATION/TRAINING PROGRAM

## 2023-08-28 PROCEDURE — 64483 NJX AA&/STRD TFRM EPI L/S 1: CPT | Performed by: STUDENT IN AN ORGANIZED HEALTH CARE EDUCATION/TRAINING PROGRAM

## 2023-08-28 PROCEDURE — 2580000003 HC RX 258: Performed by: STUDENT IN AN ORGANIZED HEALTH CARE EDUCATION/TRAINING PROGRAM

## 2023-08-28 PROCEDURE — 6360000002 HC RX W HCPCS: Performed by: STUDENT IN AN ORGANIZED HEALTH CARE EDUCATION/TRAINING PROGRAM

## 2023-08-28 RX ORDER — LIDOCAINE HYDROCHLORIDE 10 MG/ML
INJECTION, SOLUTION EPIDURAL; INFILTRATION; INTRACAUDAL; PERINEURAL
Status: COMPLETED | OUTPATIENT
Start: 2023-08-28 | End: 2023-08-28

## 2023-08-28 RX ORDER — DEXAMETHASONE SODIUM PHOSPHATE 10 MG/ML
INJECTION, SOLUTION INTRAMUSCULAR; INTRAVENOUS
Status: COMPLETED | OUTPATIENT
Start: 2023-08-28 | End: 2023-08-28

## 2023-08-28 RX ORDER — SODIUM CHLORIDE 0.9 % (FLUSH) 0.9 %
SYRINGE (ML) INJECTION
Status: COMPLETED | OUTPATIENT
Start: 2023-08-28 | End: 2023-08-28

## 2023-08-28 RX ADMIN — DEXAMETHASONE SODIUM PHOSPHATE 20 MG: 10 INJECTION, SOLUTION INTRAMUSCULAR; INTRAVENOUS at 08:43

## 2023-08-28 RX ADMIN — LIDOCAINE HYDROCHLORIDE 5 ML: 10 INJECTION, SOLUTION EPIDURAL; INFILTRATION; INTRACAUDAL at 08:44

## 2023-08-28 RX ADMIN — Medication 4 ML: at 08:43

## 2023-08-28 RX ADMIN — IOHEXOL 2 ML: 180 INJECTION INTRAVENOUS at 08:42

## 2023-08-28 ASSESSMENT — PAIN - FUNCTIONAL ASSESSMENT
PAIN_FUNCTIONAL_ASSESSMENT: 0-10
PAIN_FUNCTIONAL_ASSESSMENT: NONE - DENIES PAIN
PAIN_FUNCTIONAL_ASSESSMENT: PREVENTS OR INTERFERES WITH MANY ACTIVE NOT PASSIVE ACTIVITIES

## 2023-08-28 ASSESSMENT — PAIN DESCRIPTION - DESCRIPTORS: DESCRIPTORS: SHARP

## 2023-08-28 NOTE — OP NOTE
PROCEDURE PERFORMED: Left Lumbar Transforaminal Epidural Steroid Injection using Fluoroscopy    PREOPERATIVE DIAGNOSIS: Left lumbosacral radiculopathy    INDICATIONS: Left radicular leg pain    The patient's history and physical exam were reviewed. The risk, benefits, and alternatives of the procedure were discussed and all questions were answered to the patient's satisfaction. The patient agreed to proceed and written informed consent was obtained. POSTOPERATIVE DIAGNOSIS: Same    PHYSICIAN:  Dr. Yara Casiano DO    ANESTHESIA:  LOCAL    ASSISTANT:  NONE    PATHOLOGY:  NONE    ESTIMATED BLOOD LOSS:  N/A    IMPLANTS:  NONE    PROCEDURE DESCRIPTION: Left lumbar transforaminal epidural injection using fluoroscopy    Targeted levels: Left lumbar L4 and L5 transforaminal epidural space    The patient was placed on the operative bed in prone position. The area was prepped with  Chlorhexidine. The area was then draped in a sterile fashion. An AP fluoroscopic  film was taken to identify the L5 vertebral body, pedicle and superior articulating process. The skin and subcutaneous tissues were anesthetized with 1% lidocaine. Then a 22-gauge 3-1/2 inch Quincke spinal needle was advanced under fluoroscopic guidance using an oblique view just inferior to the pedicle at L5. Then utilizing AP and lateral fluoroscopic views, I confirmed the position of the needle tip to be within the neural foramen. After negative aspiration, Omnipaque was injected under AP view at each level. There was adequate contrast spread along the nerve root and in the epidural space. There was no evidence of intravascular uptake or intrathecal spread in imaging. At this point, after negative aspiration, a total volume of treatment injectate consisting of 1 mL of dexamethasone 10 mg/mL and 2 mL of normal saline preservative-free was easily injected. The needle was then removed. There was applied pressure to the needle insertion site.

## 2023-08-28 NOTE — INTERVAL H&P NOTE
Update History & Physical    The patient's History and Physical of August 4, 2023 was reviewed with the patient and I examined the patient. There was no change. The surgical site was confirmed by the patient and me. Plan: The risks, benefits, expected outcome, and alternative to the recommended procedure have been discussed with the patient. Patient understands and wants to proceed with the procedure.      Electronically signed by Cristian Sheikh DO on 8/28/2023 at 8:18 AM

## 2023-08-28 NOTE — DISCHARGE INSTRUCTIONS

## 2023-09-15 ENCOUNTER — OFFICE VISIT (OUTPATIENT)
Dept: PAIN MANAGEMENT | Age: 68
End: 2023-09-15
Payer: MEDICARE

## 2023-09-15 VITALS — HEIGHT: 68 IN | WEIGHT: 189 LBS | BODY MASS INDEX: 28.64 KG/M2

## 2023-09-15 DIAGNOSIS — M47.817 LUMBOSACRAL SPONDYLOSIS WITHOUT MYELOPATHY: ICD-10-CM

## 2023-09-15 DIAGNOSIS — M47.812 CERVICAL SPONDYLOSIS: ICD-10-CM

## 2023-09-15 DIAGNOSIS — M48.061 SPINAL STENOSIS OF LUMBAR REGION, UNSPECIFIED WHETHER NEUROGENIC CLAUDICATION PRESENT: ICD-10-CM

## 2023-09-15 DIAGNOSIS — M48.062 SPINAL STENOSIS OF LUMBAR REGION WITH NEUROGENIC CLAUDICATION: ICD-10-CM

## 2023-09-15 DIAGNOSIS — M54.16 LUMBAR RADICULOPATHY: Primary | ICD-10-CM

## 2023-09-15 PROCEDURE — 1036F TOBACCO NON-USER: CPT | Performed by: NURSE PRACTITIONER

## 2023-09-15 PROCEDURE — 99214 OFFICE O/P EST MOD 30 MIN: CPT | Performed by: NURSE PRACTITIONER

## 2023-09-15 PROCEDURE — 3017F COLORECTAL CA SCREEN DOC REV: CPT | Performed by: NURSE PRACTITIONER

## 2023-09-15 PROCEDURE — G8417 CALC BMI ABV UP PARAM F/U: HCPCS | Performed by: NURSE PRACTITIONER

## 2023-09-15 PROCEDURE — 1124F ACP DISCUSS-NO DSCNMKR DOCD: CPT | Performed by: NURSE PRACTITIONER

## 2023-09-15 PROCEDURE — G8427 DOCREV CUR MEDS BY ELIG CLIN: HCPCS | Performed by: NURSE PRACTITIONER

## 2023-09-15 ASSESSMENT — ENCOUNTER SYMPTOMS
CONSTIPATION: 0
BOWEL INCONTINENCE: 0
COUGH: 0
BACK PAIN: 1
SHORTNESS OF BREATH: 0
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
## 2023-10-31 RX ORDER — GABAPENTIN 300 MG/1
CAPSULE ORAL
Qty: 270 CAPSULE | Refills: 2 | Status: SHIPPED | OUTPATIENT
Start: 2023-10-31 | End: 2024-01-29

## 2023-11-10 ENCOUNTER — OFFICE VISIT (OUTPATIENT)
Dept: PAIN MANAGEMENT | Age: 68
End: 2023-11-10
Payer: MEDICARE

## 2023-11-10 VITALS — HEIGHT: 68 IN | WEIGHT: 186 LBS | BODY MASS INDEX: 28.19 KG/M2

## 2023-11-10 DIAGNOSIS — M47.817 LUMBOSACRAL SPONDYLOSIS WITHOUT MYELOPATHY: ICD-10-CM

## 2023-11-10 DIAGNOSIS — M54.16 LUMBAR RADICULOPATHY: Primary | ICD-10-CM

## 2023-11-10 DIAGNOSIS — M47.812 CERVICAL SPONDYLOSIS: ICD-10-CM

## 2023-11-10 DIAGNOSIS — M48.061 SPINAL STENOSIS OF LUMBAR REGION, UNSPECIFIED WHETHER NEUROGENIC CLAUDICATION PRESENT: ICD-10-CM

## 2023-11-10 PROCEDURE — G8417 CALC BMI ABV UP PARAM F/U: HCPCS | Performed by: STUDENT IN AN ORGANIZED HEALTH CARE EDUCATION/TRAINING PROGRAM

## 2023-11-10 PROCEDURE — 99213 OFFICE O/P EST LOW 20 MIN: CPT | Performed by: STUDENT IN AN ORGANIZED HEALTH CARE EDUCATION/TRAINING PROGRAM

## 2023-11-10 PROCEDURE — G8427 DOCREV CUR MEDS BY ELIG CLIN: HCPCS | Performed by: STUDENT IN AN ORGANIZED HEALTH CARE EDUCATION/TRAINING PROGRAM

## 2023-11-10 PROCEDURE — 1124F ACP DISCUSS-NO DSCNMKR DOCD: CPT | Performed by: STUDENT IN AN ORGANIZED HEALTH CARE EDUCATION/TRAINING PROGRAM

## 2023-11-10 PROCEDURE — 3017F COLORECTAL CA SCREEN DOC REV: CPT | Performed by: STUDENT IN AN ORGANIZED HEALTH CARE EDUCATION/TRAINING PROGRAM

## 2023-11-10 PROCEDURE — 1036F TOBACCO NON-USER: CPT | Performed by: STUDENT IN AN ORGANIZED HEALTH CARE EDUCATION/TRAINING PROGRAM

## 2023-11-10 PROCEDURE — G8484 FLU IMMUNIZE NO ADMIN: HCPCS | Performed by: STUDENT IN AN ORGANIZED HEALTH CARE EDUCATION/TRAINING PROGRAM

## 2023-11-10 NOTE — PROGRESS NOTES
extremities  No ankle clonus    Special Tests:  Lumbar facet loading is positive bilaterally  Seated straight leg raise is positive on left      DIAGNOSIS:    ICD-10-CM    1. Lumbar radiculopathy  M54.16       2. Lumbosacral spondylosis without myelopathy  M47.817       3. Spinal stenosis of lumbar region, unspecified whether neurogenic claudication present  M48.061       4. Cervical spondylosis  M47.812         ASSESSMENT:    Shirin Rojas is a 79 y.o.male who presents with chronic low back pain and left leg pain. To review, patient has had symptoms for the last year without injuries or trauma. He denies low back surgery. He has a history of right below-knee amputation and had a chronic wound at the stump which is now healed. The patient's history and physical examination are consistent with lumbar radiculopathy as the patient has pain starting in the low back radiating down into the left leg. Patient has positive neural tension signs on examination with a positive seated straight leg raise. Additionally the lumbar MRI on 10/13/2022 reveals multilevel degenerative changes with multilevel neuroforaminal stenosis most severe at L4-5 and L5-S1. He underwent left lumbar L4 and L5 transforaminal epidural steroid injection on 8/28/2023 with 80% improvement in pain and function. Since last visit, the patient will be seeing the neurosurgeon about his chronic neck pain. Neurologically, it appears the patient has full strength and normal sensation. There is no evidence of myelopathy on examination. There are no red flags in the patient's history. PLAN:  Medications:     For nonopioid therapy, the following medications were prescribed:    -Continue medication prescribed by primary care physician    Opioid therapy:  -Not indicated    Interventions:  -Status post left lumbar L4 and L5 transforaminal epidural steroid injection on 8/28/2023 with 80% improvement in pain and function    Imaging:  -No new

## 2023-11-14 ENCOUNTER — OFFICE VISIT (OUTPATIENT)
Dept: PHYSICAL MEDICINE AND REHAB | Age: 68
End: 2023-11-14

## 2023-11-14 ENCOUNTER — HOSPITAL ENCOUNTER (OUTPATIENT)
Dept: WOUND CARE | Age: 68
Discharge: HOME | End: 2023-11-14
Payer: COMMERCIAL

## 2023-11-14 ENCOUNTER — HOSPITAL ENCOUNTER (OUTPATIENT)
Age: 68
Discharge: HOME | End: 2023-11-14
Payer: MEDICARE

## 2023-11-14 VITALS
SYSTOLIC BLOOD PRESSURE: 118 MMHG | TEMPERATURE: 97.8 F | HEIGHT: 68 IN | BODY MASS INDEX: 27.89 KG/M2 | DIASTOLIC BLOOD PRESSURE: 78 MMHG | HEART RATE: 60 BPM | WEIGHT: 184 LBS

## 2023-11-14 VITALS
HEIGHT: 68 IN | HEART RATE: 69 BPM | SYSTOLIC BLOOD PRESSURE: 141 MMHG | RESPIRATION RATE: 20 BRPM | WEIGHT: 187 LBS | DIASTOLIC BLOOD PRESSURE: 62 MMHG | BODY MASS INDEX: 28.34 KG/M2 | TEMPERATURE: 97.4 F

## 2023-11-14 DIAGNOSIS — L89.893 PRESSURE ULCER OF RIGHT LEG, STAGE 3 (HCC): Primary | ICD-10-CM

## 2023-11-14 DIAGNOSIS — L84 CALLUS: ICD-10-CM

## 2023-11-14 DIAGNOSIS — S81.801D LEG WOUND, RIGHT, SUBSEQUENT ENCOUNTER: ICD-10-CM

## 2023-11-14 DIAGNOSIS — D36.13 NEUROMA OF RIGHT LOWER EXTREMITY: Primary | ICD-10-CM

## 2023-11-14 DIAGNOSIS — Z89.511 S/P BELOW KNEE AMPUTATION, RIGHT (HCC): ICD-10-CM

## 2023-11-14 DIAGNOSIS — S81.801A WOUND OF RIGHT LOWER EXTREMITY, INITIAL ENCOUNTER: ICD-10-CM

## 2023-11-14 DIAGNOSIS — S88.111A TRAUMATIC BELOW-KNEE AMPUTATION OF RIGHT LOWER EXTREMITY WITH COMPLICATION, INITIAL ENCOUNTER (HCC): ICD-10-CM

## 2023-11-14 LAB
ALBUMIN SERPL-MCNC: 4.4 G/DL (ref 3.5–5.2)
ALP SERPL-CCNC: 93 U/L (ref 40–129)
ALT SERPL-CCNC: 14 U/L (ref 5–41)
ANION GAP SERPL CALCULATED.3IONS-SCNC: 7 MMOL/L (ref 9–17)
AST SERPL-CCNC: 18 U/L
BILIRUB SERPL-MCNC: 0.6 MG/DL (ref 0.3–1.2)
BUN SERPL-MCNC: 12 MG/DL (ref 8–23)
CALCIUM SERPL-MCNC: 9.5 MG/DL (ref 8.6–10.4)
CHLORIDE SERPL-SCNC: 102 MMOL/L (ref 98–107)
CHOLEST SERPL-MCNC: 153 MG/DL
CHOLESTEROL/HDL RATIO: 4.5
CO2 SERPL-SCNC: 30 MMOL/L (ref 20–31)
CREAT SERPL-MCNC: 0.9 MG/DL (ref 0.7–1.2)
GFR SERPL CREATININE-BSD FRML MDRD: >60 ML/MIN/1.73M2
GLUCOSE SERPL-MCNC: 109 MG/DL (ref 70–99)
HDLC SERPL-MCNC: 34 MG/DL
LDLC SERPL CALC-MCNC: 90 MG/DL (ref 0–130)
POTASSIUM SERPL-SCNC: 4.3 MMOL/L (ref 3.7–5.3)
PROT SERPL-MCNC: 7.6 G/DL (ref 6.4–8.3)
PSA SERPL-MCNC: 0.2 NG/ML (ref 0–4)
SODIUM SERPL-SCNC: 139 MMOL/L (ref 135–144)
TRIGL SERPL-MCNC: 144 MG/DL
URATE SERPL-MCNC: 7.7 MG/DL (ref 3.4–7)

## 2023-11-14 PROCEDURE — G0103 PSA SCREENING: HCPCS

## 2023-11-14 PROCEDURE — 84550 ASSAY OF BLOOD/URIC ACID: CPT

## 2023-11-14 PROCEDURE — 80053 COMPREHEN METABOLIC PANEL: CPT

## 2023-11-14 PROCEDURE — 36415 COLL VENOUS BLD VENIPUNCTURE: CPT

## 2023-11-14 PROCEDURE — 99213 OFFICE O/P EST LOW 20 MIN: CPT

## 2023-11-14 PROCEDURE — 11042 DBRDMT SUBQ TIS 1ST 20SQCM/<: CPT

## 2023-11-14 PROCEDURE — 83036 HEMOGLOBIN GLYCOSYLATED A1C: CPT

## 2023-11-14 PROCEDURE — 11042 DBRDMT SUBQ TIS 1ST 20SQCM/<: CPT | Performed by: NURSE PRACTITIONER

## 2023-11-14 PROCEDURE — 80061 LIPID PANEL: CPT

## 2023-11-14 RX ORDER — DULOXETIN HYDROCHLORIDE 30 MG/1
30 CAPSULE, DELAYED RELEASE ORAL DAILY
Qty: 30 CAPSULE | Refills: 2 | OUTPATIENT
Start: 2023-11-14

## 2023-11-14 RX ORDER — LIDOCAINE HYDROCHLORIDE 40 MG/ML
SOLUTION TOPICAL ONCE
Status: DISCONTINUED | OUTPATIENT
Start: 2023-11-14 | End: 2023-11-15 | Stop reason: HOSPADM

## 2023-11-14 RX ORDER — LIDOCAINE HYDROCHLORIDE 20 MG/ML
JELLY TOPICAL ONCE
Status: CANCELLED | OUTPATIENT
Start: 2023-11-14 | End: 2023-11-14

## 2023-11-14 RX ORDER — LIDOCAINE HYDROCHLORIDE 20 MG/ML
JELLY TOPICAL ONCE
OUTPATIENT
Start: 2023-11-14 | End: 2023-11-14

## 2023-11-14 RX ORDER — LIDOCAINE HYDROCHLORIDE 40 MG/ML
SOLUTION TOPICAL ONCE
OUTPATIENT
Start: 2023-11-14 | End: 2023-11-14

## 2023-11-14 RX ORDER — GABAPENTIN 300 MG/1
900 CAPSULE ORAL 3 TIMES DAILY
Qty: 270 CAPSULE | Refills: 2 | Status: SHIPPED | OUTPATIENT
Start: 2023-11-14 | End: 2024-02-12

## 2023-11-14 ASSESSMENT — ENCOUNTER SYMPTOMS
VOMITING: 0
DIARRHEA: 0
COUGH: 0
SHORTNESS OF BREATH: 0
NAUSEA: 0
RHINORRHEA: 0

## 2023-11-14 ASSESSMENT — PAIN DESCRIPTION - ONSET: ONSET: ON-GOING

## 2023-11-14 ASSESSMENT — PAIN DESCRIPTION - ORIENTATION: ORIENTATION: RIGHT;LOWER

## 2023-11-14 ASSESSMENT — PAIN DESCRIPTION - LOCATION: LOCATION: LEG

## 2023-11-14 ASSESSMENT — PAIN DESCRIPTION - FREQUENCY: FREQUENCY: CONTINUOUS

## 2023-11-14 ASSESSMENT — PAIN SCALES - GENERAL: PAINLEVEL_OUTOF10: 5

## 2023-11-14 ASSESSMENT — PAIN DESCRIPTION - DESCRIPTORS: DESCRIPTORS: BURNING

## 2023-11-14 NOTE — PROGRESS NOTES
COLONOSCOPY N/A 10/24/2018    COLONOSCOPY WITH BIOPSY and snare polypectomy performed by Rodo Rahman MD at [address] [facility] Left 12/15/2016    Reconstuctive Joint    HAND TENDON SURGERY      repair    HEMORRHOID SURGERY      KNEE SURGERY Bilateral     removed bursa and trimmed cartilage    LEG AMPUTATION BELOW KNEE Right 08/31/2022    RIGHT BELOW KNEE AMPUTATION REVISION performed by Rose Prather MD at [address] [place] [address] Left 03/2014    Dr. Chanell Ojeda, open repair    ROTATOR CUFF REPAIR Right 04/27/2015    SPINE SURGERY      see cervical laminectomy    TUMOR REMOVAL Left 2015    Dr. Derek Craig begin tumor removed index finger       FAMILY HISTORY    Family History   Problem Relation Age of Onset    Cancer Mother         breast with mets to brain, bone, lymph    Parkinsonism Father     Arthritis Father     Alzheimer's Disease Father     Heart Disease Maternal Grandmother     Heart Disease Maternal Grandfather        SOCIAL HISTORY    Social History     Tobacco Use    Smoking status: Never     Passive exposure: Never    Smokeless tobacco: Never   Vaping Use    Vaping Use: Never used   Substance Use Topics    Alcohol use: Yes     Alcohol/week: 1.0 standard drink of alcohol     Types: 1 Glasses of wine per week     Comment: 1 glass of wine every 3 days    Drug use: No       ALLERGIES    Allergies   Allergen Reactions    Cat Hair Extract     Dog Epithelium     Other Rash     MD thinks he is allergic to something in the environment.        MEDICATIONS    Current Outpatient Medications on File Prior to Encounter   Medication Sig Dispense Refill    gabapentin (NEURONTIN) 300 MG capsule take 3 capsules by mouth three times a day 270 capsule 2    DULoxetine (CYMBALTA) 30 MG extended release capsule Take 1 capsule by mouth daily 30 capsule 2    Foot Care Products (SUPER MOLESKIN) PADS 1 application by Does not apply route as needed (APPLY TO AREA

## 2023-11-14 NOTE — DISCHARGE INSTRUCTIONS
me, the patient and/or responsible adult, by my health care provider(s). I had the opportunity to ask questions regarding this information.  I have elected to receive;      []After Visit Summary  [x]Comprehensive Discharge Instruction      Patient signature______________________________________Date:________  Electronically signed by Mushtaq Willis RN on 11/14/2023 at 1:01 PM   Electronically signed by LUCIE Tan CNP on 11/14/2023 at 1:07 PM

## 2023-11-14 NOTE — TELEPHONE ENCOUNTER
Pharmacy sent refill request for cymbalta. Pt was last seen 08.07.23  Next appt is today 11.14.23  Last refill given by the pharmacy was 11.13.23    This refill will be put on hold by the pharmacy until due.

## 2023-11-14 NOTE — PROGRESS NOTES
status:     Number of children: 4   Occupational History    Occupation: contractor   Tobacco Use    Smoking status: Never     Passive exposure: Never    Smokeless tobacco: Never   Vaping Use    Vaping Use: Never used   Substance and Sexual Activity    Alcohol use: Yes     Alcohol/week: 1.0 standard drink of alcohol     Types: 1 Glasses of wine per week     Comment: 1 glass of wine every 3 days    Drug use: No    Sexual activity: Yes     Partners: Female     Social Determinants of Health     Financial Resource Strain: Low Risk  (6/7/2021)    Overall Financial Resource Strain (CARDIA)     Difficulty of Paying Living Expenses: Not hard at all   Food Insecurity: No Food Insecurity (6/7/2021)    Hunger Vital Sign     Worried About Running Out of Food in the Last Year: Never true     Ran Out of Food in the Last Year: Never true       Allergies   Allergen Reactions    Cat Hair Extract     Dog Epithelium     Other Rash     MD thinks he is allergic to something in the environment. Current Outpatient Medications   Medication Sig Dispense Refill    gabapentin (NEURONTIN) 300 MG capsule Take 3 capsules by mouth 3 times daily for 90 days.  270 capsule 2    Foot Care Products (SUPER MOLESKIN) PADS 1 application by Does not apply route as needed (APPLY TO AREA AS NEEDED) 12 each 5    vardenafil (LEVITRA) 10 MG tablet Take 1 tablet by mouth daily as needed      atorvastatin (LIPITOR) 20 MG tablet       tadalafil (CIALIS) 10 MG tablet       metoprolol succinate (TOPROL XL) 25 MG extended release tablet take 1 tablet by mouth once daily 30 tablet 2    colchicine (COLCRYS) 0.6 MG tablet take 1 tablet by mouth once daily 30 tablet 3    ibuprofen (ADVIL;MOTRIN) 200 MG tablet Take 1 tablet by mouth every 6 hours as needed for Pain #1-2 PO QD PRN      sildenafil (VIAGRA) 100 MG tablet take 1 tablet by mouth if needed for ERECTILE DYSFUNCTION 30 tablet 0    UREA 10 HYDRATING 10 % cream apply topically as directed if needed for

## 2023-11-15 LAB
EST. AVERAGE GLUCOSE BLD GHB EST-MCNC: 105 MG/DL
HBA1C MFR BLD: 5.3 % (ref 4–6)

## 2023-11-19 NOTE — DISCHARGE INSTRUCTIONS
[facility] and HYPERBARIC TREATMENT  CENTER                            Visit  Discharge Instructions / Physician Orders  DATE: 11/22/23     Home Care:NONE     SUPPLIES ORDERED THRU:   [employer] IN                  DATE LAST SUPPLIED 11/14/23     Wound Location:  Right stump     Cleanse with: Liquid antibacterial soap and water, rinse well      Dressing Orders:  Primary dressing    Cami     Secondary dressing     Excell SAP 4x4                      secure with           x 30days     Frequency:  change Daily     Additional Orders: Increase protein to diet (meat, cheese, eggs, fish, peanut butter, nuts and beans)  Multivitamin daily     OFFLOADING [x] YES  TYPE:                  [] NA  Stay out of Prosthetic  Weekly wound care visits until determined otherwise. Antibiotic therapy-wound care related YES [] NO [x] NA[]     MY CHART []     Smart Device  []      HYPERBARIC TREATMENT-                TREATMENT #                          Your next appointment with the [address] [place] [address] is in 2 weeks                                                                                                   (Please note your next appointment above and if you are unable to keep, kindly give a 24 hour notice. Thank you.)  If more than 15 min late we cannot guarantee you will be seen due to clinician schedule  Per Policy, Excessive cancellation will call for dismissal from program.  If you experience any of the following, please call the [address] [place] [address] during business hours:  [phone]     * Increase in Pain  * Temperature over 101  * Increase in drainage from your wound  * Drainage with a foul odor  * Bleeding  * Increase in swelling  * Need for compression bandage changes due to slippage, breakthrough drainage. If you need medical attention outside of the business hours of the [address] [place] [address] please contact your PCP or go to the nearest emergency room.      The information contained in the After Visit

## 2023-11-22 ENCOUNTER — HOSPITAL ENCOUNTER (OUTPATIENT)
Dept: WOUND CARE | Age: 68
Discharge: HOME | End: 2023-11-22
Payer: COMMERCIAL

## 2023-11-22 VITALS
BODY MASS INDEX: 27.89 KG/M2 | HEART RATE: 58 BPM | TEMPERATURE: 97.4 F | SYSTOLIC BLOOD PRESSURE: 108 MMHG | WEIGHT: 184 LBS | RESPIRATION RATE: 18 BRPM | DIASTOLIC BLOOD PRESSURE: 48 MMHG | HEIGHT: 68 IN

## 2023-11-22 DIAGNOSIS — S81.801D LEG WOUND, RIGHT, SUBSEQUENT ENCOUNTER: Primary | ICD-10-CM

## 2023-11-22 DIAGNOSIS — L89.893 PRESSURE ULCER OF RIGHT LEG, STAGE 3 (HCC): ICD-10-CM

## 2023-11-22 DIAGNOSIS — L84 CALLUS: ICD-10-CM

## 2023-11-22 PROCEDURE — 11042 DBRDMT SUBQ TIS 1ST 20SQCM/<: CPT

## 2023-11-22 RX ORDER — LIDOCAINE HYDROCHLORIDE 40 MG/ML
SOLUTION TOPICAL ONCE
OUTPATIENT
Start: 2023-11-22 | End: 2023-11-22

## 2023-11-22 RX ORDER — LIDOCAINE HYDROCHLORIDE 40 MG/ML
SOLUTION TOPICAL ONCE
Status: COMPLETED | OUTPATIENT
Start: 2023-11-22 | End: 2023-11-22

## 2023-11-22 RX ORDER — LIDOCAINE HYDROCHLORIDE 20 MG/ML
JELLY TOPICAL ONCE
OUTPATIENT
Start: 2023-11-22 | End: 2023-11-22

## 2023-11-22 RX ADMIN — LIDOCAINE HYDROCHLORIDE 5 ML: 40 SOLUTION TOPICAL at 10:53

## 2023-11-22 ASSESSMENT — ENCOUNTER SYMPTOMS
VOMITING: 0
COUGH: 0
NAUSEA: 0
RHINORRHEA: 0
SHORTNESS OF BREATH: 0
DIARRHEA: 0

## 2023-11-22 ASSESSMENT — PAIN DESCRIPTION - LOCATION: LOCATION: LEG

## 2023-11-22 ASSESSMENT — PAIN SCALES - GENERAL: PAINLEVEL_OUTOF10: 2

## 2023-11-22 ASSESSMENT — PAIN DESCRIPTION - ORIENTATION: ORIENTATION: RIGHT;LOWER

## 2023-11-22 ASSESSMENT — PAIN DESCRIPTION - FREQUENCY: FREQUENCY: CONTINUOUS

## 2023-11-22 ASSESSMENT — PAIN DESCRIPTION - DESCRIPTORS: DESCRIPTORS: BURNING

## 2023-11-22 ASSESSMENT — PAIN DESCRIPTION - ONSET: ONSET: ON-GOING

## 2023-11-22 NOTE — PROGRESS NOTES
[address] [place] [address]   Progress Note and Procedure Note      Brenda Nix  MEDICAL RECORD NUMBER:  [id number]  AGE: 79 y.o. GENDER: male  : 1955  EPISODE DATE:  2023    Subjective:     Chief Complaint   Patient presents with    Wound Check     Right BKA Wound         HISTORY of PRESENT ILLNESS HPI     Brenda Nix is a 79 y.o. male who presents today for wound/ulcer evaluation. History of Wound Context: here to follow up on right leg wound that has improved. Wound/Ulcer Pain Timing/Severity: none  Quality of pain: N/A  Severity:  0 / 10   Modifying Factors: None  Associated Signs/Symptoms: none    Ulcer Identification:  Ulcer Type: pressure  Contributing Factors: chronic pressure, decreased mobility, and shear force         PAST MEDICAL HISTORY        Diagnosis Date    Arthritis     Chronic right shoulder pain     Chronic shoulder pain, left     had surgery to repair    Environmental allergies     History of general anesthesia complication 3/59/2339    airway closed off after intubation after cervical neck surgery, was remedied with a counteracting medication    HLD (hyperlipidemia)     Hypertension     DARYL on CPAP 2015    Dr. Severiano Hippo.  Auto, 12-16cm of H20    Respiratory failure, post-operative ([address]) 2001    due to airway swelling obstruction after intubation due to certain medication    Rotator cuff tear, left     Dr. Kendall Benavides cuff tear, right 2015    Dr. Polina Covarrubias,        PAST SURGICAL HISTORY    Past Surgical History:   Procedure Laterality Date    AMPUTATION Right     right below knee amputation, due to industrial accident    CARPAL TUNNEL RELEASE Left 2013    CARPAL TUNNEL RELEASE Right 2016    CERVICAL FUSION      C-spine fusion times 3 levels    CERVICAL LAMINECTOMY  2013    C 4-5-6-7    COLONOSCOPY  2006    COLONOSCOPY N/A 10/24/2018    COLONOSCOPY WITH BIOPSY and snare polypectomy performed by Alfredo Sawyer MD at

## 2023-12-06 NOTE — DISCHARGE INSTRUCTIONS
[facility] and HYPERBARIC TREATMENT  CENTER                            Visit  Discharge Instructions / Physician Orders  DATE: 12/7/23     Home Care:NONE     SUPPLIES ORDERED THRU:   [email or web address] IN                  DATE LAST SUPPLIED 11/14/23     Wound Location:  Right stump     Cleanse with: Liquid antibacterial soap and water, rinse well      Dressing Orders:  Primary dressing    use moleskin to relileve friction                     secure with           x 30days     Frequency:  change Daily     Additional Orders: Increase protein to diet (meat, cheese, eggs, fish, peanut butter, nuts and beans)  Multivitamin daily     OFFLOADING [x] YES  TYPE:                  [] NA  Stay out of Prosthetic  Weekly wound care visits until determined otherwise. Antibiotic therapy-wound care related YES [] NO [x] NA[]     MY CHART []     Smart Device  []      HYPERBARIC TREATMENT-                TREATMENT #                          Your next appointment with the [address] [place] [address] is as needed                                                                                                   (Please note your next appointment above and if you are unable to keep, kindly give a 24 hour notice. Thank you.)  If more than 15 min late we cannot guarantee you will be seen due to clinician schedule  Per Policy, Excessive cancellation will call for dismissal from program.  If you experience any of the following, please call the [address] [place] [address] during business hours:  [phone]     * Increase in Pain  * Temperature over 101  * Increase in drainage from your wound  * Drainage with a foul odor  * Bleeding  * Increase in swelling  * Need for compression bandage changes due to slippage, breakthrough drainage. If you need medical attention outside of the business hours of the [address] [place] [address] please contact your PCP or go to the nearest emergency room.      The information contained in the After Visit Summary has been

## 2023-12-07 ENCOUNTER — HOSPITAL ENCOUNTER (OUTPATIENT)
Dept: WOUND CARE | Age: 68
Discharge: HOME | End: 2023-12-07
Payer: COMMERCIAL

## 2023-12-07 VITALS
WEIGHT: 184 LBS | SYSTOLIC BLOOD PRESSURE: 129 MMHG | HEIGHT: 68 IN | RESPIRATION RATE: 18 BRPM | TEMPERATURE: 97.7 F | HEART RATE: 60 BPM | BODY MASS INDEX: 27.89 KG/M2 | DIASTOLIC BLOOD PRESSURE: 63 MMHG

## 2023-12-07 DIAGNOSIS — L84 CALLUS: Primary | ICD-10-CM

## 2023-12-07 DIAGNOSIS — L89.893 PRESSURE ULCER OF RIGHT LEG, STAGE 3 (HCC): ICD-10-CM

## 2023-12-07 DIAGNOSIS — S81.801D LEG WOUND, RIGHT, SUBSEQUENT ENCOUNTER: ICD-10-CM

## 2023-12-07 PROCEDURE — 99212 OFFICE O/P EST SF 10 MIN: CPT | Performed by: NURSE PRACTITIONER

## 2023-12-07 PROCEDURE — 99212 OFFICE O/P EST SF 10 MIN: CPT

## 2023-12-07 RX ORDER — LIDOCAINE HYDROCHLORIDE 20 MG/ML
JELLY TOPICAL ONCE
Status: CANCELLED | OUTPATIENT
Start: 2023-12-07 | End: 2023-12-07

## 2023-12-07 RX ORDER — LIDOCAINE HYDROCHLORIDE 40 MG/ML
SOLUTION TOPICAL ONCE
Status: CANCELLED | OUTPATIENT
Start: 2023-12-07 | End: 2023-12-07

## 2023-12-07 RX ORDER — LIDOCAINE HYDROCHLORIDE 40 MG/ML
SOLUTION TOPICAL ONCE
Status: COMPLETED | OUTPATIENT
Start: 2023-12-07 | End: 2023-12-07

## 2023-12-07 RX ADMIN — LIDOCAINE HYDROCHLORIDE 5 ML: 40 SOLUTION TOPICAL at 09:24

## 2023-12-07 ASSESSMENT — PAIN DESCRIPTION - ONSET: ONSET: ON-GOING

## 2023-12-07 ASSESSMENT — ENCOUNTER SYMPTOMS
SHORTNESS OF BREATH: 0
NAUSEA: 0
DIARRHEA: 0
COUGH: 0
RHINORRHEA: 0
VOMITING: 0

## 2023-12-07 ASSESSMENT — PAIN SCALES - GENERAL: PAINLEVEL_OUTOF10: 4

## 2023-12-07 ASSESSMENT — PAIN DESCRIPTION - ORIENTATION: ORIENTATION: RIGHT;LOWER

## 2023-12-07 ASSESSMENT — PAIN DESCRIPTION - DESCRIPTORS: DESCRIPTORS: BURNING

## 2023-12-07 ASSESSMENT — PAIN DESCRIPTION - LOCATION: LOCATION: LEG

## 2023-12-07 ASSESSMENT — PAIN DESCRIPTION - PAIN TYPE: TYPE: CHRONIC PAIN

## 2023-12-07 ASSESSMENT — PAIN - FUNCTIONAL ASSESSMENT: PAIN_FUNCTIONAL_ASSESSMENT: PREVENTS OR INTERFERES SOME ACTIVE ACTIVITIES AND ADLS

## 2023-12-07 ASSESSMENT — PAIN DESCRIPTION - FREQUENCY: FREQUENCY: CONTINUOUS

## 2024-02-20 ENCOUNTER — OFFICE VISIT (OUTPATIENT)
Dept: PHYSICAL MEDICINE AND REHAB | Age: 69
End: 2024-02-20

## 2024-02-20 VITALS
HEIGHT: 68 IN | BODY MASS INDEX: 29.55 KG/M2 | WEIGHT: 195 LBS | HEART RATE: 60 BPM | SYSTOLIC BLOOD PRESSURE: 118 MMHG | DIASTOLIC BLOOD PRESSURE: 80 MMHG

## 2024-02-20 DIAGNOSIS — S88.111A TRAUMATIC BELOW-KNEE AMPUTATION OF RIGHT LOWER EXTREMITY WITH COMPLICATION, INITIAL ENCOUNTER (HCC): ICD-10-CM

## 2024-02-20 DIAGNOSIS — D36.13 NEUROMA OF RIGHT LOWER EXTREMITY: Primary | ICD-10-CM

## 2024-03-22 ENCOUNTER — OFFICE VISIT (OUTPATIENT)
Dept: PAIN MANAGEMENT | Age: 69
End: 2024-03-22
Payer: MEDICARE

## 2024-03-22 VITALS — HEIGHT: 68 IN | WEIGHT: 195 LBS | BODY MASS INDEX: 29.55 KG/M2

## 2024-03-22 DIAGNOSIS — M47.817 LUMBOSACRAL SPONDYLOSIS WITHOUT MYELOPATHY: ICD-10-CM

## 2024-03-22 DIAGNOSIS — M48.061 SPINAL STENOSIS OF LUMBAR REGION, UNSPECIFIED WHETHER NEUROGENIC CLAUDICATION PRESENT: ICD-10-CM

## 2024-03-22 DIAGNOSIS — M54.16 LUMBAR RADICULOPATHY: Primary | ICD-10-CM

## 2024-03-22 PROCEDURE — G8484 FLU IMMUNIZE NO ADMIN: HCPCS | Performed by: STUDENT IN AN ORGANIZED HEALTH CARE EDUCATION/TRAINING PROGRAM

## 2024-03-22 PROCEDURE — 1036F TOBACCO NON-USER: CPT | Performed by: STUDENT IN AN ORGANIZED HEALTH CARE EDUCATION/TRAINING PROGRAM

## 2024-03-22 PROCEDURE — 1124F ACP DISCUSS-NO DSCNMKR DOCD: CPT | Performed by: STUDENT IN AN ORGANIZED HEALTH CARE EDUCATION/TRAINING PROGRAM

## 2024-03-22 PROCEDURE — 3017F COLORECTAL CA SCREEN DOC REV: CPT | Performed by: STUDENT IN AN ORGANIZED HEALTH CARE EDUCATION/TRAINING PROGRAM

## 2024-03-22 PROCEDURE — G8417 CALC BMI ABV UP PARAM F/U: HCPCS | Performed by: STUDENT IN AN ORGANIZED HEALTH CARE EDUCATION/TRAINING PROGRAM

## 2024-03-22 PROCEDURE — G8427 DOCREV CUR MEDS BY ELIG CLIN: HCPCS | Performed by: STUDENT IN AN ORGANIZED HEALTH CARE EDUCATION/TRAINING PROGRAM

## 2024-03-22 PROCEDURE — 99214 OFFICE O/P EST MOD 30 MIN: CPT | Performed by: STUDENT IN AN ORGANIZED HEALTH CARE EDUCATION/TRAINING PROGRAM

## 2024-03-22 ASSESSMENT — ENCOUNTER SYMPTOMS
BOWEL INCONTINENCE: 0
BACK PAIN: 1

## 2024-03-22 NOTE — PROGRESS NOTES
HPI:     Back Pain  This is a chronic problem. The current episode started more than 1 year ago. The problem occurs constantly. The problem is unchanged. The pain is present in the lumbar spine. The quality of the pain is described as shooting and aching. The pain radiates to the left foot and right knee. The pain is at a severity of 2/10. The pain is mild. The pain is The same all the time. The symptoms are aggravated by twisting, standing, position, sitting, bending and lying down. Stiffness is present All day. Pertinent negatives include no bladder incontinence or bowel incontinence. He has tried home exercises, bed rest, walking, heat, ice, muscle relaxant, NSAIDs and analgesics for the symptoms.       ***    Pain ranges from a 2/10 to a 7/10 depending on activity.    Patient denies any new neurological symptoms. Nobowel or bladder incontinence, no weakness, and no falling.    Review of [place] does not show any aberrant prescription behavior. Medication is helping the patient stay active. Patient denies any side effects and reports adequate analgesia. No sign of misuse/abuse.    Past Medical History:   Diagnosis Date    Arthritis     Chronic right shoulder pain     Chronic shoulder pain, left 2001    had surgery to repair    Environmental allergies     History of general anesthesia complication 5/16/2001    airway closed off after intubation after cervical neck surgery, was remedied with a counteracting medication    HLD (hyperlipidemia)     Hypertension     DARYL on CPAP 4/22/2015    Dr. Nance. Auto, 12-16cm of H20    Respiratory failure, post-operative (HCC) 5/16/2001    due to airway swelling obstruction after intubation due to certain medication    Rotator cuff tear, left 2014    Dr. Ware    Rotator cuff tear, right 4/27/2015    Dr. Ware,        Past Surgical History:   Procedure Laterality Date    AMPUTATION Right     right below knee amputation, due to industrial accident    CARPAL TUNNEL RELEASE Left 
2001    had surgery to repair    Environmental allergies     History of general anesthesia complication 5/16/2001    airway closed off after intubation after cervical neck surgery, was remedied with a counteracting medication    HLD (hyperlipidemia)     Hypertension     DARYL on CPAP 4/22/2015    Dr. Nance. Auto, 12-16cm of H20    Respiratory failure, post-operative (HCC) 5/16/2001    due to airway swelling obstruction after intubation due to certain medication    Rotator cuff tear, left 2014    Dr. Ware    Rotator cuff tear, right 4/27/2015    Dr. Ware,        Past Surgical History:   Procedure Laterality Date    AMPUTATION Right     right below knee amputation, due to industrial accident    CARPAL TUNNEL RELEASE Left 01/2013    CARPAL TUNNEL RELEASE Right 08/03/2016    CERVICAL FUSION      C-spine fusion times 3 levels    CERVICAL LAMINECTOMY  01/2013    C 4-5-6-7    COLONOSCOPY  04/07/2006    COLONOSCOPY N/A 10/24/2018    COLONOSCOPY WITH BIOPSY and snare polypectomy performed by Fercho Ramos MD at [facility] [place] OR    FOOT SURGERY      HAND SURGERY Left 12/15/2016    Reconstuctive Joint    HAND TENDON SURGERY      repair    HEMORRHOID SURGERY      KNEE SURGERY Bilateral     removed bursa and trimmed cartilage    LEG AMPUTATION BELOW KNEE Right 08/31/2022    RIGHT BELOW KNEE AMPUTATION REVISION performed by Josef Jacobson MD at [facility] CV[facility]    PAIN MANAGEMENT PROCEDURE      ROTATOR CUFF REPAIR Left 03/2014    Dr. Ware, open repair    ROTATOR CUFF REPAIR Right 04/27/2015    SPINE SURGERY      see cervical laminectomy    TUMOR REMOVAL Left 2015    Dr. Natalie allan tumor removed index finger       Family History   Problem Relation Age of Onset    Cancer Mother         breast with mets to brain, bone, lymph    Parkinsonism Father     Arthritis Father     Alzheimer's Disease Father     Heart Disease Maternal Grandmother     Heart Disease Maternal Grandfather        Social History     Socioeconomic History    Marital

## 2024-04-22 RX ORDER — GABAPENTIN 300 MG/1
900 CAPSULE ORAL 3 TIMES DAILY
Qty: 270 CAPSULE | Refills: 5 | Status: SHIPPED | OUTPATIENT
Start: 2024-04-22 | End: 2024-10-19

## 2024-04-22 NOTE — TELEPHONE ENCOUNTER
Pt has been trying to move out of [place] but has not done that yet.  Still working on it.  Pharmacy gave last rx on 03.22.24

## 2024-04-22 NOTE — TELEPHONE ENCOUNTER
Pharmacy sent request for refill of gabapentin 300 mg/ 3 caps 3x day.    Last filled by the pharmacy on 03.22.24    Last seen by  03.03.24  Next fu is 05.20.24

## 2024-04-30 ENCOUNTER — HOSPITAL ENCOUNTER (OUTPATIENT)
Dept: PAIN MANAGEMENT | Facility: CLINIC | Age: 69
Discharge: HOME | End: 2024-04-30
Payer: MEDICARE

## 2024-04-30 VITALS
DIASTOLIC BLOOD PRESSURE: 77 MMHG | WEIGHT: 185 LBS | OXYGEN SATURATION: 95 % | HEART RATE: 55 BPM | BODY MASS INDEX: 28.04 KG/M2 | TEMPERATURE: 98.7 F | HEIGHT: 68 IN | SYSTOLIC BLOOD PRESSURE: 128 MMHG | RESPIRATION RATE: 11 BRPM

## 2024-04-30 DIAGNOSIS — R52 PAIN MANAGEMENT: ICD-10-CM

## 2024-04-30 PROCEDURE — 2500000003 HC RX 250 WO HCPCS: Performed by: STUDENT IN AN ORGANIZED HEALTH CARE EDUCATION/TRAINING PROGRAM

## 2024-04-30 PROCEDURE — 6360000002 HC RX W HCPCS: Performed by: STUDENT IN AN ORGANIZED HEALTH CARE EDUCATION/TRAINING PROGRAM

## 2024-04-30 PROCEDURE — 64483 NJX AA&/STRD TFRM EPI L/S 1: CPT

## 2024-04-30 PROCEDURE — 64483 NJX AA&/STRD TFRM EPI L/S 1: CPT | Performed by: STUDENT IN AN ORGANIZED HEALTH CARE EDUCATION/TRAINING PROGRAM

## 2024-04-30 PROCEDURE — 6360000004 HC RX CONTRAST MEDICATION: Performed by: STUDENT IN AN ORGANIZED HEALTH CARE EDUCATION/TRAINING PROGRAM

## 2024-04-30 PROCEDURE — 2580000003 HC RX 258: Performed by: STUDENT IN AN ORGANIZED HEALTH CARE EDUCATION/TRAINING PROGRAM

## 2024-04-30 RX ORDER — SODIUM CHLORIDE 0.9 % (FLUSH) 0.9 %
SYRINGE (ML) INJECTION
Status: COMPLETED | OUTPATIENT
Start: 2024-04-30 | End: 2024-04-30

## 2024-04-30 RX ORDER — DEXAMETHASONE SODIUM PHOSPHATE 10 MG/ML
INJECTION, SOLUTION INTRAMUSCULAR; INTRAVENOUS
Status: COMPLETED | OUTPATIENT
Start: 2024-04-30 | End: 2024-04-30

## 2024-04-30 RX ORDER — LIDOCAINE HYDROCHLORIDE 10 MG/ML
INJECTION, SOLUTION EPIDURAL; INFILTRATION; INTRACAUDAL; PERINEURAL
Status: COMPLETED | OUTPATIENT
Start: 2024-04-30 | End: 2024-04-30

## 2024-04-30 RX ADMIN — DEXAMETHASONE SODIUM PHOSPHATE 20 MG: 10 INJECTION, SOLUTION INTRAMUSCULAR; INTRAVENOUS at 16:12

## 2024-04-30 RX ADMIN — Medication 4 ML: at 16:11

## 2024-04-30 RX ADMIN — IOHEXOL 3 ML: 180 INJECTION INTRAVENOUS at 16:10

## 2024-04-30 RX ADMIN — LIDOCAINE HYDROCHLORIDE 5 ML: 10 INJECTION, SOLUTION EPIDURAL; INFILTRATION; INTRACAUDAL at 16:09

## 2024-04-30 ASSESSMENT — PAIN DESCRIPTION - DESCRIPTORS: DESCRIPTORS: ACHING;SHARP

## 2024-04-30 ASSESSMENT — PAIN - FUNCTIONAL ASSESSMENT
PAIN_FUNCTIONAL_ASSESSMENT: 0-10
PAIN_FUNCTIONAL_ASSESSMENT: 0-10

## 2024-04-30 NOTE — H&P
(NEURONTIN) 900 mg, Oral, 3 TIMES DAILY    ibuprofen (ADVIL;MOTRIN) 200 mg, Oral, EVERY 6 HOURS PRN, #1-2 PO QD PRN     metoprolol succinate (TOPROL XL) 25 MG extended release tablet take 1 tablet by mouth once daily    sildenafil (VIAGRA) 100 MG tablet take 1 tablet by mouth if needed for ERECTILE DYSFUNCTION    tadalafil (CIALIS) 10 MG tablet     UREA 10 HYDRATING 10 % cream apply topically as directed if needed for DRY SKIN (CALLUS) DAILY    vardenafil (LEVITRA) 10 mg, DAILY PRN       Allergies   Allergen Reactions    Cat Hair Extract     Dog Epithelium     Other Rash     MD thinks he is allergic to something in the environment.       Review of Systems:   Focused review of systems was performed, and negative as pertinent to diagnosis, except as stated in HPI.      Physical Exam  Constitutional:       Appearance: Normal appearance.   Pulmonary:      Effort: Pulmonary effort is normal.   Neurological:      Mental Status: alert.   Psychiatric:         Attention and Perception: Attention and perception normal.         Mood and Affect: Mood and affect normal.   Cardiovascular:      Rate: Normal rate.       Patient's current physical status, medications, medical history, and HPI have been reviewed and updated as appropriate on this date: 04/30/24    Risk/Benefit(s): The risks, benefits, alternatives, and potential complications have been discussed with the patient/family and informed consent has been obtained for the procedure/sedation.    Diagnosis:   Lumbar radiculopathy      Plan: Bilateral lumbar L5 transforaminal epidural steroid injection      ASA CLASSIFICATION  2  MP   CLASSIFICATION  2    Kvng Escoto DO

## 2024-04-30 NOTE — OP NOTE
PROCEDURE PERFORMED: Bilateral Lumbar Transforaminal Epidural Steroid Injection using Fluoroscopy    PREOPERATIVE DIAGNOSIS: Bilateral lumbosacral radiculopathy    INDICATIONS: Bilateral radicular leg pain    The patient's history and physical exam were reviewed.  The risk, benefits, and alternatives of the procedure were discussed and all questions were answered to the patient's satisfaction.  The patient agreed to proceed and written informed consent was obtained.    POSTOPERATIVE DIAGNOSIS: Same    PHYSICIAN:  Dr. Kvng Escoto DO    ANESTHESIA:  LOCAL    ASSISTANT:  NONE    PATHOLOGY:  NONE    ESTIMATED BLOOD LOSS:  N/A    IMPLANTS:  NONE    PROCEDURE DESCRIPTION: Bilateral lumbar transforaminal epidural injection using fluoroscopy    Targeted levels: Bilateral lumbar L5 transforaminal epidural space    The patient was placed on the operative bed in prone position.  The area was prepped with  Chlorhexidine.  The area was then draped in a sterile fashion.  An AP fluoroscopic  film was taken to identify the L5 vertebral body, pedicle and superior articulating process.  The skin and subcutaneous tissues were anesthetized with 1% lidocaine.  Then a 22-gauge 3-1/2 inch Quincke spinal needle was advanced under fluoroscopic guidance using an oblique view just inferior to the pedicle at L5.  Then utilizing AP and lateral fluoroscopic views, I confirmed the position of the needle tip to be within the neural foramen.  After negative aspiration, Omnipaque was injected under AP view at each level.  There was adequate contrast spread along the nerve root and in the epidural space.  There was no evidence of intravascular uptake or intrathecal spread in imaging.  At this point, after negative aspiration, a total volume of treatment injectate consisting of 1 mL of dexamethasone 10 mg/mL and 2 mL of normal saline preservative-free was easily injected.  The needle was then removed.  There was applied pressure to the needle

## 2024-04-30 NOTE — DISCHARGE INSTRUCTIONS

## 2024-05-10 ENCOUNTER — OFFICE VISIT (OUTPATIENT)
Dept: PAIN MANAGEMENT | Age: 69
End: 2024-05-10
Payer: MEDICARE

## 2024-05-10 VITALS — HEIGHT: 68 IN | WEIGHT: 185 LBS | BODY MASS INDEX: 28.04 KG/M2

## 2024-05-10 DIAGNOSIS — M48.061 SPINAL STENOSIS OF LUMBAR REGION, UNSPECIFIED WHETHER NEUROGENIC CLAUDICATION PRESENT: ICD-10-CM

## 2024-05-10 DIAGNOSIS — M47.817 LUMBOSACRAL SPONDYLOSIS WITHOUT MYELOPATHY: ICD-10-CM

## 2024-05-10 DIAGNOSIS — M54.16 LUMBAR RADICULOPATHY: Primary | ICD-10-CM

## 2024-05-10 DIAGNOSIS — M51.36 LUMBAR DEGENERATIVE DISC DISEASE: ICD-10-CM

## 2024-05-10 PROCEDURE — G8427 DOCREV CUR MEDS BY ELIG CLIN: HCPCS | Performed by: STUDENT IN AN ORGANIZED HEALTH CARE EDUCATION/TRAINING PROGRAM

## 2024-05-10 PROCEDURE — 1036F TOBACCO NON-USER: CPT | Performed by: STUDENT IN AN ORGANIZED HEALTH CARE EDUCATION/TRAINING PROGRAM

## 2024-05-10 PROCEDURE — G8417 CALC BMI ABV UP PARAM F/U: HCPCS | Performed by: STUDENT IN AN ORGANIZED HEALTH CARE EDUCATION/TRAINING PROGRAM

## 2024-05-10 PROCEDURE — 1124F ACP DISCUSS-NO DSCNMKR DOCD: CPT | Performed by: STUDENT IN AN ORGANIZED HEALTH CARE EDUCATION/TRAINING PROGRAM

## 2024-05-10 PROCEDURE — 3017F COLORECTAL CA SCREEN DOC REV: CPT | Performed by: STUDENT IN AN ORGANIZED HEALTH CARE EDUCATION/TRAINING PROGRAM

## 2024-05-10 PROCEDURE — 99213 OFFICE O/P EST LOW 20 MIN: CPT | Performed by: STUDENT IN AN ORGANIZED HEALTH CARE EDUCATION/TRAINING PROGRAM

## 2024-05-10 NOTE — PROGRESS NOTES
Chronic right shoulder pain     Chronic shoulder pain, left 2001    had surgery to repair    Environmental allergies     History of general anesthesia complication 5/16/2001    airway closed off after intubation after cervical neck surgery, was remedied with a counteracting medication    HLD (hyperlipidemia)     Hypertension     DARYL on CPAP 4/22/2015    Dr. Nance. Auto, 12-16cm of H20    Respiratory failure, post-operative (HCC) 5/16/2001    due to airway swelling obstruction after intubation due to certain medication    Rotator cuff tear, left 2014    Dr. Ware    Rotator cuff tear, right 4/27/2015    Dr. Ware,        Past Surgical History:   Procedure Laterality Date    AMPUTATION Right     right below knee amputation, due to industrial accident    CARPAL TUNNEL RELEASE Left 01/2013    CARPAL TUNNEL RELEASE Right 08/03/2016    CERVICAL FUSION      C-spine fusion times 3 levels    CERVICAL LAMINECTOMY  01/2013    C 4-5-6-7    COLONOSCOPY  04/07/2006    COLONOSCOPY N/A 10/24/2018    COLONOSCOPY WITH BIOPSY and snare polypectomy performed by Fercho Ramos MD at [facility] OR    FOOT SURGERY      HAND SURGERY Left 12/15/2016    Reconstuctive Joint    HAND TENDON SURGERY      repair    HEMORRHOID SURGERY      KNEE SURGERY Bilateral     removed bursa and trimmed cartilage    LEG AMPUTATION BELOW KNEE Right 08/31/2022    RIGHT BELOW KNEE AMPUTATION REVISION performed by Josef Jacobson MD at [facility] CVOR    PAIN MANAGEMENT PROCEDURE      ROTATOR CUFF REPAIR Left 03/2014    Dr. Ware, open repair    ROTATOR CUFF REPAIR Right 04/27/2015    SPINE SURGERY      see cervical laminectomy    TUMOR REMOVAL Left 2015    Dr. Natalie allan tumor removed index finger       Family History   Problem Relation Age of Onset    Cancer Mother         breast with mets to brain, bone, lymph    Parkinsonism Father     Arthritis Father     Alzheimer's Disease Father     Heart Disease Maternal Grandmother     Heart Disease Maternal

## 2024-05-20 ENCOUNTER — OFFICE VISIT (OUTPATIENT)
Dept: PHYSICAL MEDICINE AND REHAB | Age: 69
End: 2024-05-20

## 2024-05-20 VITALS
HEART RATE: 61 BPM | SYSTOLIC BLOOD PRESSURE: 119 MMHG | TEMPERATURE: 98.3 F | WEIGHT: 198 LBS | BODY MASS INDEX: 30.11 KG/M2 | DIASTOLIC BLOOD PRESSURE: 74 MMHG

## 2024-05-20 DIAGNOSIS — D36.13 NEUROMA OF RIGHT LOWER EXTREMITY: ICD-10-CM

## 2024-05-20 DIAGNOSIS — S88.111A TRAUMATIC BELOW-KNEE AMPUTATION OF RIGHT LOWER EXTREMITY WITH COMPLICATION, INITIAL ENCOUNTER (HCC): Primary | ICD-10-CM

## 2024-06-10 ENCOUNTER — HOSPITAL ENCOUNTER (OUTPATIENT)
Age: 69
Discharge: HOME | End: 2024-06-10
Payer: MEDICARE

## 2024-06-10 LAB
ALBUMIN SERPL-MCNC: 4.6 G/DL (ref 3.5–5.2)
ALP SERPL-CCNC: 99 U/L (ref 40–129)
ALT SERPL-CCNC: 28 U/L (ref 5–41)
ANION GAP SERPL CALCULATED.3IONS-SCNC: 9 MMOL/L (ref 9–17)
AST SERPL-CCNC: 24 U/L
BILIRUB SERPL-MCNC: 0.5 MG/DL (ref 0.3–1.2)
BUN SERPL-MCNC: 18 MG/DL (ref 8–23)
CALCIUM SERPL-MCNC: 9.4 MG/DL (ref 8.6–10.4)
CHLORIDE SERPL-SCNC: 106 MMOL/L (ref 98–107)
CHOLEST SERPL-MCNC: 146 MG/DL
CHOLESTEROL/HDL RATIO: 4.9
CO2 SERPL-SCNC: 24 MMOL/L (ref 20–31)
CREAT SERPL-MCNC: 0.9 MG/DL (ref 0.7–1.2)
EST. AVERAGE GLUCOSE BLD GHB EST-MCNC: 111 MG/DL
GFR, ESTIMATED: >90 ML/MIN/1.73M2
GLUCOSE SERPL-MCNC: 112 MG/DL (ref 70–99)
HBA1C MFR BLD: 5.5 % (ref 4–6)
HDLC SERPL-MCNC: 30 MG/DL
LDLC SERPL CALC-MCNC: 80 MG/DL (ref 0–130)
POTASSIUM SERPL-SCNC: 4.8 MMOL/L (ref 3.7–5.3)
PROT SERPL-MCNC: 7.8 G/DL (ref 6.4–8.3)
PSA SERPL-MCNC: 0.2 NG/ML (ref 0–4)
SODIUM SERPL-SCNC: 139 MMOL/L (ref 135–144)
TRIGL SERPL-MCNC: 178 MG/DL
URATE SERPL-MCNC: 7.5 MG/DL (ref 3.4–7)

## 2024-06-10 PROCEDURE — 80053 COMPREHEN METABOLIC PANEL: CPT

## 2024-06-10 PROCEDURE — 83036 HEMOGLOBIN GLYCOSYLATED A1C: CPT

## 2024-06-10 PROCEDURE — 36415 COLL VENOUS BLD VENIPUNCTURE: CPT

## 2024-06-10 PROCEDURE — 84550 ASSAY OF BLOOD/URIC ACID: CPT

## 2024-06-10 PROCEDURE — G0103 PSA SCREENING: HCPCS

## 2024-06-10 PROCEDURE — 80061 LIPID PANEL: CPT

## 2024-07-11 ENCOUNTER — OFFICE VISIT (OUTPATIENT)
Dept: PAIN MANAGEMENT | Age: 69
End: 2024-07-11
Payer: MEDICARE

## 2024-07-11 ENCOUNTER — TELEPHONE (OUTPATIENT)
Dept: PHYSICAL MEDICINE AND REHAB | Age: 69
End: 2024-07-11

## 2024-07-11 VITALS — WEIGHT: 198 LBS | HEIGHT: 68 IN | BODY MASS INDEX: 30.01 KG/M2

## 2024-07-11 DIAGNOSIS — M47.817 LUMBOSACRAL SPONDYLOSIS WITHOUT MYELOPATHY: Primary | ICD-10-CM

## 2024-07-11 DIAGNOSIS — M54.50 CHRONIC LOW BACK PAIN, UNSPECIFIED BACK PAIN LATERALITY, UNSPECIFIED WHETHER SCIATICA PRESENT: ICD-10-CM

## 2024-07-11 DIAGNOSIS — G89.29 CHRONIC LOW BACK PAIN, UNSPECIFIED BACK PAIN LATERALITY, UNSPECIFIED WHETHER SCIATICA PRESENT: ICD-10-CM

## 2024-07-11 DIAGNOSIS — M51.36 LUMBAR DEGENERATIVE DISC DISEASE: ICD-10-CM

## 2024-07-11 PROCEDURE — 99214 OFFICE O/P EST MOD 30 MIN: CPT | Performed by: NURSE PRACTITIONER

## 2024-07-11 PROCEDURE — 1036F TOBACCO NON-USER: CPT | Performed by: NURSE PRACTITIONER

## 2024-07-11 PROCEDURE — 3017F COLORECTAL CA SCREEN DOC REV: CPT | Performed by: NURSE PRACTITIONER

## 2024-07-11 PROCEDURE — G8417 CALC BMI ABV UP PARAM F/U: HCPCS | Performed by: NURSE PRACTITIONER

## 2024-07-11 PROCEDURE — 1124F ACP DISCUSS-NO DSCNMKR DOCD: CPT | Performed by: NURSE PRACTITIONER

## 2024-07-11 PROCEDURE — G8427 DOCREV CUR MEDS BY ELIG CLIN: HCPCS | Performed by: NURSE PRACTITIONER

## 2024-07-11 RX ORDER — PREDNISONE 20 MG/1
TABLET ORAL
COMMUNITY
Start: 2024-05-28

## 2024-07-11 ASSESSMENT — ENCOUNTER SYMPTOMS
CONSTIPATION: 0
COUGH: 0
BOWEL INCONTINENCE: 0
BACK PAIN: 1
SHORTNESS OF BREATH: 0

## 2024-07-11 NOTE — PROGRESS NOTES
Chief Complaint   Patient presents with    Back Pain         [facility]   Pt complains of low back pain. Lumbar MRI 10/2022 with spinal stenosis L4-5 and L3-4 and to lesser degree L2-3 with degenerative changes. He underwent a  left lumbar L4 and L5 transforaminal epidural steroid injection on 4/30/2024 with minimal improvement in pain and function. Discussed trying lumbar MBB with Dr. Escoto at last visit. He would like to schedule this.     He does have a small lesion on the lateral aspect of right knee due to irritation from his prosthesis. There is a small scab and some erythema around the edges of the wound. No drainage. He denies fever or chills. Will have him get this checked by PCP before scheduling MBB.    Back Pain  This is a chronic problem. The current episode started more than 1 year ago. The problem occurs constantly. The problem is unchanged. The pain is present in the lumbar spine. The quality of the pain is described as aching. The pain radiates to the left foot and right knee. The pain is at a severity of 3/10. The pain is moderate. The pain is The same all the time. The symptoms are aggravated by bending, position, twisting, lying down, sitting, standing and coughing. Stiffness is present All day. Pertinent negatives include no bladder incontinence, bowel incontinence, chest pain or fever. He has tried home exercises, bed rest, walking, NSAIDs and analgesics for the symptoms. The treatment provided no relief.     Patient denies any new neurological symptoms. No bowel or bladder incontinence, no weakness, and no falling.    Past Medical History:   Diagnosis Date    Arthritis     Chronic right shoulder pain     Chronic shoulder pain, left 2001    had surgery to repair    Environmental allergies     History of general anesthesia complication 5/16/2001    airway closed off after intubation after cervical neck surgery, was remedied with a counteracting medication    HLD (hyperlipidemia)

## 2024-07-11 NOTE — TELEPHONE ENCOUNTER
Patient is following up with primary care today so they can take a look at it. He contacted wound care and they suggested following up with them after these appointments if needed. Thank you.

## 2024-07-11 NOTE — TELEPHONE ENCOUNTER
Oziel called in after being seen by pain management. They noted he does have a small lesion on the lateral aspect of right knee due to irritation from his prosthesis. There is a small scab and some erythema around the edges of the wound. No drainage. He denies fever or chills. He was asking for you to prescribe antibiotics for him. I advised him that he could get in sooner at an urgent care or walk in and they could prescribe something if needed. I did schedule him sooner for 7/22 to address current needs.

## 2024-07-15 NOTE — TELEPHONE ENCOUNTER
Patient went to PCP on 7/11    Patient Instructions  - documented in this encounter  Patient Instructions  Rene Todd PA-C - 07/11/2024 11:15 AM EDT   Formatting of this note might be different from the original.  Begin cephalexin 1000mg AM and PM x 7 days  Begin topical bacitracin  Stay off prosthesis

## 2024-07-22 ENCOUNTER — OFFICE VISIT (OUTPATIENT)
Dept: PHYSICAL MEDICINE AND REHAB | Age: 69
End: 2024-07-22

## 2024-07-22 VITALS
WEIGHT: 187.4 LBS | HEART RATE: 51 BPM | DIASTOLIC BLOOD PRESSURE: 85 MMHG | BODY MASS INDEX: 28.49 KG/M2 | SYSTOLIC BLOOD PRESSURE: 120 MMHG | TEMPERATURE: 97.5 F

## 2024-07-22 DIAGNOSIS — D36.13 NEUROMA OF RIGHT LOWER EXTREMITY: ICD-10-CM

## 2024-07-22 DIAGNOSIS — S88.111A TRAUMATIC BELOW-KNEE AMPUTATION OF RIGHT LOWER EXTREMITY WITH COMPLICATION, INITIAL ENCOUNTER (HCC): Primary | ICD-10-CM

## 2024-07-22 DIAGNOSIS — S81.801D WOUND OF RIGHT LOWER EXTREMITY, SUBSEQUENT ENCOUNTER: ICD-10-CM

## 2024-07-22 RX ORDER — ALUMINUM ZIRCONIUM OCTACHLOROHYDREX GLY 16 G/100G
4 GEL TOPICAL DAILY
COMMUNITY
Start: 2024-07-11 | End: 2024-07-25

## 2024-07-22 RX ORDER — GINSENG 100 MG
CAPSULE ORAL
COMMUNITY
Start: 2024-07-11

## 2024-07-22 RX ORDER — CEPHALEXIN 500 MG/1
CAPSULE ORAL
COMMUNITY
Start: 2024-07-11 | End: 2024-08-11

## 2024-07-22 NOTE — PROGRESS NOTES
[facility] PHYSICAL MEDICINE AND REHABILITATION  [address] [place] [address]  [facility] [address]  Dept: [phone]  Dept Fax: [phone]      Oziel Valadez is a 69 y.o.-year-old male presenting for follow up of right below-knee amputation.     HPI:     Mr. 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  He has history of two revision surgeries (most recent one on 8/31/22).  He does currently have a prosthesis.    Any problems with current prosthesis? Yes - He states that the valve on the prosthetic socket is \"sticking\" intermittently, although it has recently been replaced.  He also notes two wounds located on the medial and lateral aspects of the right knee, which he thinks may be related to bunching of the prosthetic liner.  He does not use an assistive device for mobility/ambulation most of the time when he is wearing his prosthesis.  However, he sometimes uses an axillary crutch or a cane when he has pain in the right residual limb.  When he is not using his prosthesis, he uses axillary crutches or a manual wheelchair for mobility.  He has participated in physical therapy for training to use prosthesis.    The patient is able to don and doff the prosthesis with no assistance.  He does not require assistance at home.    Patient would like to be able to continue ambulating and completing ADLs independently.  He keeps active by doing projects at home and will be overseeing the building of his new home soon.    Areas of pain or weakness:  He reports pain at the medial and lateral aspects of the right knee at the site of two wounds.  These wounds are healing at this time after he received an antibiotic from his PCP and stopped wearing his prosthesis for several days.  He also notes low back pain.  He remains on gabapentin 900mg TID.  He feels like he has

## 2024-07-27 RX ORDER — GABAPENTIN 300 MG/1
900 CAPSULE ORAL 3 TIMES DAILY
Qty: 270 CAPSULE | Refills: 4 | Status: SHIPPED | OUTPATIENT
Start: 2024-07-27 | End: 2024-12-24

## 2024-08-01 ENCOUNTER — OFFICE VISIT (OUTPATIENT)
Dept: PAIN MANAGEMENT | Age: 69
End: 2024-08-01
Payer: MEDICARE

## 2024-08-01 VITALS — HEIGHT: 68 IN | BODY MASS INDEX: 28.34 KG/M2 | WEIGHT: 187 LBS

## 2024-08-01 DIAGNOSIS — G89.29 CHRONIC LOW BACK PAIN, UNSPECIFIED BACK PAIN LATERALITY, UNSPECIFIED WHETHER SCIATICA PRESENT: ICD-10-CM

## 2024-08-01 DIAGNOSIS — M47.817 LUMBOSACRAL SPONDYLOSIS WITHOUT MYELOPATHY: Primary | ICD-10-CM

## 2024-08-01 DIAGNOSIS — M54.50 CHRONIC LOW BACK PAIN, UNSPECIFIED BACK PAIN LATERALITY, UNSPECIFIED WHETHER SCIATICA PRESENT: ICD-10-CM

## 2024-08-01 PROCEDURE — G8417 CALC BMI ABV UP PARAM F/U: HCPCS | Performed by: NURSE PRACTITIONER

## 2024-08-01 PROCEDURE — G8427 DOCREV CUR MEDS BY ELIG CLIN: HCPCS | Performed by: NURSE PRACTITIONER

## 2024-08-01 PROCEDURE — 1124F ACP DISCUSS-NO DSCNMKR DOCD: CPT | Performed by: NURSE PRACTITIONER

## 2024-08-01 PROCEDURE — 3017F COLORECTAL CA SCREEN DOC REV: CPT | Performed by: NURSE PRACTITIONER

## 2024-08-01 PROCEDURE — 99214 OFFICE O/P EST MOD 30 MIN: CPT | Performed by: NURSE PRACTITIONER

## 2024-08-01 PROCEDURE — 1036F TOBACCO NON-USER: CPT | Performed by: NURSE PRACTITIONER

## 2024-08-01 ASSESSMENT — ENCOUNTER SYMPTOMS
BACK PAIN: 1
CONSTIPATION: 0
BOWEL INCONTINENCE: 1
SHORTNESS OF BREATH: 0
COUGH: 0

## 2024-08-01 NOTE — PROGRESS NOTES
Chief Complaint   Patient presents with    Back Pain         [facility]   Pt complains of low back pain. Lumbar MRI 10/2022 with spinal stenosis L4-5 and L3-4 and to lesser degree L2-3 with degenerative changes. He underwent a  left lumbar L4 and L5 transforaminal epidural steroid injection on 4/30/2024 with minimal improvement in pain and function. Discussed trying lumbar MBB with Dr. Escoto at last visit. He would like to schedule this.      He does have a small lesion on the lateral aspect of right knee due to irritation from his prosthesis. There is a small scab and some erythema around the edges of the wound. No drainage. He denies fever or chills.     He did see his PCP and completed a 7 day course of Keflex 7/11-7/17    Back Pain  This is a chronic problem. The current episode started more than 1 year ago. The problem occurs constantly. The problem is unchanged. The pain is present in the lumbar spine. The quality of the pain is described as aching and shooting. The pain radiates to the left foot. The pain is at a severity of 4/10. The pain is moderate. Worse during: varies. The symptoms are aggravated by bending, standing, twisting, sitting, position and lying down. Stiffness is present All day. Associated symptoms include bladder incontinence and bowel incontinence. Pertinent negatives include no chest pain or fever. He has tried home exercises and NSAIDs for the symptoms. The treatment provided no relief.       Patient denies any new neurological symptoms. No bowel or bladder incontinence, no weakness, and no falling.      Past Medical History:   Diagnosis Date    Arthritis     Chronic right shoulder pain     Chronic shoulder pain, left 2001    had surgery to repair    Environmental allergies     History of general anesthesia complication 5/16/2001    airway closed off after intubation after cervical neck surgery, was remedied with a counteracting medication    HLD (hyperlipidemia)     Hypertension

## 2024-08-13 ENCOUNTER — TELEPHONE (OUTPATIENT)
Dept: PAIN MANAGEMENT | Age: 69
End: 2024-08-13

## 2024-08-28 ENCOUNTER — OFFICE VISIT (OUTPATIENT)
Dept: PHYSICAL MEDICINE AND REHAB | Age: 69
End: 2024-08-28

## 2024-08-28 VITALS
HEART RATE: 68 BPM | HEIGHT: 68 IN | SYSTOLIC BLOOD PRESSURE: 130 MMHG | BODY MASS INDEX: 28.34 KG/M2 | WEIGHT: 187 LBS | DIASTOLIC BLOOD PRESSURE: 70 MMHG

## 2024-08-28 DIAGNOSIS — Z89.511 S/P BKA (BELOW KNEE AMPUTATION) UNILATERAL, RIGHT (HCC): Primary | ICD-10-CM

## 2024-08-28 DIAGNOSIS — S88.111A TRAUMATIC BELOW-KNEE AMPUTATION OF RIGHT LOWER EXTREMITY WITH COMPLICATION, INITIAL ENCOUNTER (HCC): ICD-10-CM

## 2024-08-28 DIAGNOSIS — D36.13 NEUROMA OF RIGHT LOWER EXTREMITY: ICD-10-CM

## 2024-08-28 RX ORDER — ALLOPURINOL 100 MG/1
100 TABLET ORAL DAILY
COMMUNITY
Start: 2024-08-28

## 2024-09-03 NOTE — PROGRESS NOTES
[facility] PHYSICAL MEDICINE & REHABILITATION  [address] [address]  Dept: [phone]  Dept Fax: [phone]      Oziel Valadez is a 69 y.o.-year-old male presenting for prosthetic evaluation regarding right below-knee amputation.     HPI:     Mr. 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  He has history of two revision surgeries (most recent one on 8/31/22).  He does currently have a prosthesis.    Any problems with current prosthesis? Yes - He reports pain at the medial and lateral aspects of the right knee related to areas of pressure from the prosthetic liner and/or socket.  He also previously had wounds in these areas, which are now mostly healed.  However, he states that theses areas do open and bleed at times.  He also reports pain across the posterior right knee with wearing the prosthesis.  Due to the wounds and pain, he is only wearing his prosthesis about 60% of the time (or less).  He does not use an assistive device for mobility/ambulation most of the time when he is wearing his prosthesis.  However, he sometimes uses an axillary crutch or a cane when he has pain in the right residual limb.  When he is not using his prosthesis, he uses axillary crutches or manual wheelchair for mobility.  He has participated in physical therapy for training to use prosthesis.    The patient is able to don and doff the prosthesis with no assistance.  He does not require assistance at home.    Patient would like to be able to continue ambulating and completing ADLs independently.  He keeps active by doing projects at home and will be overseeing the building of his new home soon.    Areas of pain or weakness:  He continues to report pain at the medial and lateral aspects of the right knee as well as across the posterior right knee.  He

## 2024-09-04 ASSESSMENT — ENCOUNTER SYMPTOMS: BACK PAIN: 1

## 2024-09-10 ENCOUNTER — HOSPITAL ENCOUNTER (OUTPATIENT)
Dept: PAIN MANAGEMENT | Facility: CLINIC | Age: 69
Discharge: HOME | End: 2024-09-10
Payer: MEDICARE

## 2024-09-10 VITALS
RESPIRATION RATE: 10 BRPM | HEART RATE: 54 BPM | BODY MASS INDEX: 28.04 KG/M2 | TEMPERATURE: 97.4 F | WEIGHT: 185 LBS | HEIGHT: 68 IN | DIASTOLIC BLOOD PRESSURE: 74 MMHG | SYSTOLIC BLOOD PRESSURE: 138 MMHG | OXYGEN SATURATION: 97 %

## 2024-09-10 DIAGNOSIS — R52 PAIN MANAGEMENT: ICD-10-CM

## 2024-09-10 PROCEDURE — 64493 INJ PARAVERT F JNT L/S 1 LEV: CPT | Performed by: STUDENT IN AN ORGANIZED HEALTH CARE EDUCATION/TRAINING PROGRAM

## 2024-09-10 PROCEDURE — 2500000003 HC RX 250 WO HCPCS: Performed by: STUDENT IN AN ORGANIZED HEALTH CARE EDUCATION/TRAINING PROGRAM

## 2024-09-10 PROCEDURE — 64494 INJ PARAVERT F JNT L/S 2 LEV: CPT | Performed by: STUDENT IN AN ORGANIZED HEALTH CARE EDUCATION/TRAINING PROGRAM

## 2024-09-10 PROCEDURE — 64493 INJ PARAVERT F JNT L/S 1 LEV: CPT

## 2024-09-10 PROCEDURE — 64494 INJ PARAVERT F JNT L/S 2 LEV: CPT

## 2024-09-10 RX ORDER — LIDOCAINE HYDROCHLORIDE 20 MG/ML
INJECTION, SOLUTION EPIDURAL; INFILTRATION; INTRACAUDAL; PERINEURAL
Status: COMPLETED | OUTPATIENT
Start: 2024-09-10 | End: 2024-09-10

## 2024-09-10 RX ADMIN — LIDOCAINE HYDROCHLORIDE 5 ML: 20 INJECTION, SOLUTION EPIDURAL; INFILTRATION; INTRACAUDAL; PERINEURAL at 09:47

## 2024-09-10 ASSESSMENT — PAIN DESCRIPTION - DESCRIPTORS: DESCRIPTORS: SHOOTING

## 2024-09-10 ASSESSMENT — PAIN - FUNCTIONAL ASSESSMENT
PAIN_FUNCTIONAL_ASSESSMENT: PREVENTS OR INTERFERES WITH MANY ACTIVE NOT PASSIVE ACTIVITIES
PAIN_FUNCTIONAL_ASSESSMENT: 0-10

## 2024-09-12 ENCOUNTER — TELEPHONE (OUTPATIENT)
Dept: PAIN MANAGEMENT | Age: 69
End: 2024-09-12

## 2024-09-24 ENCOUNTER — HOSPITAL ENCOUNTER (OUTPATIENT)
Dept: PAIN MANAGEMENT | Facility: CLINIC | Age: 69
Discharge: HOME | End: 2024-09-24
Payer: MEDICARE

## 2024-09-24 VITALS
BODY MASS INDEX: 27.28 KG/M2 | RESPIRATION RATE: 14 BRPM | OXYGEN SATURATION: 95 % | HEIGHT: 68 IN | SYSTOLIC BLOOD PRESSURE: 120 MMHG | DIASTOLIC BLOOD PRESSURE: 62 MMHG | HEART RATE: 63 BPM | WEIGHT: 180 LBS | TEMPERATURE: 97.7 F

## 2024-09-24 DIAGNOSIS — R52 PAIN MANAGEMENT: ICD-10-CM

## 2024-09-24 PROCEDURE — 64493 INJ PARAVERT F JNT L/S 1 LEV: CPT | Performed by: STUDENT IN AN ORGANIZED HEALTH CARE EDUCATION/TRAINING PROGRAM

## 2024-09-24 PROCEDURE — 64493 INJ PARAVERT F JNT L/S 1 LEV: CPT

## 2024-09-24 PROCEDURE — 2500000003 HC RX 250 WO HCPCS: Performed by: STUDENT IN AN ORGANIZED HEALTH CARE EDUCATION/TRAINING PROGRAM

## 2024-09-24 PROCEDURE — 64494 INJ PARAVERT F JNT L/S 2 LEV: CPT

## 2024-09-24 PROCEDURE — 64494 INJ PARAVERT F JNT L/S 2 LEV: CPT | Performed by: STUDENT IN AN ORGANIZED HEALTH CARE EDUCATION/TRAINING PROGRAM

## 2024-09-24 RX ORDER — LIDOCAINE HYDROCHLORIDE 20 MG/ML
INJECTION, SOLUTION EPIDURAL; INFILTRATION; INTRACAUDAL; PERINEURAL
Status: COMPLETED | OUTPATIENT
Start: 2024-09-24 | End: 2024-09-24

## 2024-09-24 RX ADMIN — LIDOCAINE HYDROCHLORIDE 5 ML: 20 INJECTION, SOLUTION EPIDURAL; INFILTRATION; INTRACAUDAL; PERINEURAL at 09:12

## 2024-09-24 ASSESSMENT — PAIN - FUNCTIONAL ASSESSMENT
PAIN_FUNCTIONAL_ASSESSMENT: 0-10
PAIN_FUNCTIONAL_ASSESSMENT: 0-10

## 2024-09-24 ASSESSMENT — PAIN DESCRIPTION - DESCRIPTORS: DESCRIPTORS: OTHER (COMMENT)

## 2024-10-24 ENCOUNTER — HOSPITAL ENCOUNTER (OUTPATIENT)
Age: 69
Discharge: HOME | End: 2024-10-24
Payer: MEDICARE

## 2024-10-24 LAB — URATE SERPL-MCNC: 5.7 MG/DL (ref 3.4–7)

## 2024-10-24 PROCEDURE — 36415 COLL VENOUS BLD VENIPUNCTURE: CPT

## 2024-10-24 PROCEDURE — 84550 ASSAY OF BLOOD/URIC ACID: CPT

## 2024-11-04 ENCOUNTER — HOSPITAL ENCOUNTER (OUTPATIENT)
Age: 69
Discharge: HOME | End: 2024-11-06
Payer: MEDICARE

## 2024-11-04 ENCOUNTER — OFFICE VISIT (OUTPATIENT)
Dept: FAMILY MEDICINE CLINIC | Age: 69
End: 2024-11-04
Payer: MEDICARE

## 2024-11-04 ENCOUNTER — HOSPITAL ENCOUNTER (OUTPATIENT)
Dept: GENERAL RADIOLOGY | Age: 69
Discharge: HOME | End: 2024-11-06
Payer: MEDICARE

## 2024-11-04 VITALS
DIASTOLIC BLOOD PRESSURE: 78 MMHG | BODY MASS INDEX: 28.28 KG/M2 | SYSTOLIC BLOOD PRESSURE: 116 MMHG | OXYGEN SATURATION: 97 % | HEART RATE: 64 BPM | HEIGHT: 68 IN | WEIGHT: 186.6 LBS | RESPIRATION RATE: 15 BRPM

## 2024-11-04 DIAGNOSIS — M79.672 LEFT FOOT PAIN: ICD-10-CM

## 2024-11-04 DIAGNOSIS — M79.672 LEFT FOOT PAIN: Primary | ICD-10-CM

## 2024-11-04 PROCEDURE — 99213 OFFICE O/P EST LOW 20 MIN: CPT | Performed by: NURSE PRACTITIONER

## 2024-11-04 PROCEDURE — 3017F COLORECTAL CA SCREEN DOC REV: CPT | Performed by: NURSE PRACTITIONER

## 2024-11-04 PROCEDURE — 3078F DIAST BP <80 MM HG: CPT | Performed by: NURSE PRACTITIONER

## 2024-11-04 PROCEDURE — G8484 FLU IMMUNIZE NO ADMIN: HCPCS | Performed by: NURSE PRACTITIONER

## 2024-11-04 PROCEDURE — G8417 CALC BMI ABV UP PARAM F/U: HCPCS | Performed by: NURSE PRACTITIONER

## 2024-11-04 PROCEDURE — 3074F SYST BP LT 130 MM HG: CPT | Performed by: NURSE PRACTITIONER

## 2024-11-04 PROCEDURE — 1036F TOBACCO NON-USER: CPT | Performed by: NURSE PRACTITIONER

## 2024-11-04 PROCEDURE — G8427 DOCREV CUR MEDS BY ELIG CLIN: HCPCS | Performed by: NURSE PRACTITIONER

## 2024-11-04 PROCEDURE — 1124F ACP DISCUSS-NO DSCNMKR DOCD: CPT | Performed by: NURSE PRACTITIONER

## 2024-11-04 PROCEDURE — 73630 X-RAY EXAM OF FOOT: CPT

## 2024-11-04 SDOH — ECONOMIC STABILITY: FOOD INSECURITY: WITHIN THE PAST 12 MONTHS, YOU WORRIED THAT YOUR FOOD WOULD RUN OUT BEFORE YOU GOT MONEY TO BUY MORE.: NEVER TRUE

## 2024-11-04 SDOH — ECONOMIC STABILITY: INCOME INSECURITY: HOW HARD IS IT FOR YOU TO PAY FOR THE VERY BASICS LIKE FOOD, HOUSING, MEDICAL CARE, AND HEATING?: NOT HARD AT ALL

## 2024-11-04 SDOH — ECONOMIC STABILITY: FOOD INSECURITY: WITHIN THE PAST 12 MONTHS, THE FOOD YOU BOUGHT JUST DIDN'T LAST AND YOU DIDN'T HAVE MONEY TO GET MORE.: NEVER TRUE

## 2024-11-04 ASSESSMENT — ENCOUNTER SYMPTOMS
SHORTNESS OF BREATH: 0
VOMITING: 0
SORE THROAT: 0
NAUSEA: 0
RHINORRHEA: 0
WHEEZING: 0
ABDOMINAL PAIN: 0
TROUBLE SWALLOWING: 0
COUGH: 0

## 2024-11-04 ASSESSMENT — PATIENT HEALTH QUESTIONNAIRE - PHQ9
SUM OF ALL RESPONSES TO PHQ QUESTIONS 1-9: 0
1. LITTLE INTEREST OR PLEASURE IN DOING THINGS: NOT AT ALL
SUM OF ALL RESPONSES TO PHQ QUESTIONS 1-9: 0
SUM OF ALL RESPONSES TO PHQ9 QUESTIONS 1 & 2: 0
2. FEELING DOWN, DEPRESSED OR HOPELESS: NOT AT ALL

## 2024-11-04 NOTE — PROGRESS NOTES
[facility]in  [address]  [address]  [place] [address]    Oziel Valadez is a 69 y.o. male who presents today for his medical conditions/complaints of Pain (Left foot pain x 2-3 days dropped something on it (hammer) )          HPI:     /78   Pulse 64   Resp 15   Ht 1.727 m (5' 8\")   Wt 84.6 kg (186 lb 9.6 oz)   SpO2 97%   BMI 28.37 kg/m²       Pain  This is a new problem. The current episode started in the past 7 days. The problem occurs constantly. The problem has been unchanged. Associated symptoms include arthralgias and joint swelling. Pertinent negatives include no abdominal pain, chest pain, chills, congestion, coughing, fever, headaches, nausea, rash, sore throat or vomiting. Associated symptoms comments: Antalgic gait . Nothing aggravates the symptoms. He has tried nothing for the symptoms. The treatment provided no relief.   Pt reports he dropped a hammer on his left foot several days ago. It did not cause too much pain initially but it has become more painful with time. He is having a hard time walking due to the pain.     Past Medical History:   Diagnosis Date    Arthritis     Chronic right shoulder pain     Chronic shoulder pain, left 2001    had surgery to repair    Environmental allergies     History of general anesthesia complication 5/16/2001    airway closed off after intubation after cervical neck surgery, was remedied with a counteracting medication    HLD (hyperlipidemia)     Hypertension     DARYL on CPAP 4/22/2015    Dr. Nance. Auto, 12-16cm of H20    Respiratory failure, post-operative 5/16/2001    due to airway swelling obstruction after intubation due to certain medication    Rotator cuff tear, left 2014    Dr. Ware    Rotator cuff tear, right 4/27/2015    Dr. Ware,         Past Surgical History:   Procedure Laterality Date    AMPUTATION Right     right below knee amputation, due to industrial accident    CARPAL TUNNEL RELEASE Left 01/2013

## 2024-12-09 DIAGNOSIS — G54.6 PAIN, PHANTOM LIMB (HCC): Primary | ICD-10-CM

## 2024-12-09 DIAGNOSIS — Z89.511 S/P BKA (BELOW KNEE AMPUTATION) UNILATERAL, RIGHT (HCC): ICD-10-CM

## 2024-12-09 NOTE — TELEPHONE ENCOUNTER
Pt is requesting refill of gabapentin be sent to local pharmacy.  His mail order never gets to him in time.  He would like to try and get a 90 supply     Order entered.  He cancelled the mail order pharmacy.

## 2024-12-10 RX ORDER — GABAPENTIN 300 MG/1
900 CAPSULE ORAL 3 TIMES DAILY
Qty: 810 CAPSULE | Refills: 1 | Status: SHIPPED | OUTPATIENT
Start: 2024-12-10 | End: 2025-06-08

## 2024-12-19 ENCOUNTER — OFFICE VISIT (OUTPATIENT)
Dept: PHYSICAL MEDICINE AND REHAB | Age: 69
End: 2024-12-19

## 2024-12-19 VITALS
TEMPERATURE: 97 F | BODY MASS INDEX: 28.68 KG/M2 | HEART RATE: 56 BPM | DIASTOLIC BLOOD PRESSURE: 67 MMHG | WEIGHT: 188.6 LBS | SYSTOLIC BLOOD PRESSURE: 122 MMHG

## 2024-12-19 DIAGNOSIS — S88.111A TRAUMATIC BELOW-KNEE AMPUTATION OF RIGHT LOWER EXTREMITY WITH COMPLICATION, INITIAL ENCOUNTER (HCC): ICD-10-CM

## 2024-12-19 DIAGNOSIS — Z89.511 S/P BKA (BELOW KNEE AMPUTATION) UNILATERAL, RIGHT (HCC): Primary | ICD-10-CM

## 2024-12-19 DIAGNOSIS — D36.13 NEUROMA OF RIGHT LOWER EXTREMITY: ICD-10-CM

## 2024-12-26 ASSESSMENT — ENCOUNTER SYMPTOMS
BACK PAIN: 1
COLOR CHANGE: 1

## 2024-12-26 NOTE — PROGRESS NOTES
Alcohol/week: 1.0 standard drink of alcohol     Types: 1 Glasses of wine per week     Comment: 1 glass of wine every 3 days    Drug use: No    Sexual activity: Yes     Partners: Female     Social Determinants of Health     Financial Resource Strain: Low Risk  (11/4/2024)    Overall Financial Resource Strain (CARDIA)     Difficulty of Paying Living Expenses: Not hard at all   Food Insecurity: No Food Insecurity (11/4/2024)    Hunger Vital Sign     Worried About Running Out of Food in the Last Year: Never true     Ran Out of Food in the Last Year: Never true   Transportation Needs: Unknown (11/4/2024)    PRAPARE - Transportation     Lack of Transportation (Non-Medical): No   Housing Stability: Unknown (11/4/2024)    Housing Stability Vital Sign     Homeless in the Last Year: No       Allergies   Allergen Reactions    Cat Hair Extract     Dog Epithelium     Other Rash     MD thinks he is allergic to something in the environment.       Current Outpatient Medications   Medication Sig Dispense Refill    gabapentin (NEURONTIN) 300 MG capsule Take 3 capsules by mouth 3 times daily for 180 days. 810 capsule 1    allopurinol (ZYLOPRIM) 100 MG tablet Take 1 tablet by mouth daily      atorvastatin (LIPITOR) 20 MG tablet       metoprolol succinate (TOPROL XL) 25 MG extended release tablet take 1 tablet by mouth once daily 30 tablet 2    ibuprofen (ADVIL;MOTRIN) 200 MG tablet Take 1 tablet by mouth every 6 hours as needed for Pain #1-2 PO QD PRN      sildenafil (VIAGRA) 100 MG tablet take 1 tablet by mouth if needed for ERECTILE DYSFUNCTION 30 tablet 0    fexofenadine (ALLEGRA) 180 MG tablet Take 1 tablet by mouth      bacitracin 500 UNIT/GM ointment apply topically to affected area twice a day if needed for WOUND CARE for up to 10 days (Patient not taking: Reported on 12/19/2024)      colchicine (COLCRYS) 0.6 MG tablet take 1 tablet by mouth once daily (Patient not taking: Reported on 12/19/2024) 30 tablet 3    UREA 10 HYDRATING

## 2024-12-31 ENCOUNTER — CLINICAL DOCUMENTATION (OUTPATIENT)
Dept: PHYSICAL MEDICINE AND REHAB | Age: 69
End: 2024-12-31

## 2024-12-31 NOTE — PROGRESS NOTES
Called [facility] to request that from now going forward to bill the gabapentin to work. Comp.  Claim and gave pharmacy the information needed to bill.

## 2025-01-08 ENCOUNTER — PREP FOR PROCEDURE (OUTPATIENT)
Dept: GASTROENTEROLOGY | Age: 70
End: 2025-01-08

## 2025-01-08 DIAGNOSIS — Z12.11 COLON CANCER SCREENING: ICD-10-CM

## 2025-01-08 RX ORDER — POLYETHYLENE GLYCOL 3350, SODIUM SULFATE ANHYDROUS, SODIUM BICARBONATE, SODIUM CHLORIDE, POTASSIUM CHLORIDE 236; 22.74; 6.74; 5.86; 2.97 G/4L; G/4L; G/4L; G/4L; G/4L
POWDER, FOR SOLUTION ORAL
Qty: 4000 ML | Refills: 0 | Status: SHIPPED | OUTPATIENT
Start: 2025-01-08

## 2025-01-08 NOTE — TELEPHONE ENCOUNTER
Procedure scheduled/Dr Leo  Procedure:colon  Dx: screening  Date:04/22/2025  Time:1030 am  Hospital:[facility] phone call:eliu  Bowel Prep instructions given:tom  In office/via phone: phone  Clearance needed:none

## 2025-01-14 ENCOUNTER — HOSPITAL ENCOUNTER (OUTPATIENT)
Dept: GENERAL RADIOLOGY | Age: 70
Discharge: HOME | End: 2025-01-16
Payer: MEDICARE

## 2025-01-14 ENCOUNTER — HOSPITAL ENCOUNTER (OUTPATIENT)
Age: 70
Discharge: HOME | End: 2025-01-16
Payer: MEDICARE

## 2025-01-14 DIAGNOSIS — M25.511 ACUTE PAIN OF RIGHT SHOULDER: ICD-10-CM

## 2025-01-14 PROCEDURE — 73030 X-RAY EXAM OF SHOULDER: CPT

## 2025-02-07 PROBLEM — Z12.11 COLON CANCER SCREENING: Status: RESOLVED | Noted: 2025-01-08 | Resolved: 2025-02-07

## 2025-04-08 ENCOUNTER — HOSPITAL ENCOUNTER (OUTPATIENT)
Dept: PREADMISSION TESTING | Age: 70
Discharge: HOME | End: 2025-04-12
Attending: STUDENT IN AN ORGANIZED HEALTH CARE EDUCATION/TRAINING PROGRAM

## 2025-04-08 VITALS — HEIGHT: 68 IN | BODY MASS INDEX: 28.79 KG/M2 | WEIGHT: 190 LBS

## 2025-04-08 RX ORDER — MELOXICAM 7.5 MG/1
TABLET ORAL
COMMUNITY

## 2025-04-08 NOTE — PROGRESS NOTES
Pre-op Instructions For Out-Patient Endoscopy Surgery    Medication Instructions:  Please stop herbs and any supplements now (includes vitamins and minerals).    For these medications:  Dulaglutide (Trulicity), Exenatide (Byetta and Bydureon, Liraglutide (Victoza), Lixisenatide (Adlyxin), Semaglutide (Ozempic and Rybelsus), Tirzepatide (Mounjaro, Zepbound)- Stop 1 week prior if taking weekly or 1 day prior if taking every 12 hours or daily.     Please contact your surgeon and prescribing physician for pre-op instructions for any blood thinners. Mobic    If you have inhalers/aerosol treatments at home, please use them the morning of your surgery and bring the inhalers with you to the hospital.    Please take the following medications the morning of your surgery with a sip of water:    Metoprolol    Surgery Instructions:  After midnight before surgery:  Do not eat or drink anything, including water, mints, gum, and hard candy.  You may brush your teeth without swallowing.  No smoking, chewing tobacco, or street drugs.  ** Please Follow Bowel Prep instructions if given by surgeon's office**    Please shower or bathe before surgery.       Please do not wear any cologne, lotion, powder, jewelry, piercings, perfume, makeup, nail polish, hair accessories, or hair spray on the day of surgery.  Wear loose comfortable clothing.    Leave your valuables at home but bring a payment source for any after-surgery prescriptions you plan to fill at [facility].  Bring a storage case for any glasses/contacts.    An adult who is responsible for you MUST drive you home and should be with you for the first 24 hours after surgery.     The Day of Surgery:  Arrive at [facility] Surgery Entrance at the time directed by your surgeon and check in at the desk. Procedure scheduled for 1030. Please arrive by 830.     If you have a living will or healthcare power of , please bring a copy.    You will be taken to

## 2025-04-18 NOTE — PRE-PROCEDURE INSTRUCTIONS
No answer, left message ?                             Unable to leave message ?    When were you told to arrive at hospital ?  0830    Do you have a  ?yes    Are you on any blood thinners ? no                    If yes when did you stop taking ?    Do you have your prep Rx filled and instruction ? yes     Nothing to eat the day before , only clear liquids.yes    Are you experiencing any covid symptoms ? no    Do you have any infections or rash we should be aware of ?no      Do you have the Hibiclens soap to use the night before and the morning of surgery ?n/a    Nothing to eat or drink after midnight, only a sip of water to take any medication instructed to take the night before.yes  Wear comfortable clothing, leave any valuables at home, remove any jewelry and body piercing . yes

## 2025-04-21 ENCOUNTER — ANESTHESIA EVENT (OUTPATIENT)
Dept: ENDOSCOPY | Age: 70
End: 2025-04-21
Payer: MEDICARE

## 2025-04-21 ASSESSMENT — ENCOUNTER SYMPTOMS
ABDOMINAL PAIN: 0
NAUSEA: 0
SHORTNESS OF BREATH: 0
SINUS PRESSURE: 0

## 2025-04-22 ENCOUNTER — ANESTHESIA (OUTPATIENT)
Dept: ENDOSCOPY | Age: 70
End: 2025-04-22
Payer: MEDICARE

## 2025-04-22 ENCOUNTER — HOSPITAL ENCOUNTER (OUTPATIENT)
Age: 70
Setting detail: OUTPATIENT SURGERY
Discharge: HOME | End: 2025-04-22
Attending: STUDENT IN AN ORGANIZED HEALTH CARE EDUCATION/TRAINING PROGRAM | Admitting: STUDENT IN AN ORGANIZED HEALTH CARE EDUCATION/TRAINING PROGRAM
Payer: MEDICARE

## 2025-04-22 VITALS
SYSTOLIC BLOOD PRESSURE: 139 MMHG | RESPIRATION RATE: 15 BRPM | TEMPERATURE: 97.3 F | HEART RATE: 54 BPM | OXYGEN SATURATION: 95 % | BODY MASS INDEX: 28.79 KG/M2 | WEIGHT: 190 LBS | DIASTOLIC BLOOD PRESSURE: 73 MMHG | HEIGHT: 68 IN

## 2025-04-22 DIAGNOSIS — Z12.11 COLON CANCER SCREENING: ICD-10-CM

## 2025-04-22 PROCEDURE — 3609010600 HC COLONOSCOPY POLYPECTOMY SNARE/COLD BIOPSY: Performed by: STUDENT IN AN ORGANIZED HEALTH CARE EDUCATION/TRAINING PROGRAM

## 2025-04-22 PROCEDURE — 3700000000 HC ANESTHESIA ATTENDED CARE: Performed by: STUDENT IN AN ORGANIZED HEALTH CARE EDUCATION/TRAINING PROGRAM

## 2025-04-22 PROCEDURE — C1889 IMPLANT/INSERT DEVICE, NOC: HCPCS | Performed by: STUDENT IN AN ORGANIZED HEALTH CARE EDUCATION/TRAINING PROGRAM

## 2025-04-22 PROCEDURE — 7100000010 HC PHASE II RECOVERY - FIRST 15 MIN: Performed by: STUDENT IN AN ORGANIZED HEALTH CARE EDUCATION/TRAINING PROGRAM

## 2025-04-22 PROCEDURE — 2709999900 HC NON-CHARGEABLE SUPPLY: Performed by: STUDENT IN AN ORGANIZED HEALTH CARE EDUCATION/TRAINING PROGRAM

## 2025-04-22 PROCEDURE — 6360000002 HC RX W HCPCS: Performed by: NURSE ANESTHETIST, CERTIFIED REGISTERED

## 2025-04-22 PROCEDURE — 2580000003 HC RX 258: Performed by: ANESTHESIOLOGY

## 2025-04-22 PROCEDURE — 88305 TISSUE EXAM BY PATHOLOGIST: CPT

## 2025-04-22 PROCEDURE — 7100000011 HC PHASE II RECOVERY - ADDTL 15 MIN: Performed by: STUDENT IN AN ORGANIZED HEALTH CARE EDUCATION/TRAINING PROGRAM

## 2025-04-22 PROCEDURE — 3700000001 HC ADD 15 MINUTES (ANESTHESIA): Performed by: STUDENT IN AN ORGANIZED HEALTH CARE EDUCATION/TRAINING PROGRAM

## 2025-04-22 DEVICE — WORKING LENGTH 235CM, WORKING CHANNEL 2.8MM
Type: IMPLANTABLE DEVICE | Site: SIGMOID COLON | Status: FUNCTIONAL
Brand: RESOLUTION 360 CLIP

## 2025-04-22 RX ORDER — SODIUM CHLORIDE 0.9 % (FLUSH) 0.9 %
5-40 SYRINGE (ML) INJECTION PRN
Status: DISCONTINUED | OUTPATIENT
Start: 2025-04-22 | End: 2025-04-22 | Stop reason: HOSPADM

## 2025-04-22 RX ORDER — SODIUM CHLORIDE 9 MG/ML
INJECTION, SOLUTION INTRAVENOUS PRN
Status: DISCONTINUED | OUTPATIENT
Start: 2025-04-22 | End: 2025-04-22 | Stop reason: HOSPADM

## 2025-04-22 RX ORDER — SODIUM CHLORIDE, SODIUM LACTATE, POTASSIUM CHLORIDE, CALCIUM CHLORIDE 600; 310; 30; 20 MG/100ML; MG/100ML; MG/100ML; MG/100ML
INJECTION, SOLUTION INTRAVENOUS CONTINUOUS
Status: DISCONTINUED | OUTPATIENT
Start: 2025-04-22 | End: 2025-04-22 | Stop reason: HOSPADM

## 2025-04-22 RX ORDER — SODIUM CHLORIDE 0.9 % (FLUSH) 0.9 %
5-40 SYRINGE (ML) INJECTION EVERY 12 HOURS SCHEDULED
Status: DISCONTINUED | OUTPATIENT
Start: 2025-04-22 | End: 2025-04-22 | Stop reason: HOSPADM

## 2025-04-22 RX ORDER — LIDOCAINE HYDROCHLORIDE 10 MG/ML
1 INJECTION, SOLUTION EPIDURAL; INFILTRATION; INTRACAUDAL; PERINEURAL
Status: DISCONTINUED | OUTPATIENT
Start: 2025-04-22 | End: 2025-04-22 | Stop reason: HOSPADM

## 2025-04-22 RX ORDER — LIDOCAINE HYDROCHLORIDE 20 MG/ML
INJECTION, SOLUTION EPIDURAL; INFILTRATION; INTRACAUDAL; PERINEURAL
Status: DISCONTINUED | OUTPATIENT
Start: 2025-04-22 | End: 2025-04-22 | Stop reason: SDUPTHER

## 2025-04-22 RX ADMIN — SODIUM CHLORIDE, SODIUM LACTATE, POTASSIUM CHLORIDE, AND CALCIUM CHLORIDE: .6; .31; .03; .02 INJECTION, SOLUTION INTRAVENOUS at 09:38

## 2025-04-22 RX ADMIN — LIDOCAINE HYDROCHLORIDE 80 MG: 20 INJECTION, SOLUTION EPIDURAL; INFILTRATION; INTRACAUDAL; PERINEURAL at 10:22

## 2025-04-22 ASSESSMENT — ENCOUNTER SYMPTOMS
SHORTNESS OF BREATH: 0
SORE THROAT: 0
COUGH: 0
APNEA: 1
BACK PAIN: 1
TROUBLE SWALLOWING: 0

## 2025-04-22 ASSESSMENT — PAIN - FUNCTIONAL ASSESSMENT
PAIN_FUNCTIONAL_ASSESSMENT: 0-10
PAIN_FUNCTIONAL_ASSESSMENT: NONE - DENIES PAIN

## 2025-04-22 NOTE — H&P
HISTORY and PHYSICAL  [facility]       NAME:  Oziel Valadez  MRN: [id number]   YOB: 1955   Date: 4/21/2025   Age: 69 y.o.  Gender: male       COMPLAINT AND PRESENT HISTORY:          Oziel Valadez is 69 y.o.,   female, here for Procedure(s):  COLORECTAL CANCER SCREENING, NOT HIGH RISK    Pt is being seen for :  Pre-Op Diagnosis Codes:      * Colon cancer screening     Pt had last Colonoscopy in 2018   *** abdominal pain,  ***  dysphagia, heartburn.  *** nausea, vomiting, diarrhea, constipation.  *** blood in stool, dark tarry stools.  *** changes in appetite and unintended weight loss.  *** family history of colon cancer.  *** hx of smoking.     Medical history reviewed. Htn, hld, DARYL on CPAP,   Denies chest pain/pressure, palpitations, SOB, recent URI, fever or chills.     Completed and followed prescribed prep.      Pt has  blood thinning medications: mobic     Pt has general anesthesia complication; airway closed off after intubation after cervical neck surgery, was remedied with a counteracting medication       RECENT LABS, IMAGING AND TESTING     Lab Results   Component Value Date    WBC 8.1 02/24/2019    RBC 4.45 02/24/2019    HGB 14.5 02/24/2019    HCT 42.7 02/24/2019    MCV 96.0 02/24/2019    MCH 32.6 02/24/2019    MCHC 34.0 02/24/2019    RDW 13.0 02/24/2019     02/24/2019    MPV 8.9 02/24/2019        Lab Results   Component Value Date     06/10/2024    K 4.8 06/10/2024     06/10/2024    CO2 24 06/10/2024    BUN 18 06/10/2024    CREATININE 0.9 06/10/2024    GLUCOSE 112 (H) 06/10/2024    CALCIUM 9.4 06/10/2024    BILITOT 0.5 06/10/2024    ALKPHOS 99 06/10/2024    AST 24 06/10/2024    ALT 28 06/10/2024         PAST MEDICAL HISTORY     Past Medical History:   Diagnosis Date    Arthritis     Chronic right shoulder pain     Chronic shoulder pain, left 2001    had surgery to repair    Difficult intubation     Environmental allergies     History of general

## 2025-04-22 NOTE — OP NOTE
including severe pain, fever, or bleeding seek immediate care.  Follow-up with PCP as scheduled.  Discussed with the family  Repeat colonoscopy in 3 years with extended prep    Electronically signed by Lazaro Leo MD  on 4/22/2025 at 11:05 AM   This note is created with the assistance of a speech recognition program.  While intending to generate a document that actually reflects the content of the procedure, the document can still have some errors including those of syntax and sound a like substitutions which may escape proofreading.  It such instances, actual meaning can be extrapolated by contextual diversion.

## 2025-04-22 NOTE — DISCHARGE INSTRUCTIONS
Pt received A/Ox4. Mildly forgetful. Repeating questions already asked and forgetting information already provided. Received from cath lab. Lt groin accessed. Dressing remain dry and intact throughout shift. Weak pulses throughout. Dressing changed with hospitalist. Per hospitialist poor prognosis on foot. When talking about amputation, pt became very emotional. Stated that Maryanne Myers would rather die than to get her foot amputated\". Hospitalist will f/u with son to discuss [address]. Pt updated and agreeable to plan of care. Report endorsed to incoming RN.   All in moderation   Fats and oils do not provide fiber   Snacks, Sweets, and Condiments   Fruit Nuts Popcorn, whole-wheat pretzels, or trail mix made with dried fruits, nuts, and seeds Cakes, breads, and cookies made with oatmeal or whole-wheat flour   Most snack foods do not provide much fiber. Choose snacks with at least 2 grams of fiber per serving.     Last Reviewed: March 2011 Juli Maher MS, MPH, RD   Updated: 3/29/2011

## 2025-04-22 NOTE — H&P
HISTORY and PHYSICAL  [facility]       NAME:  Oziel Valadez  MRN: [id number]   YOB: 1955   Date: 4/22/2025   Age: 69 y.o.  Gender: male       COMPLAINT AND PRESENT HISTORY:     Oziel Valadez is 69 y.o.,  male, presents for COLORECTAL CANCER SCREENING, NOT HIGH RISK   Primary dx: Colon cancer screening [Z12.11].    HPI  Oziel Valadez is 69 y.o.,   male, having a Screening Colonoscopy. Previous Colonoscopy 2018 with biopsy.      Patient has hx of Colon Polyps.  Pt also has hx of Diverticulosis.       Patient denies any FH of Colon Cancer     Patient reports no changes in bowel habits. No GI /Rectal bleeding, experiencing red/ black/ BRBPR stools.      Patient denies any other GI symptoms. No nausea / vomiting.  No diarrhea or constipation. No abdominal pains or cramping.  No heartburn or dysphagia.  no changes in the color, caliber or   consistency of the stools. No fever or chills. Denies recent chest pain or SOB    Prep fully completed: yes. Pt reports his last BM is clear liquid     Review of additional significant medical hx: DARYL not using CPAP  (See chart for additional detail, including current medications /see ROS for current S/S):     NPO status: NPO since MN  Anticoagulation status: Meloxicam  Last dose 4/17/25    Denies personal hx of blood clots.  Denies personal hx of MRSA infection.  Difficulty with intubation/extubatio with previous w/anesthesia.  Nicotine status:Denies  PAST MEDICAL HISTORY     Past Medical History:   Diagnosis Date    Arthritis     Chronic right shoulder pain     Chronic shoulder pain, left 2001    had surgery to repair    Difficult intubation     Environmental allergies     History of general anesthesia complication 05/16/2001    airway closed off after intubation after cervical neck surgery, was remedied with a counteracting medication    HLD (hyperlipidemia)     Hypertension     DARYL on CPAP 04/22/2015    Dr. Nance. Auto, 12-16cm of H20

## 2025-04-22 NOTE — ANESTHESIA PRE PROCEDURE
Department of Anesthesiology  Preprocedure Note       Name:  Oziel Valadez   Age:  69 y.o.  :  1955                                          MRN:  [id number]         Date:  2025      Surgeon: Surgeon(s):  Lazaro Leo MD    Procedure: Procedure(s):  COLORECTAL CANCER SCREENING, NOT HIGH RISK    Medications prior to admission:   Prior to Admission medications    Medication Sig Start Date End Date Taking? Authorizing Provider   meloxicam (MOBIC) 7.5 MG tablet Take by mouth    Honorio Mitchell MD   polyethylene glycol (GOLYTELY) 236 g solution Please follow instructions as given to you by your provider 25   Lazaro Leo MD   gabapentin (NEURONTIN) 300 MG capsule Take 3 capsules by mouth 3 times daily for 180 days. 12/10/24 6/8/25  Lauren Macias MD   allopurinol (ZYLOPRIM) 100 MG tablet Take 1 tablet by mouth daily 24   Honorio Mitchell MD   atorvastatin (LIPITOR) 20 MG tablet  22   Honorio Mitchell MD   metoprolol succinate (TOPROL XL) 25 MG extended release tablet take 1 tablet by mouth once daily 8/15/22   Edmundo Domínguez MD   sildenafil (VIAGRA) 100 MG tablet take 1 tablet by mouth if needed for ERECTILE DYSFUNCTION 21   Edmundo Domínguez MD   fexofenadine (ALLEGRA) 180 MG tablet Take 1 tablet by mouth    Honorio Mitchell MD   fluticasone (FLONASE) 50 MCG/ACT nasal spray 1 spray by Nasal route daily 18  Rene Todd PA-C       Current medications:    No current facility-administered medications for this encounter.     Facility-Administered Medications Ordered in Other Encounters   Medication Dose Route Frequency Provider Last Rate Last Admin   • iothalamate (CONRAY) 43 % injection 15 mL  15 mL IntraVENous ONCE PRN Marcel Ware MD           Allergies:    Allergies   Allergen Reactions   • Cat Dander    • Dog Epithelium    • Other Rash     MD thinks he is allergic to something in the environment.       Problem List:

## 2025-04-22 NOTE — ANESTHESIA POSTPROCEDURE EVALUATION
Department of Anesthesiology  Postprocedure Note    Patient: Oziel Valadez  MRN: [id number]  YOB: 1955  Date of evaluation: 4/22/2025    Procedure Summary       Date: 04/22/25 Room / Location: [address] [address] / [facility]    Anesthesia Start: 1018 Anesthesia Stop: 1113    Procedure: COLONOSCOPY POLYPECTOMY SNARE/BIOPSY Diagnosis:       Colon cancer screening      (Colon cancer screening [Z12.11])    Surgeons: Lazaro Leo MD Responsible Provider: Sylvester Wall MD    Anesthesia Type: TIVA ASA Status: 2            Anesthesia Type: TIVA    Ar Phase I: Ar Score: 10    Ar Phase II: Ar Score: 10    Anesthesia Post Evaluation    Comments: POST- ANESTHESIA EVALUATION       Pt Name: Oziel Valadez  MRN: [id number]  YOB: 1955  Date of evaluation: 4/22/2025  Time:  2:14 PM      /73   Pulse 54   Temp 97.3 °F (36.3 °C)   Resp 15   Ht 1.727 m (5' 8\")   Wt 86.2 kg (190 lb)   SpO2 95%   BMI 28.89 kg/m²      Consciousness Level  Awake  Cardiopulmonary Status  Stable  Pain Adequately Treated YES  Nausea / Vomiting  NO  Adequate Hydration  YES  Anesthesia Related Complications NONE      Electronically signed by Sylvester Wall MD on 4/22/2025 at 2:14 PM           No notable events documented.

## 2025-04-24 LAB — SURGICAL PATHOLOGY REPORT: NORMAL

## 2025-06-09 DIAGNOSIS — Z89.511 S/P BKA (BELOW KNEE AMPUTATION) UNILATERAL, RIGHT (HCC): ICD-10-CM

## 2025-06-09 DIAGNOSIS — G54.6 PAIN, PHANTOM LIMB (HCC): ICD-10-CM

## 2025-06-09 RX ORDER — GABAPENTIN 300 MG/1
900 CAPSULE ORAL 3 TIMES DAILY
Qty: 810 CAPSULE | Refills: 1 | Status: SHIPPED | OUTPATIENT
Start: 2025-06-09 | End: 2025-06-10

## 2025-06-09 NOTE — TELEPHONE ENCOUNTER
Patient is requesting a 90 Day Supply of Gabapentin to be billed to [facility]. He is in [place] currently and would like it sent to the pharmacy listed.   Last visit 12/19/24  Next 6/23/25 confirmed with patient that he will be able to make it to that appointment.

## 2025-06-10 RX ORDER — GABAPENTIN 300 MG/1
900 CAPSULE ORAL 3 TIMES DAILY
Qty: 810 CAPSULE | Refills: 1 | Status: SHIPPED | OUTPATIENT
Start: 2025-06-10 | End: 2025-06-11

## 2025-06-11 RX ORDER — GABAPENTIN 300 MG/1
900 CAPSULE ORAL 3 TIMES DAILY
Qty: 810 CAPSULE | Refills: 1 | Status: SHIPPED | OUTPATIENT
Start: 2025-06-11 | End: 2025-12-08

## 2025-06-11 RX ORDER — GABAPENTIN 300 MG/1
900 CAPSULE ORAL 3 TIMES DAILY
Qty: 810 CAPSULE | Refills: 1 | Status: CANCELLED | OUTPATIENT
Start: 2025-06-11 | End: 2025-12-08

## 2025-06-11 NOTE — TELEPHONE ENCOUNTER
This is the Eprescribing error that is popping up for this medication in our in baskets:  Angel[facility] Ryanripts Out  P [facility] Clinical Staff  An error occurred while processing the e-prescribing message.    The message was not sent electronically to the requested pharmacy. Contact the pharmacy about the new prescription.    Code: 900 - Transaction rejected  Controlled substance must have a DEASchedule populated in Medication Prescribed

## 2025-06-11 NOTE — TELEPHONE ENCOUNTER
[facility] sending to [place] at this time.    [facility] in [place] cannot accept anymore controlled substance rxs from any providers.    Pt asking to have refill sent to [facility] [address], his previous pharmacy.  His fiance will overnight it to him.    Order has been re-entered

## 2025-06-23 ENCOUNTER — OFFICE VISIT (OUTPATIENT)
Dept: PHYSICAL MEDICINE AND REHAB | Age: 70
End: 2025-06-23

## 2025-06-23 VITALS
BODY MASS INDEX: 30.92 KG/M2 | HEIGHT: 68 IN | DIASTOLIC BLOOD PRESSURE: 82 MMHG | WEIGHT: 204 LBS | HEART RATE: 76 BPM | SYSTOLIC BLOOD PRESSURE: 122 MMHG

## 2025-06-23 DIAGNOSIS — D36.13 NEUROMA OF RIGHT LOWER EXTREMITY: ICD-10-CM

## 2025-06-23 DIAGNOSIS — S88.111A TRAUMATIC BELOW-KNEE AMPUTATION OF RIGHT LOWER EXTREMITY WITH COMPLICATION, INITIAL ENCOUNTER (HCC): ICD-10-CM

## 2025-06-23 DIAGNOSIS — Z89.511 S/P BKA (BELOW KNEE AMPUTATION) UNILATERAL, RIGHT (HCC): Primary | ICD-10-CM

## (undated) DEVICE — SUTURE VCRL + SZ 2-0 L27IN ABSRB CLR CT-1 1/2 CIR TAPERCUT VCP259H

## (undated) DEVICE — SUTURE PERMAHAND SZ 2-0 L12X18IN NONABSORBABLE BLK SILK A185H

## (undated) DEVICE — 3M™ STERI-STRIP™ COMPOUND BENZOIN TINCTURE 40 BAGS/CARTON 4 CARTONS/CASE C1544: Brand: 3M™ STERI-STRIP™

## (undated) DEVICE — GLOVE SURG SZ 8 CRM LTX FREE POLYISOPRENE POLYMER BEAD ANTI

## (undated) DEVICE — JELLY,LUBE,STERILE,FLIP TOP,TUBE,2-OZ: Brand: MEDLINE

## (undated) DEVICE — DEFENDO AIR WATER SUCTION AND BIOPSY VALVE KIT FOR  OLYMPUS: Brand: DEFENDO AIR/WATER/SUCTION AND BIOPSY VALVE

## (undated) DEVICE — CANNULA NSL AD TBNG L7FT PVC STR NONFLARED PRNG O2 DEL W STD

## (undated) DEVICE — GLOVE SURG SZ 65 L12IN FNGR THK79MIL GRN LTX FREE

## (undated) DEVICE — AGENT HEMSTAT W2XL14IN OXIDIZED REGENERATED CELOS ABSRB FOR

## (undated) DEVICE — ESMARK: Brand: DEROYAL

## (undated) DEVICE — Z INACTIVE USE 2525529 CONNECTOR TBNG FOR O2

## (undated) DEVICE — SUTURE MCRYL + SZ 4 0 L18IN ABSRB UD PC 3 L16MM 3 8 CIR PRIM MCP845G

## (undated) DEVICE — ENDO KIT W/SYRINGE: Brand: MEDLINE INDUSTRIES, INC.

## (undated) DEVICE — SUTURE PERMAHAND SZ 2-0 L18IN NONABSORBABLE BLK L26MM SH C012D

## (undated) DEVICE — GOWN,SIRUS,NONRNF,SETINSLV,XL,20/CS: Brand: MEDLINE

## (undated) DEVICE — SVMMC AMPUTATION PK

## (undated) DEVICE — FORCEPS BX L240CM JAW DIA2.8MM L CAP W/ NDL MIC MESH TOOTH

## (undated) DEVICE — Z DISCONTINUED USE 2424143 ADAPTER O2 SWVL CHRISTMAS TREE GRN

## (undated) DEVICE — MARKER,SKIN,WI/RULER AND LABELS: Brand: MEDLINE

## (undated) DEVICE — COVER,LIGHT HANDLE,FLX,2/PK: Brand: MEDLINE INDUSTRIES, INC.

## (undated) DEVICE — POLYP TRAP: Brand: TRAPEASE®

## (undated) DEVICE — APPLICATOR MEDICATED 26 CC SOLUTION HI LT ORNG CHLORAPREP

## (undated) DEVICE — SUTURE VCRL + SZ 0 L27IN ABSRB UD CT-1 L36MM 1/2 CIR TAPR VCP260H

## (undated) DEVICE — GLOVE SURG SZ 75 CRM LTX FREE POLYISOPRENE POLYMER BEAD ANTI

## (undated) DEVICE — GLOVE ORANGE PI 8 1/2   MSG9085

## (undated) DEVICE — SUTURE VCRL + SZ 3-0 L27IN ABSRB UD L26MM SH 1/2 CIR VCP416H

## (undated) DEVICE — STRIP,CLOSURE,WOUND,MEDI-STRIP,1/2X4: Brand: MEDLINE

## (undated) DEVICE — SNARE ENDOSCP L240CM OD24MM LOOP W15MM RND INSUL STIFF BRAID

## (undated) DEVICE — GLOVE SURG SZ 65 CRM LTX FREE POLYISOPRENE POLYMER BEAD ANTI

## (undated) DEVICE — BLADE ES ELASTOMERIC COAT INSUL DURABLE BEND UPTO 90DEG

## (undated) DEVICE — Z DUPLICATE USE 2527422 TUBING O2 STD 7 FT EXTN NO CRUSH VISUAL CNTRST LF

## (undated) DEVICE — ERBE NESSY® OMEGA PLATE USA (85+23)CM² , WITH CABLE 3 M: Brand: ERBE

## (undated) DEVICE — GOWN,AURORA,NONREINFORCED,LARGE: Brand: MEDLINE

## (undated) DEVICE — GLOVE ORANGE PI 7   MSG9070

## (undated) DEVICE — GOWN,POLY REINFORCED,LG: Brand: MEDLINE

## (undated) DEVICE — SUTURE PERMAHAND SZ 3-0 L18IN NONABSORBABLE BLK SILK BRAID A184H